# Patient Record
Sex: FEMALE | Race: OTHER | HISPANIC OR LATINO | URBAN - METROPOLITAN AREA
[De-identification: names, ages, dates, MRNs, and addresses within clinical notes are randomized per-mention and may not be internally consistent; named-entity substitution may affect disease eponyms.]

---

## 2019-09-21 ENCOUNTER — EMERGENCY (EMERGENCY)
Facility: HOSPITAL | Age: 84
LOS: 1 days | Discharge: HOME | End: 2019-09-21
Attending: EMERGENCY MEDICINE | Admitting: EMERGENCY MEDICINE
Payer: MEDICARE

## 2019-09-21 VITALS
RESPIRATION RATE: 18 BRPM | SYSTOLIC BLOOD PRESSURE: 140 MMHG | TEMPERATURE: 98 F | OXYGEN SATURATION: 95 % | HEART RATE: 90 BPM | DIASTOLIC BLOOD PRESSURE: 92 MMHG

## 2019-09-21 LAB
ANION GAP SERPL CALC-SCNC: 12 MMOL/L — SIGNIFICANT CHANGE UP (ref 5–17)
APPEARANCE UR: CLEAR — SIGNIFICANT CHANGE UP
APTT BLD: 31.8 SEC — SIGNIFICANT CHANGE UP (ref 27.5–36.3)
BASOPHILS # BLD AUTO: 0.05 K/UL — SIGNIFICANT CHANGE UP (ref 0–0.2)
BASOPHILS NFR BLD AUTO: 0.8 % — SIGNIFICANT CHANGE UP (ref 0–2)
BILIRUB UR-MCNC: NEGATIVE — SIGNIFICANT CHANGE UP
BUN SERPL-MCNC: 18 MG/DL — SIGNIFICANT CHANGE UP (ref 7–23)
CALCIUM SERPL-MCNC: 8.9 MG/DL — SIGNIFICANT CHANGE UP (ref 8.4–10.5)
CHLORIDE SERPL-SCNC: 106 MMOL/L — SIGNIFICANT CHANGE UP (ref 96–108)
CK MB CFR SERPL CALC: 4.6 NG/ML — SIGNIFICANT CHANGE UP (ref 0–6.7)
CO2 SERPL-SCNC: 22 MMOL/L — SIGNIFICANT CHANGE UP (ref 22–31)
COLOR SPEC: YELLOW — SIGNIFICANT CHANGE UP
CREAT SERPL-MCNC: 0.93 MG/DL — SIGNIFICANT CHANGE UP (ref 0.5–1.3)
D DIMER BLD IA.RAPID-MCNC: 414 NG/ML DDU — HIGH
DIFF PNL FLD: NEGATIVE — SIGNIFICANT CHANGE UP
EOSINOPHIL # BLD AUTO: 0.71 K/UL — HIGH (ref 0–0.5)
EOSINOPHIL NFR BLD AUTO: 10.7 % — HIGH (ref 0–6)
GLUCOSE SERPL-MCNC: 128 MG/DL — HIGH (ref 70–99)
GLUCOSE UR QL: NEGATIVE — SIGNIFICANT CHANGE UP
HCT VFR BLD CALC: 42.9 % — SIGNIFICANT CHANGE UP (ref 34.5–45)
HGB BLD-MCNC: 13.8 G/DL — SIGNIFICANT CHANGE UP (ref 11.5–15.5)
IMM GRANULOCYTES NFR BLD AUTO: 0.5 % — SIGNIFICANT CHANGE UP (ref 0–1.5)
INR BLD: 1.12 — SIGNIFICANT CHANGE UP (ref 0.88–1.16)
KETONES UR-MCNC: NEGATIVE — SIGNIFICANT CHANGE UP
LEUKOCYTE ESTERASE UR-ACNC: ABNORMAL
LIDOCAIN IGE QN: 8 U/L — SIGNIFICANT CHANGE UP (ref 7–60)
LYMPHOCYTES # BLD AUTO: 1.89 K/UL — SIGNIFICANT CHANGE UP (ref 1–3.3)
LYMPHOCYTES # BLD AUTO: 28.6 % — SIGNIFICANT CHANGE UP (ref 13–44)
MAGNESIUM SERPL-MCNC: 2.2 MG/DL — SIGNIFICANT CHANGE UP (ref 1.6–2.6)
MCHC RBC-ENTMCNC: 29.9 PG — SIGNIFICANT CHANGE UP (ref 27–34)
MCHC RBC-ENTMCNC: 32.2 GM/DL — SIGNIFICANT CHANGE UP (ref 32–36)
MCV RBC AUTO: 93.1 FL — SIGNIFICANT CHANGE UP (ref 80–100)
MONOCYTES # BLD AUTO: 0.69 K/UL — SIGNIFICANT CHANGE UP (ref 0–0.9)
MONOCYTES NFR BLD AUTO: 10.4 % — SIGNIFICANT CHANGE UP (ref 2–14)
NEUTROPHILS # BLD AUTO: 3.24 K/UL — SIGNIFICANT CHANGE UP (ref 1.8–7.4)
NEUTROPHILS NFR BLD AUTO: 49 % — SIGNIFICANT CHANGE UP (ref 43–77)
NITRITE UR-MCNC: POSITIVE
NRBC # BLD: 0 /100 WBCS — SIGNIFICANT CHANGE UP (ref 0–0)
PH UR: 7 — SIGNIFICANT CHANGE UP (ref 5–8)
PLATELET # BLD AUTO: 233 K/UL — SIGNIFICANT CHANGE UP (ref 150–400)
POTASSIUM SERPL-MCNC: 3.6 MMOL/L — SIGNIFICANT CHANGE UP (ref 3.5–5.3)
POTASSIUM SERPL-SCNC: 3.6 MMOL/L — SIGNIFICANT CHANGE UP (ref 3.5–5.3)
PROT UR-MCNC: NEGATIVE MG/DL — SIGNIFICANT CHANGE UP
PROTHROM AB SERPL-ACNC: 12.7 SEC — SIGNIFICANT CHANGE UP (ref 10–12.9)
RBC # BLD: 4.61 M/UL — SIGNIFICANT CHANGE UP (ref 3.8–5.2)
RBC # FLD: 13.2 % — SIGNIFICANT CHANGE UP (ref 10.3–14.5)
SODIUM SERPL-SCNC: 140 MMOL/L — SIGNIFICANT CHANGE UP (ref 135–145)
SP GR SPEC: 1.01 — SIGNIFICANT CHANGE UP (ref 1–1.03)
TROPONIN T SERPL-MCNC: 0.02 NG/ML — HIGH (ref 0–0.01)
TSH SERPL-MCNC: 5.19 UIU/ML — HIGH (ref 0.35–4.94)
UROBILINOGEN FLD QL: 0.2 E.U./DL — SIGNIFICANT CHANGE UP
WBC # BLD: 6.61 K/UL — SIGNIFICANT CHANGE UP (ref 3.8–10.5)
WBC # FLD AUTO: 6.61 K/UL — SIGNIFICANT CHANGE UP (ref 3.8–10.5)

## 2019-09-21 PROCEDURE — 71275 CT ANGIOGRAPHY CHEST: CPT

## 2019-09-21 PROCEDURE — 36415 COLL VENOUS BLD VENIPUNCTURE: CPT

## 2019-09-21 PROCEDURE — 85025 COMPLETE CBC W/AUTO DIFF WBC: CPT

## 2019-09-21 PROCEDURE — 99284 EMERGENCY DEPT VISIT MOD MDM: CPT | Mod: 25

## 2019-09-21 PROCEDURE — 81001 URINALYSIS AUTO W/SCOPE: CPT

## 2019-09-21 PROCEDURE — 83880 ASSAY OF NATRIURETIC PEPTIDE: CPT

## 2019-09-21 PROCEDURE — 71045 X-RAY EXAM CHEST 1 VIEW: CPT

## 2019-09-21 PROCEDURE — 80162 ASSAY OF DIGOXIN TOTAL: CPT

## 2019-09-21 PROCEDURE — 99284 EMERGENCY DEPT VISIT MOD MDM: CPT

## 2019-09-21 PROCEDURE — 82550 ASSAY OF CK (CPK): CPT

## 2019-09-21 PROCEDURE — 85730 THROMBOPLASTIN TIME PARTIAL: CPT

## 2019-09-21 PROCEDURE — 87186 SC STD MICRODIL/AGAR DIL: CPT

## 2019-09-21 PROCEDURE — 84484 ASSAY OF TROPONIN QUANT: CPT

## 2019-09-21 PROCEDURE — 80048 BASIC METABOLIC PNL TOTAL CA: CPT

## 2019-09-21 PROCEDURE — 84443 ASSAY THYROID STIM HORMONE: CPT

## 2019-09-21 PROCEDURE — 71275 CT ANGIOGRAPHY CHEST: CPT | Mod: 26

## 2019-09-21 PROCEDURE — 87086 URINE CULTURE/COLONY COUNT: CPT

## 2019-09-21 PROCEDURE — 85610 PROTHROMBIN TIME: CPT

## 2019-09-21 PROCEDURE — 71045 X-RAY EXAM CHEST 1 VIEW: CPT | Mod: 26

## 2019-09-21 PROCEDURE — 85379 FIBRIN DEGRADATION QUANT: CPT

## 2019-09-21 PROCEDURE — 82553 CREATINE MB FRACTION: CPT

## 2019-09-21 PROCEDURE — 93005 ELECTROCARDIOGRAM TRACING: CPT

## 2019-09-21 PROCEDURE — 83690 ASSAY OF LIPASE: CPT

## 2019-09-21 PROCEDURE — 83735 ASSAY OF MAGNESIUM: CPT

## 2019-09-21 NOTE — ED PROVIDER NOTE - OBJECTIVE STATEMENT
phx/Rx obtained from niece subsequent to event. remaining obtained from ems/pt.    86F mild dementia, "leaky valves" on digoxin (no lasix), htn, hld, [facility] where [facility] c/o acute sob/generalized weakness. upon arrival ems reports +wheezing and so gave duoneb w/ improved sx but then incidentally found to have hr transiently 200s before dropping to 110-120s on rhythm strip/12 lead, sbp 120s, 99% RA on nrb for comfort. initally called in as "stemi." upon arrival to ED, pt denies any sx. no fever/chills, no uri/cough, no cp, no palpitations, no ha/dizziness, no abd pain/n/v, no diarrhea, no leg pain/swelling/rash, no phx dvt/pe, no phx malignancy, no recent travel. at baseline, a+ox3, most ADLs, ambulates w/ walker.    per niece, Rx: lyrica, crestor, enalapril, digoxin, corlandor, ivabradire, carvedilol, ventolin    pcp: carole oro

## 2019-09-21 NOTE — ED PROVIDER NOTE - NSFOLLOWUPINSTRUCTIONS_ED_ALL_ED_FT
please follow up with your cardiologist    please come back to the er for any worsening of symptoms    Taquicardia supraventricular en los adultos  Supraventricular Tachycardia, Adult  Introducción  La taquicardia supraventricular (TSV) es yamileth alteración en los latidos cardíacos. Hace que el corazón deejay muy rápido y que luego deejay con yamileth frecuencia normal.    Yamileth frecuencia cardíaca normal es entre 60 y 100 latidos por minuto. Esta afección puede hacer que el corazón deejay con yamileth frecuencia mayor a 150 veces por minuto. Los episodios de latidos cardíacos acelerados pueden ser aterradores, priyank generalmente no son peligrosos. Pueden provocar complicaciones si:  Ocurren frecuentemente.  Martinez mucho tiempo.  Los síntomas de esta afección incluyen los siguientes:  Palpitaciones.  Sensación de que el corazón se saltea latidos (palpitaciones).  Debilidad.  Problema para recibir suficiente aire (falta de aire).  Dolor u opresión en el pecho.  Se siente jasbir si se fuera a desmayar (sensación de desvanecimiento).  Estar preocupado o nervioso (ansioso).  Mareos.  Sudoración.  Ganas de vomitar (náuseas).  Desmayos.  Cansancio.  En algunos casos, no hay síntomas.    Siga estas indicaciones en grace casa:  Estrés     Image   Evite las cosas que lo hacen sentir estresado.  [place] qué lo ayuda a sentirse menos estresado. Intente lo siguiente:  Realizar yamileth actividad relajante, jasbir yoga o la meditación, o estar en contacto con la naturaleza.  Escuchar música relajante.  Practicar técnicas de relajación, jasbir la respiración profunda.  [place] medidas para mantenerse saludable. [place] implica dormir mucho, hacer ejercicio y seguir yamileth dieta equilibrada.  Consultar a un médico de alfonso mental.  Cadiz     Trate de dormir jasbir mínimo 7 horas todas las noches.  Tabaco y nicotina     Image   No consuma nada que contenga nicotina o tabaco, jasbir cigarrillos y cigarrillos electrónicos. Si necesita ayuda para dejar de fumar, consulte al médico.  Alcohol     Si el alcohol le provoca latidos cardíacos acelerados, no consuma alcohol.  Si el alcohol no parece afectar emily latidos cardíacos, limite grace consumo. En el adama de las mujeres que no están embarazadas, esto significa no beber más de 1 medida por día. En el adama de los hombres, esto significa no beber más de 2 medidas por día. "Yamileth medida" significa:  12 onzas (355 ml) de cerveza.  5 onzas de vino (150 ml) de vino.  1½ onzas (45 ml) de bebidas alcohólicas de millicent graduación.  Cafeína     Si la cafeína le provoca latidos cardíacos acelerados, no coma, marta ni consuma nada que contenga cafeína.  Si la cafeína no parece afectar emily latidos cardíacos, limite la cantidad de cafeína que come, joya o consume.  Estimulantes     No consuma estimulantes. Estas son drogas jasbir la cocaína o la metanfetamina. Si necesita ayuda para dejar de drogarse, consulte al médico.  Instrucciones generales     Mantenga un peso saludable.  [place] ejercicios regularmente. Pídale al médico que le sugiera algunas actividades adecuadas para usted. Pruebe yamileth de las siguientes opciones:  150 minutos semanales de ejercicio leve, jasbir caminar o practicar yoga.  75 minutos semanales de ejercicio de millicent intensidad, jasbir correr o nadar.  Yamileth combinación de ejercicios leves y de millicent intensidad.  [place] tratamientos en el hogar para desacelerar los latidos cardíacos jasbir se lo haya indicado el médico.  [place] los medicamentos de venta alpesh y los recetados solamente jasbir se lo haya indicado el médico.  Comuníquese con un médico si:  Tiene latidos cardíacos acelerados con más frecuencia.  Los episodios de latidos cardíacos acelerados martinez más que antes.  Los tratamientos que hace en el hogar para desacelerar los latidos cardíacos no son útiles.  Aparecen nuevos síntomas.  Solicite ayuda de inmediato si:  Siente dolor en el pecho.  Los síntomas empeoran.  Tiene dificultad para respirar.  El corazón le late muy rápido enrike más de 20 minutos.  Pierde el conocimiento (se desmaya).  Estos síntomas pueden indicar yamileth emergencia. No espere hasta que los síntomas desaparezcan. Solicite atención médica de inmediato. Comuníquese con el servicio de emergencias de grace localidad (911 en los [place]). No conduzca por emily propios medios hasta el hospital.     Esta información no tiene jasbir fin reemplazar el consejo del médico. Asegúrese de hacerle al médico cualquier pregunta que tenga.    Document Released: 12/18/2006 Document Revised: 07/23/2018 Document Reviewed: 06/15/2017  Elsevier Interactive Patient Education © 2019 Elsevier Inc.

## 2019-09-21 NOTE — ED PROVIDER NOTE - CARE PLAN
Principal Discharge DX:	SVT (supraventricular tachycardia)  Secondary Diagnosis:	Shortness of breath

## 2019-09-21 NOTE — ED ADULT TRIAGE NOTE - OTHER COMPLAINTS
SOB today. Pt was placed on cardiac monitor and noted vtach. Pt denies active cp, lightheadedness, no active sob. Upgraded to resus. EKG in progress

## 2019-09-21 NOTE — ED ADULT NURSE REASSESSMENT NOTE - NS ED NURSE REASSESS COMMENT FT1
Rec'd pt calm, in no distress, breathing with ease on room air. Pt denies CP/discomfort. States she is feeling much better. Pt on Cardiac monitor, VSS. Pt needs met. Pt made aware of plan of care and in agreement. HL 18G RAC noted, patent. Awaiting further eval and dispo. Safety precautions in place. Close monitoring continues.

## 2019-09-21 NOTE — ED PROVIDER NOTE - PATIENT PORTAL LINK FT
You can access the FollowMyHealth Patient Portal offered by [facility] by registering at the following website: [email or web address]. By joining [facility]’s FollowMyHealth portal, you will also be able to view your health information using other applications (apps) compatible with our system.

## 2019-09-21 NOTE — ED PROVIDER NOTE - CLINICAL SUMMARY MEDICAL DECISION MAKING FREE TEXT BOX
dispo: pending cta chest, admission to [facility] under mati for acs r/o avss. nontoxic. NAD. no active cp. no acute resp distress. pt reports resolved sx. called in as a stemi code however found to be likely in svt w/ aberrancy that resolved spontaneously upon repeat ekg. remained hds w/ intact mentation. cardiology consulted. trop 0.02 w/o prior, bnp elevated, ddimer elevated. will get cta chest and admit for r/o acs. no leukocytosis vs sig anemia vs electrolyte abnl. cxr w/o acute focal consol vs ptx vs pulm edema.  dispo: pending cta chest, admission to [facility] under [facility] for acs r/o

## 2019-09-21 NOTE — ED PROVIDER NOTE - PROGRESS NOTE DETAILS
cards called upon arrival given 12 lead w/ c/f stable vtach hr 120s. upon arrival pt spontanouesly broke w/ LBBB likely svt w/ aberrancy. okay to admit to cards tele under mati. cards called upon arrival given 12 lead w/ c/f stable vtach hr 120s. upon arrival pt spontanouesly broke to hr 80s w/ LBBB likely svt w/ aberrancy. okay to admit to cards tele under mati. ct reviewed with pt and HCP. ct discussed with Dr. Cardona and patient and family. family and patient would like to be dc home AMA. discussed risks and concerns given concer for svt. pt states "I am old and it is in the hands of god". Patient requests to leave emergency department against medical advice, prior to completion of my desired evaluation and treatment plan.  Patient's mental status is normal, patient is fully alert and oriented to person, place and time.  Patient demonstrates clear reasoning capabilities and capacity to make this decision.   I have spoken to the patient regarding risks of leaving without further testing and/or treatment. Patient fully understands the explained risks involved with this decision including worsening of medical condition, missed and or delayed diagnosis, permanent disability and death.  All alternative options given to patient who still desires discharge against medical advice from ED and will follow up as an outpatient.  Patient is encouraged to return to ED at any time to have further evaluation and treatment should and/or if symptoms worsen. ED evaluation and management discussed with the patient and family (if available) in detail.  Close PMD follow up encouraged.  Strict ED return instructions discussed in detail and patient given the opportunity to ask any questions about their discharge diagnosis and instructions. Patient verbalized understanding. Patient is agreeable to plan.

## 2019-09-21 NOTE — ED ADULT NURSE NOTE - NSIMPLEMENTINTERV_GEN_ALL_ED
Implemented All Universal Safety Interventions:  [place] to call system. Call bell, personal items and telephone within reach. Instruct patient to call for assistance. Room bathroom lighting operational. Non-slip footwear when patient is off stretcher. Physically safe environment: no spills, clutter or unnecessary equipment. Stretcher in lowest position, wheels locked, appropriate side rails in place.

## 2019-09-21 NOTE — ED PROVIDER NOTE - PHYSICAL EXAMINATION
CONST: nontoxic NAD speaking in full sentences  HEAD: atraumatic  EYES: conjunctivae clear  ENT: mmm  NECK: supple, no jvd  CARD: rrr no murmurs  CHEST: ctab no r/r/w, no stridor/retractions/tripoding  ABD: soft, nd, nttp, no rebound/guarding  EXT: FROM, symmetric distal pulses intact  SKIN: warm, dry, no rash, no pedal edema/pain/rash, cap refill <2sec  NEURO: a+ox3, 5/5 strength x4, gross sensation intact x4, normal gait

## 2019-09-22 VITALS
SYSTOLIC BLOOD PRESSURE: 152 MMHG | HEART RATE: 72 BPM | DIASTOLIC BLOOD PRESSURE: 64 MMHG | RESPIRATION RATE: 18 BRPM | TEMPERATURE: 98 F | OXYGEN SATURATION: 96 %

## 2019-09-23 LAB
-  AMPICILLIN/SULBACTAM: SIGNIFICANT CHANGE UP
-  AMPICILLIN: SIGNIFICANT CHANGE UP
-  CEFAZOLIN: SIGNIFICANT CHANGE UP
-  CEFTRIAXONE: SIGNIFICANT CHANGE UP
-  CIPROFLOXACIN: SIGNIFICANT CHANGE UP
-  GENTAMICIN: SIGNIFICANT CHANGE UP
-  NITROFURANTOIN: SIGNIFICANT CHANGE UP
-  PIPERACILLIN/TAZOBACTAM: SIGNIFICANT CHANGE UP
-  TOBRAMYCIN: SIGNIFICANT CHANGE UP
-  TRIMETHOPRIM/SULFAMETHOXAZOLE: SIGNIFICANT CHANGE UP
CULTURE RESULTS: SIGNIFICANT CHANGE UP
METHOD TYPE: SIGNIFICANT CHANGE UP
ORGANISM # SPEC MICROSCOPIC CNT: SIGNIFICANT CHANGE UP
ORGANISM # SPEC MICROSCOPIC CNT: SIGNIFICANT CHANGE UP
SPECIMEN SOURCE: SIGNIFICANT CHANGE UP

## 2019-09-24 RX ORDER — CEPHALEXIN 500 MG
1 CAPSULE ORAL
Qty: 10 | Refills: 0
Start: 2019-09-24 | End: 2019-09-28

## 2019-09-25 DIAGNOSIS — R06.02 SHORTNESS OF BREATH: ICD-10-CM

## 2019-09-25 DIAGNOSIS — I47.1 SUPRAVENTRICULAR TACHYCARDIA: ICD-10-CM

## 2020-01-01 ENCOUNTER — RX RENEWAL (OUTPATIENT)
Age: 85
End: 2020-01-01

## 2020-01-01 ENCOUNTER — APPOINTMENT (OUTPATIENT)
Dept: HOME HEALTH SERVICES | Facility: HOME HEALTH | Age: 85
End: 2020-01-01

## 2020-01-01 ENCOUNTER — APPOINTMENT (OUTPATIENT)
Dept: HOME HEALTH SERVICES | Facility: HOME HEALTH | Age: 85
End: 2020-01-01
Payer: MEDICARE

## 2020-01-01 VITALS
HEART RATE: 76 BPM | DIASTOLIC BLOOD PRESSURE: 80 MMHG | SYSTOLIC BLOOD PRESSURE: 120 MMHG | RESPIRATION RATE: 18 BRPM | TEMPERATURE: 98 F | OXYGEN SATURATION: 96 % | HEIGHT: 60 IN

## 2020-01-01 VITALS
DIASTOLIC BLOOD PRESSURE: 70 MMHG | TEMPERATURE: 97 F | OXYGEN SATURATION: 94 % | RESPIRATION RATE: 18 BRPM | HEIGHT: 60 IN | HEART RATE: 72 BPM | SYSTOLIC BLOOD PRESSURE: 126 MMHG

## 2020-01-01 DIAGNOSIS — Z23 ENCOUNTER FOR IMMUNIZATION: ICD-10-CM

## 2020-01-01 DIAGNOSIS — R78.89 FINDING OF OTHER SPECIFIED SUBSTANCES, NOT NORMALLY FOUND IN BLOOD: ICD-10-CM

## 2020-01-01 LAB
ALBUMIN SERPL ELPH-MCNC: 3.9 G/DL
ALP BLD-CCNC: 105 U/L
ALT SERPL-CCNC: 15 U/L
ANION GAP SERPL CALC-SCNC: 14 MMOL/L
AST SERPL-CCNC: 28 U/L
BASOPHILS # BLD AUTO: 0.04 K/UL
BASOPHILS NFR BLD AUTO: 0.7 %
BILIRUB SERPL-MCNC: 0.5 MG/DL
BUN SERPL-MCNC: 36 MG/DL
CALCIUM SERPL-MCNC: 9.2 MG/DL
CHLORIDE SERPL-SCNC: 104 MMOL/L
CHOLEST SERPL-MCNC: 114 MG/DL
CHOLEST/HDLC SERPL: 2.4 RATIO
CO2 SERPL-SCNC: 23 MMOL/L
CREAT SERPL-MCNC: 1.17 MG/DL
DIGOXIN SERPL-MCNC: 0.6 NG/ML
EOSINOPHIL # BLD AUTO: 0.62 K/UL
EOSINOPHIL NFR BLD AUTO: 10.6 %
GLUCOSE SERPL-MCNC: 123 MG/DL
HCT VFR BLD CALC: 34.4 %
HDLC SERPL-MCNC: 47 MG/DL
HGB BLD-MCNC: 10.8 G/DL
IMM GRANULOCYTES NFR BLD AUTO: 0.2 %
LDLC SERPL CALC-MCNC: 52 MG/DL
LYMPHOCYTES # BLD AUTO: 1.33 K/UL
LYMPHOCYTES NFR BLD AUTO: 22.8 %
MAN DIFF?: NORMAL
MCHC RBC-ENTMCNC: 29.5 PG
MCHC RBC-ENTMCNC: 31.4 GM/DL
MCV RBC AUTO: 94 FL
MONOCYTES # BLD AUTO: 0.54 K/UL
MONOCYTES NFR BLD AUTO: 9.2 %
NEUTROPHILS # BLD AUTO: 3.3 K/UL
NEUTROPHILS NFR BLD AUTO: 56.5 %
PLATELET # BLD AUTO: 211 K/UL
POTASSIUM SERPL-SCNC: 3.8 MMOL/L
PROT SERPL-MCNC: 7 G/DL
RBC # BLD: 3.66 M/UL
RBC # FLD: 13.8 %
SODIUM SERPL-SCNC: 142 MMOL/L
TRIGL SERPL-MCNC: 76 MG/DL
WBC # FLD AUTO: 5.84 K/UL

## 2020-01-01 PROCEDURE — G0008: CPT

## 2020-01-01 PROCEDURE — 99349 HOME/RES VST EST MOD MDM 40: CPT | Mod: 25

## 2020-01-01 PROCEDURE — 90662 IIV NO PRSV INCREASED AG IM: CPT

## 2020-01-01 PROCEDURE — 99349 HOME/RES VST EST MOD MDM 40: CPT

## 2020-01-01 RX ORDER — FUROSEMIDE 40 MG/1
40 TABLET ORAL DAILY
Qty: 90 | Refills: 3 | Status: DISCONTINUED | COMMUNITY
Start: 2020-03-05 | End: 2020-01-01

## 2020-01-01 RX ORDER — ALBUTEROL SULFATE 90 UG/1
108 (90 BASE) AEROSOL, METERED RESPIRATORY (INHALATION) EVERY 4 HOURS
Qty: 1 | Refills: 5 | Status: DISCONTINUED | COMMUNITY
Start: 2020-01-01 | End: 2020-01-01

## 2020-01-13 ENCOUNTER — INPATIENT (INPATIENT)
Facility: HOSPITAL | Age: 85
LOS: 13 days | Discharge: NURSING, REHAB OR LONG-TERM CARE | DRG: 871 | End: 2020-01-27
Attending: INTERNAL MEDICINE | Admitting: INTERNAL MEDICINE
Payer: MEDICARE

## 2020-01-13 VITALS
HEART RATE: 90 BPM | RESPIRATION RATE: 22 BRPM | DIASTOLIC BLOOD PRESSURE: 90 MMHG | OXYGEN SATURATION: 98 % | WEIGHT: 149.91 LBS | SYSTOLIC BLOOD PRESSURE: 180 MMHG

## 2020-01-13 DIAGNOSIS — F03.90 UNSPECIFIED DEMENTIA WITHOUT BEHAVIORAL DISTURBANCE: ICD-10-CM

## 2020-01-13 DIAGNOSIS — E78.5 HYPERLIPIDEMIA, UNSPECIFIED: ICD-10-CM

## 2020-01-13 DIAGNOSIS — A41.9 SEPSIS, UNSPECIFIED ORGANISM: ICD-10-CM

## 2020-01-13 DIAGNOSIS — N17.9 ACUTE KIDNEY FAILURE, UNSPECIFIED: ICD-10-CM

## 2020-01-13 DIAGNOSIS — Z91.89 OTHER SPECIFIED PERSONAL RISK FACTORS, NOT ELSEWHERE CLASSIFIED: ICD-10-CM

## 2020-01-13 DIAGNOSIS — Z29.9 ENCOUNTER FOR PROPHYLACTIC MEASURES, UNSPECIFIED: ICD-10-CM

## 2020-01-13 DIAGNOSIS — I50.9 HEART FAILURE, UNSPECIFIED: ICD-10-CM

## 2020-01-13 DIAGNOSIS — I10 ESSENTIAL (PRIMARY) HYPERTENSION: ICD-10-CM

## 2020-01-13 DIAGNOSIS — R74.0 NONSPECIFIC ELEVATION OF LEVELS OF TRANSAMINASE AND LACTIC ACID DEHYDROGENASE [LDH]: ICD-10-CM

## 2020-01-13 DIAGNOSIS — R79.89 OTHER SPECIFIED ABNORMAL FINDINGS OF BLOOD CHEMISTRY: ICD-10-CM

## 2020-01-13 LAB
ALBUMIN SERPL ELPH-MCNC: 4.1 G/DL — SIGNIFICANT CHANGE UP (ref 3.3–5)
ALP SERPL-CCNC: 155 U/L — HIGH (ref 40–120)
ALT FLD-CCNC: 28 U/L — SIGNIFICANT CHANGE UP (ref 10–45)
ANION GAP SERPL CALC-SCNC: 16 MMOL/L — SIGNIFICANT CHANGE UP (ref 5–17)
APPEARANCE UR: ABNORMAL
APTT BLD: 37.7 SEC — HIGH (ref 27.5–36.3)
AST SERPL-CCNC: 116 U/L — HIGH (ref 10–40)
BASOPHILS # BLD AUTO: 0.04 K/UL — SIGNIFICANT CHANGE UP (ref 0–0.2)
BASOPHILS NFR BLD AUTO: 0.2 % — SIGNIFICANT CHANGE UP (ref 0–2)
BILIRUB SERPL-MCNC: 0.4 MG/DL — SIGNIFICANT CHANGE UP (ref 0.2–1.2)
BILIRUB UR-MCNC: NEGATIVE — SIGNIFICANT CHANGE UP
BUN SERPL-MCNC: 22 MG/DL — SIGNIFICANT CHANGE UP (ref 7–23)
CALCIUM SERPL-MCNC: 8.7 MG/DL — SIGNIFICANT CHANGE UP (ref 8.4–10.5)
CHLORIDE SERPL-SCNC: 101 MMOL/L — SIGNIFICANT CHANGE UP (ref 96–108)
CO2 SERPL-SCNC: 19 MMOL/L — LOW (ref 22–31)
COLOR SPEC: YELLOW — SIGNIFICANT CHANGE UP
CREAT SERPL-MCNC: 1.62 MG/DL — HIGH (ref 0.5–1.3)
DIFF PNL FLD: ABNORMAL
EOSINOPHIL # BLD AUTO: 0 K/UL — SIGNIFICANT CHANGE UP (ref 0–0.5)
EOSINOPHIL NFR BLD AUTO: 0 % — SIGNIFICANT CHANGE UP (ref 0–6)
FLUAV H1 2009 PAND RNA SPEC QL NAA+PROBE: DETECTED
GAS PNL BLDA: SIGNIFICANT CHANGE UP
GAS PNL BLDV: SIGNIFICANT CHANGE UP
GLUCOSE SERPL-MCNC: 112 MG/DL — HIGH (ref 70–99)
GLUCOSE UR QL: NEGATIVE — SIGNIFICANT CHANGE UP
HCT VFR BLD CALC: 45.5 % — HIGH (ref 34.5–45)
HGB BLD-MCNC: 14 G/DL — SIGNIFICANT CHANGE UP (ref 11.5–15.5)
IMM GRANULOCYTES NFR BLD AUTO: 0.8 % — SIGNIFICANT CHANGE UP (ref 0–1.5)
INR BLD: 1.2 — HIGH (ref 0.88–1.16)
KETONES UR-MCNC: NEGATIVE — SIGNIFICANT CHANGE UP
LACTATE SERPL-SCNC: 2 MMOL/L — SIGNIFICANT CHANGE UP (ref 0.5–2)
LEUKOCYTE ESTERASE UR-ACNC: NEGATIVE — SIGNIFICANT CHANGE UP
LYMPHOCYTES # BLD AUTO: 1.28 K/UL — SIGNIFICANT CHANGE UP (ref 1–3.3)
LYMPHOCYTES # BLD AUTO: 6.1 % — LOW (ref 13–44)
MCHC RBC-ENTMCNC: 29.8 PG — SIGNIFICANT CHANGE UP (ref 27–34)
MCHC RBC-ENTMCNC: 30.8 GM/DL — LOW (ref 32–36)
MCV RBC AUTO: 96.8 FL — SIGNIFICANT CHANGE UP (ref 80–100)
MONOCYTES # BLD AUTO: 1.35 K/UL — HIGH (ref 0–0.9)
MONOCYTES NFR BLD AUTO: 6.4 % — SIGNIFICANT CHANGE UP (ref 2–14)
NEUTROPHILS # BLD AUTO: 18.19 K/UL — HIGH (ref 1.8–7.4)
NEUTROPHILS NFR BLD AUTO: 86.5 % — HIGH (ref 43–77)
NITRITE UR-MCNC: NEGATIVE — SIGNIFICANT CHANGE UP
NRBC # BLD: 0 /100 WBCS — SIGNIFICANT CHANGE UP (ref 0–0)
NT-PROBNP SERPL-SCNC: HIGH PG/ML (ref 0–300)
PH UR: 6 — SIGNIFICANT CHANGE UP (ref 5–8)
PLATELET # BLD AUTO: 198 K/UL — SIGNIFICANT CHANGE UP (ref 150–400)
POTASSIUM SERPL-MCNC: 4.7 MMOL/L — SIGNIFICANT CHANGE UP (ref 3.5–5.3)
POTASSIUM SERPL-SCNC: 4.7 MMOL/L — SIGNIFICANT CHANGE UP (ref 3.5–5.3)
PROT SERPL-MCNC: 7.6 G/DL — SIGNIFICANT CHANGE UP (ref 6–8.3)
PROT UR-MCNC: 100 MG/DL
PROTHROM AB SERPL-ACNC: 13.8 SEC — HIGH (ref 10–12.9)
RAPID RVP RESULT: DETECTED
RBC # BLD: 4.7 M/UL — SIGNIFICANT CHANGE UP (ref 3.8–5.2)
RBC # FLD: 13.2 % — SIGNIFICANT CHANGE UP (ref 10.3–14.5)
SODIUM SERPL-SCNC: 136 MMOL/L — SIGNIFICANT CHANGE UP (ref 135–145)
SP GR SPEC: >=1.03 — SIGNIFICANT CHANGE UP (ref 1–1.03)
TROPONIN T SERPL-MCNC: 0.06 NG/ML — CRITICAL HIGH (ref 0–0.01)
TROPONIN T SERPL-MCNC: 0.07 NG/ML — CRITICAL HIGH (ref 0–0.01)
UROBILINOGEN FLD QL: 0.2 E.U./DL — SIGNIFICANT CHANGE UP
WBC # BLD: 21.03 K/UL — HIGH (ref 3.8–10.5)
WBC # FLD AUTO: 21.03 K/UL — HIGH (ref 3.8–10.5)

## 2020-01-13 PROCEDURE — 99291 CRITICAL CARE FIRST HOUR: CPT

## 2020-01-13 PROCEDURE — 93010 ELECTROCARDIOGRAM REPORT: CPT

## 2020-01-13 PROCEDURE — 99223 1ST HOSP IP/OBS HIGH 75: CPT | Mod: AI

## 2020-01-13 PROCEDURE — 71045 X-RAY EXAM CHEST 1 VIEW: CPT | Mod: 26

## 2020-01-13 RX ORDER — ACETAMINOPHEN 500 MG
650 TABLET ORAL ONCE
Refills: 0 | Status: COMPLETED | OUTPATIENT
Start: 2020-01-13 | End: 2020-01-13

## 2020-01-13 RX ORDER — GABAPENTIN 400 MG/1
100 CAPSULE ORAL EVERY 12 HOURS
Refills: 0 | Status: DISCONTINUED | OUTPATIENT
Start: 2020-01-13 | End: 2020-01-27

## 2020-01-13 RX ORDER — ENOXAPARIN SODIUM 100 MG/ML
30 INJECTION SUBCUTANEOUS EVERY 24 HOURS
Refills: 0 | Status: DISCONTINUED | OUTPATIENT
Start: 2020-01-13 | End: 2020-01-22

## 2020-01-13 RX ORDER — ATORVASTATIN CALCIUM 80 MG/1
40 TABLET, FILM COATED ORAL DAILY
Refills: 0 | Status: DISCONTINUED | OUTPATIENT
Start: 2020-01-13 | End: 2020-01-13

## 2020-01-13 RX ORDER — CARVEDILOL PHOSPHATE 80 MG/1
12.5 CAPSULE, EXTENDED RELEASE ORAL EVERY 12 HOURS
Refills: 0 | Status: DISCONTINUED | OUTPATIENT
Start: 2020-01-13 | End: 2020-01-13

## 2020-01-13 RX ORDER — ATORVASTATIN CALCIUM 80 MG/1
40 TABLET, FILM COATED ORAL DAILY
Refills: 0 | Status: DISCONTINUED | OUTPATIENT
Start: 2020-01-13 | End: 2020-01-27

## 2020-01-13 RX ORDER — IPRATROPIUM/ALBUTEROL SULFATE 18-103MCG
3 AEROSOL WITH ADAPTER (GRAM) INHALATION ONCE
Refills: 0 | Status: COMPLETED | OUTPATIENT
Start: 2020-01-13 | End: 2020-01-13

## 2020-01-13 RX ORDER — FUROSEMIDE 40 MG
20 TABLET ORAL ONCE
Refills: 0 | Status: COMPLETED | OUTPATIENT
Start: 2020-01-13 | End: 2020-01-13

## 2020-01-13 RX ORDER — PIPERACILLIN AND TAZOBACTAM 4; .5 G/20ML; G/20ML
3.38 INJECTION, POWDER, LYOPHILIZED, FOR SOLUTION INTRAVENOUS ONCE
Refills: 0 | Status: COMPLETED | OUTPATIENT
Start: 2020-01-13 | End: 2020-01-13

## 2020-01-13 RX ORDER — VANCOMYCIN HCL 1 G
1000 VIAL (EA) INTRAVENOUS ONCE
Refills: 0 | Status: COMPLETED | OUTPATIENT
Start: 2020-01-13 | End: 2020-01-13

## 2020-01-13 RX ORDER — VENLAFAXINE HCL 75 MG
75 CAPSULE, EXT RELEASE 24 HR ORAL EVERY 12 HOURS
Refills: 0 | Status: DISCONTINUED | OUTPATIENT
Start: 2020-01-13 | End: 2020-01-27

## 2020-01-13 RX ORDER — IVABRADINE 7.5 MG/1
5 TABLET, FILM COATED ORAL
Refills: 0 | Status: DISCONTINUED | OUTPATIENT
Start: 2020-01-13 | End: 2020-01-27

## 2020-01-13 RX ORDER — LEVOTHYROXINE SODIUM 125 MCG
100 TABLET ORAL DAILY
Refills: 0 | Status: DISCONTINUED | OUTPATIENT
Start: 2020-01-13 | End: 2020-01-27

## 2020-01-13 RX ORDER — INFLUENZA VIRUS VACCINE 15; 15; 15; 15 UG/.5ML; UG/.5ML; UG/.5ML; UG/.5ML
0.5 SUSPENSION INTRAMUSCULAR ONCE
Refills: 0 | Status: DISCONTINUED | OUTPATIENT
Start: 2020-01-13 | End: 2020-01-27

## 2020-01-13 RX ORDER — DIGOXIN 250 MCG
0.12 TABLET ORAL EVERY OTHER DAY
Refills: 0 | Status: DISCONTINUED | OUTPATIENT
Start: 2020-01-13 | End: 2020-01-27

## 2020-01-13 RX ADMIN — Medication 3 MILLILITER(S): at 14:07

## 2020-01-13 RX ADMIN — Medication 3 MILLILITER(S): at 13:49

## 2020-01-13 RX ADMIN — Medication 30 MILLIGRAM(S): at 18:26

## 2020-01-13 RX ADMIN — Medication 1000 MILLIGRAM(S): at 14:13

## 2020-01-13 RX ADMIN — PIPERACILLIN AND TAZOBACTAM 3.38 GRAM(S): 4; .5 INJECTION, POWDER, LYOPHILIZED, FOR SOLUTION INTRAVENOUS at 14:07

## 2020-01-13 RX ADMIN — Medication 650 MILLIGRAM(S): at 13:24

## 2020-01-13 RX ADMIN — Medication 0.12 MILLIGRAM(S): at 22:47

## 2020-01-13 RX ADMIN — PIPERACILLIN AND TAZOBACTAM 200 GRAM(S): 4; .5 INJECTION, POWDER, LYOPHILIZED, FOR SOLUTION INTRAVENOUS at 13:23

## 2020-01-13 RX ADMIN — Medication 250 MILLIGRAM(S): at 14:06

## 2020-01-13 RX ADMIN — ATORVASTATIN CALCIUM 40 MILLIGRAM(S): 80 TABLET, FILM COATED ORAL at 22:47

## 2020-01-13 RX ADMIN — Medication 650 MILLIGRAM(S): at 14:14

## 2020-01-13 RX ADMIN — ENOXAPARIN SODIUM 30 MILLIGRAM(S): 100 INJECTION SUBCUTANEOUS at 18:26

## 2020-01-13 RX ADMIN — Medication 20 MILLIGRAM(S): at 17:32

## 2020-01-13 RX ADMIN — Medication 125 MILLIGRAM(S): at 13:24

## 2020-01-13 RX ADMIN — Medication 3 MILLILITER(S): at 13:24

## 2020-01-13 NOTE — ED ADULT NURSE NOTE - OBJECTIVE STATEMENT
Patient w/ extensive medical Hx to include CHF, Asthma, HTN, brought in by HCP due to lethargy and SOB noted this morning, O2 sat 80% on RA,  99% on NRB on arrival to ED.  Patient at baseline forgetful, ambulates w/ walker.  Immediate Upgrade due to SOB and Sepsis protocol , rectal temperature 102o.  EKG sinus tacchycardia w/ occasional PVCs.

## 2020-01-13 NOTE — H&P ADULT - NSHPLABSRESULTS_GEN_ALL_CORE
LABS:                         14.0   21.03 )-----------( 198      ( 13 Jan 2020 13:27 )             45.5     01-13    136  |  101  |  22  ----------------------------<  112<H>  4.7   |  19<L>  |  1.62<H>    Ca    8.7      13 Jan 2020 13:27    TPro  7.6  /  Alb  4.1  /  TBili  0.4  /  DBili  x   /  AST  116<H>  /  ALT  28  /  AlkPhos  155<H>  01-13    PT/INR - ( 13 Jan 2020 13:27 )   PT: 13.8 sec;   INR: 1.20          PTT - ( 13 Jan 2020 13:27 )  PTT:37.7 sec  Urinalysis Basic - ( 13 Jan 2020 13:56 )    Color: Yellow / Appearance: Hazy / SG: >=1.030 / pH: x  Gluc: x / Ketone: NEGATIVE  / Bili: Negative / Urobili: 0.2 E.U./dL   Blood: x / Protein: 100 mg/dL / Nitrite: NEGATIVE   Leuk Esterase: NEGATIVE / RBC: < 5 /HPF / WBC < 5 /HPF   Sq Epi: x / Non Sq Epi: Many /HPF / Bacteria: Many /HPF      CARDIAC MARKERS ( 13 Jan 2020 13:27 )  x     / 0.07 ng/mL / x     / x     / x          Serum Pro-Brain Natriuretic Peptide: 67259 pg/mL (01-13 @ 13:27)    Lactate, Blood: 2.0 mmol/L (01-13 @ 13:23)      RADIOLOGY, EKG & ADDITIONAL TESTS:

## 2020-01-13 NOTE — H&P ADULT - NSHPSOCIALHISTORY_GEN_ALL_CORE
Smoked 1PPD for 10 years, quit  20 years ago. Denies alcohol or drug use. Lives at home with friend. Ambulates using a walker.

## 2020-01-13 NOTE — H&P ADULT - HISTORY OF PRESENT ILLNESS
88F PMH HTN, HLD, and "leaky valves" presents with shortness of breath for one day. Most of the history is provided by patient's niece Pebbles Kan (HCP) at bedside. The niece reports that the patient was in [place] for 3 weeks and got back 4 days ago. During the time the patient was away, she hasn't been taking her medications compliantly because of her forgetfullness. She resumed her medications yesterday. Yesterday morning the patient started feeling short of breath and progressively got worse over time. Her niece noticed that by the end of the day she was gasping for air and decided to bring her into the emergency room. The niece says that 4 days ago when she picked her up from the airport, the patient forgot her coat on the plane and was walking outside to he car without her coat denies any sick contact but notes that 4 days ago when the    She lives with a friend at home and ambulates using a walker.   PMH mild dementia, "leaky valves,"  HTN, HLD, p/w SOB.  	Pt unable to provide any hx 2/2 illness. Provided by mitul Kan (HCP) at bedside. Pt lives w/ friend. Ambulatory w/ walker at baseline, forgetful. Returned from [facility] a few days ago and was normal self. Yesterday morning she was noted to be breathing heavier. LAst night breathing even heavier and today also less responsive so EMS called. EMS noted O2 low 80s and placed on NRB w/ improvement  ED Course:  Vitals: T 102F (rectal) HR 90 /90 RR 22 O2 98% (nonrebreather)  Labs: Wbc 21.03 Hb 14.0 Plt 198 CO2 19 BUN/Cr 22/1.62 Trop 0.07 BNP 40,975 Alkphos 155  ALT 28  CXR Clear, UA neg  Given Tylenol 650mg, Solumedrol 120mg IV x1, Zosyn 3.375g IV x1, Vancomycin 1g IV x1, Duoneb x3, placed on BIPAP 88F PMH HTN, HLD, and "leaky valves" presents with shortness of breath for one day. Most of the history is provided by patient's niece Pebbles Kan (HCP) at bedside. The niece reports that the patient was in [place] for 3 weeks and got back 4 days ago. During the time the patient was away, she hasn't been taking her medications compliantly because of her forgetfullness. She resumed her medications yesterday. Yesterday morning the patient started feeling short of breath and progressively got worse over time with her breathing becoming more labored. Her niece noticed that by the end of the day she was gasping for air and decided to bring her into the emergency room. The niece says that 4 days ago when she picked her up from the airport, the patient forgot her coat on the plane and was walking in the cold to her car without her coat. Otherwise, she denies any fevers, chills, night sweats, chest pain, abdominal pain, n/v/d/c, sick contacts. She lives with a friend at home and ambulates using a walker.     ED Course:  Vitals: T 102F (rectal) HR 90 /90 RR 22 O2 98% (nonrebreather)  Labs: Wbc 21.03 Hb 14.0 Plt 198 CO2 19 BUN/Cr 22/1.62 Trop 0.07 BNP 40,975 Alkphos 155  ALT 28   CXR Clear, UA neg, EKG LBBB unchanged from prior  Given Tylenol 650mg, Solumedrol 120mg IV x1, Zosyn 3.375g IV x1, Vancomycin 1g IV x1, Duoneb x3, placed on BIPAP

## 2020-01-13 NOTE — H&P ADULT - PROBLEM SELECTOR PLAN 1
Presented with 2/4 SIRS criteria, febrile 102F (rectal), tachypneic to 22, Wbc 21.03. Held the fluids given that she was volume overloaded with crackles in the lungs, lower extremity edema, and shortness of breath requiring BIPAP. S/p Solumedrol 120mg IV x1, Zosyn 3.375g IV x1, Vancomycin 1g IV x1. Duoneb x3. CXR Clear, UA neg, RVP positive.   - started on tamiflu Presented with 3/4 SIRS criteria, febrile 102F (rectal), tachypneic to 22, leukocytosis Wbc 21.03. Held the fluids given that she was volume overloaded with crackles in the lungs, lower extremity edema, and shortness of breath requiring BIPAP. S/p Solumedrol 120mg IV x1, Zosyn 3.375g IV x1, Vancomycin 1g IV x1. Duoneb x3 in the ED. Trops elevated likely in the setting of demand ischemia. CXR Clear, UA neg, RVP positive.   - started on tamiflu  - f/u trop Presented with 3/4 SIRS criteria, febrile 102F (rectal), tachypneic to 22, leukocytosis Wbc 21.03. Held the fluids given that she was volume overloaded with crackles in the lungs, lower extremity edema, and shortness of breath requiring BIPAP. S/p Solumedrol 120mg IV x1, Zosyn 3.375g IV x1, Vancomycin 1g IV x1. Duoneb x3 in the ED. Trops elevated likely in the setting of demand ischemia. CXR Clear, UA neg, RVP positive.   - started on tamiflu 30mg BID  - f/u blood cultures  - f/u urine cultures Presented with 3/4 SIRS criteria, febrile 102F (rectal), tachypneic to 22, leukocytosis Wbc 21.03. Held the fluids given that she was volume overloaded with crackles in the lungs, lower extremity edema, and shortness of breath requiring BIPAP. S/p Solumedrol 120mg IV x1, Zosyn 3.375g IV x1, Vancomycin 1g IV x1. Duoneb x3 in the ED. Trops elevated likely in the setting of demand ischemia. CXR Clear, UA neg, RVP positive.   - started on tamiflu 30mg BID  - f/u blood cultures  - f/u urine cultures  - holding antihypertensives for now in the setting of sepsis, can restart appropriately when needed

## 2020-01-13 NOTE — ED PROVIDER NOTE - PROGRESS NOTE DETAILS
[place]: Vitals slightly improved. Still tachypneic but appears more comfortable on bipap. [place]: Leukocytosis, NICOLÁS. Also +troponin. Likely 2/2 chronic heart issues and sepsis. Will continue to trend. Pt DNR/DNI. Stable and lowly clinically improving on BIPAP. Will admit for further care. RVP pending. updated niece.

## 2020-01-13 NOTE — ED ADULT NURSE REASSESSMENT NOTE - NS ED NURSE REASSESS COMMENT FT1
Patient lethargic, SOB improved s/p BIPAP in place, Duoneb X 3, Solumedrol, does not appear to be in any pain or distress.  Sinus rhythm on cardiac monitor, vital signs stable.  Right Ac PIV #20 in place, all labs, blood cultures sent.  Rae Catheter Libyan #14 inserted aseptically, tolerated well.  Administered Solumedrol IVP,  Zosyn, Vancomycin IVPB completed, no adverse rxn.  Repositioned for comfort, HOB elevated > 45o.   For admit.  Transfer pending to ED Holding pending room assignment.

## 2020-01-13 NOTE — ED ADULT NURSE NOTE - NSIMPLEMENTINTERV_GEN_ALL_ED
Implemented All Fall with Harm Risk Interventions:  [place] to call system. Call bell, personal items and telephone within reach. Instruct patient to call for assistance. Room bathroom lighting operational. Non-slip footwear when patient is off stretcher. Physically safe environment: no spills, clutter or unnecessary equipment. Stretcher in lowest position, wheels locked, appropriate side rails in place. Provide visual cue, wrist band, yellow gown, etc. Monitor gait and stability. Monitor for mental status changes and reorient to person, place, and time. Review medications for side effects contributing to fall risk. Reinforce activity limits and safety measures with patient and family. Provide visual clues: red socks.

## 2020-01-13 NOTE — ED ADULT NURSE NOTE - PLAN OF CARE
Call bell/Fall precautions/Explanation of exam/test/Position of comfort/Side rails/I and O/NPO/Bedside visitors

## 2020-01-13 NOTE — H&P ADULT - PROBLEM SELECTOR PLAN 7
1) PCP Contacted on Admission: (Y/N) --> Name & Phone #:  2) Date of Contact with PCP:  3) PCP Contacted at Discharge: (Y/N)  4) Summary of Handoff Given to PCP:   5) Post-Discharge Appointment Date and Location: history of hyperlipidemia  - atorvastatin 40mg daily

## 2020-01-13 NOTE — H&P ADULT - NSHPPHYSICALEXAM_GEN_ALL_CORE
Vital Signs Last 12 Hrs  T(F): 99.7 (01-13-20 @ 15:45), Max: 102 (01-13-20 @ 13:02)  HR: 90 (01-13-20 @ 15:00) (84 - 101)  BP: 113/56 (01-13-20 @ 15:00) (106/53 - 180/90)  BP(mean): --  RR: 20 (01-13-20 @ 15:00) (20 - 24)  SpO2: 96% (01-13-20 @ 15:00) (94% - 99%)    PHYSICAL EXAM:  Constitutional: NAD, comfortable in bed.  HEENT: NC/AT, PERRLA, EOMI, no conjunctival pallor or scleral icterus, MMM  Neck: Supple, no JVD  Respiratory: CTA B/L. No w/r/r.   Cardiovascular: RRR, normal S1 and S2, no m/r/g.   Gastrointestinal: +BS, soft NTND, no guarding or rebound tenderness, no palpable masses   Extremities: wwp; no cyanosis, clubbing or edema.   Vascular: Pulses equal and strong throughout.   Neurological: AAOx3, no CN deficits, strength and sensation intact throughout.   Skin: No gross skin abnormalities or rashes Vital Signs Last 12 Hrs  T(F): 99.7 (01-13-20 @ 15:45), Max: 102 (01-13-20 @ 13:02)  HR: 90 (01-13-20 @ 15:00) (84 - 101)  BP: 113/56 (01-13-20 @ 15:00) (106/53 - 180/90)  BP(mean): --  RR: 20 (01-13-20 @ 15:00) (20 - 24)  SpO2: 96% (01-13-20 @ 15:00) (94% - 99%)    PHYSICAL EXAM:  Constitutional: frail, elderly female on BIPAP, labored breathing with accessory muscle use  HEENT: NC/AT, PERRLA, EOMI, no conjunctival pallor or scleral icterus, MMM  Neck: Supple, no JVD  Respiratory: labored breathing with accessary muscle use, crackles in anterior and posterior fields, no w/r/r.   Cardiovascular: RRR, normal S1 and S2, no m/r/g.   Gastrointestinal: +BS, soft NTND, no guarding or rebound tenderness, no palpable masses   Extremities: wwp; no cyanosis, clubbing.  Vascular: Pulses equal and strong throughout.   Neurological: AAOx3, no CN deficits, strength and sensation intact throughout.   Skin: No gross skin abnormalities or rashes

## 2020-01-13 NOTE — H&P ADULT - PROBLEM SELECTOR PLAN 2
BNP 40,975 Held the fluids given that she was volume overloaded with crackles in the lungs, lower extremity edema, and shortness of breath requiring BIPAP. Presented with one day history of worsening shortness of breath, crackles on exam, and edema in her bilateral lower extremities, BNP 40,975, likely CHF exacerbation secondary to the flu. Held the fluids given that she was volume overloaded requiring BIPAP. ABG pH 7.35 PO2 285  - f/u echo Presented with one day history of worsening shortness of breath, crackles on exam, and edema in her bilateral lower extremities, BNP 40,975, likely CHF exacerbation secondary to the flu. Held the fluids given that she was volume overloaded requiring BIPAP. ABG pH 7.35 PO2 285  - f/u echo  - s/p lasix 20mg IVP, will resume home lasix tomorrow  - decreased home dose of coreg from 25mg BID to 12.5mg BID  - holding home isosorbide mononitrate 30mg daily and enalapril 10mg daily Presented with one day history of worsening shortness of breath, crackles on exam, and edema in her bilateral lower extremities, BNP 40,975, likely CHF exacerbation secondary to the flu. Held the fluids given that she was volume overloaded requiring BIPAP. ABG pH 7.35 PO2 285  - f/u echo  - s/p lasix 20mg IVP, will resume home lasix tomorrow  - holding home dose of coreg from 25mg BID   - holding home isosorbide mononitrate 30mg daily  - holding enalapril 10mg daily Presented with one day history of worsening shortness of breath, crackles on exam, and edema in her bilateral lower extremities, BNP 40,975, likely CHF exacerbation secondary to the flu. Held the fluids given that she was volume overloaded requiring BIPAP. ABG pH 7.35 PO2 285  - f/u echo  - s/p lasix 20mg IVP, will resume home lasix tomorrow  - restarted home digoxin 0.125mg every other day and ivabradine 5mg twice a day with meals (obtain collateral from cardiologist Dr. Jamaal Shaw [phone])  - holding home dose of coreg from 25mg BID   - holding home isosorbide mononitrate 30mg daily  - holding enalapril 10mg daily

## 2020-01-13 NOTE — H&P ADULT - PROBLEM SELECTOR PLAN 4
history of hyperlipidemia  - atorvastatin 40mg daily presenting with an elevated troponin of 0.07 in the setting of demand ischemia   - EKG with no ST changes, LBBB unchanged from prior EKG    #?cardiac arrythmia  pt on digoxin 0.125mcg abd ivabradine 5mg at home. Unsure why.   -obtain more collateral    # elevated INR  - INR 1.2 likely in the setting of poor PO intake and vit K def presenting with an elevated troponin of 0.07 in the setting of demand ischemia   - EKG with no ST changes, LBBB unchanged from prior EKG    #?cardiac arrythmia  pt on digoxin 0.125mg every other day and ivabradine 5mg at home with meals. Unsure why. Will continue. Called cardiologist Dr. Jamaal Shaw for more information [phone]  -obtain more collateral    # elevated INR  - INR 1.2 likely in the setting of poor PO intake and vit K def

## 2020-01-13 NOTE — ED PROVIDER NOTE - OBJECTIVE STATEMENT
88F PMH mild dementia, "leaky valves,"  HTN, HLD, p/w SOB.  Pt unable to provide any hx 2/2 illness. Provided by mitul Kan (HCP) at bedside. Pt lives w/ friend. Ambulatory w/ walker at baseline, forgetful. Returned from [facility] a few days ago and was normal self. Yesterday morning she was noted to be breathing heavier. LAst night breathing even heavier and today also less responsive so EMS called. EMS noted O2 low 80s and placed on NRB w/ improvement. No reported NVD, cough, fevers.   meds: digoxin 0.125 every other day, carvedilol 25 bid, isosorbide mononitrate 30mg ER qd, enalapril 10bid, levothyroxine 100mcg qd, velafaxine ER 75mg qd, corlanor 5mg w/ meals, gabapentin 100mg BID, atorvastatin 40qd, Also on an unknown water pill. 68 y/o M patient presents to the ED w/ dizziness and headache that began last night w/ suspicion of a heat-related illness as per patient. Patient notes he was short of breath before but now is not.  pt mentioned he does not have A/C at home with hot weather these days. Patient denies LOC or passing out. Patient denies vomiting, fever, diarrhea, or urinary symptoms. Patient denies smoking, EtOH use, or any other drug use. NKDA.  In ED :     EKG and labs are unremarkable. 68 y/o M poor historian patient presents to the ED w/ dizziness and headache that began last night w/ suspicion of a heat-related illness as per patient. Patient notes he was short of breath before but now is not.  pt mentioned he does not have A/C at home with hot weather these days. Patient denies LOC or passing out. Patient denies vomiting, fever, diarrhea, or urinary symptoms. Patient denies smoking, EtOH use, or any other drug use. NKDA.  In ED :     EKG and labs are unremarkable. 68 y/o M poor historian patient presents to the ED w/ dizziness and headache that began last night w/ suspicion of a heat-related illness as per patient. Patient notes he was short of breath before but now is not.  Patient mentioned he does not have A/C at home with hot weather these days. Patient denies LOC or passing out. Patient denies vomiting, fever, diarrhea, or urinary symptoms. Patient denies smoking, EtOH use, or any other drug use. NKDA.  In ED:   EKG and labs are unremarkable.

## 2020-01-13 NOTE — ED PROVIDER NOTE - CRITICAL CARE PROVIDED
interpretation of diagnostic studies/documentation/conducted a detailed discussion of DNR status/direct patient care (not related to procedure)/additional history taking

## 2020-01-13 NOTE — ED PROVIDER NOTE - CLINICAL SUMMARY MEDICAL DECISION MAKING FREE TEXT BOX
88F PMH mild dementia, "leaky valves,"  HTN, HLD, p/w SOB. Returned from [facility] a few days ago and was normal self. Yesterday morning she was noted to be breathing heavier. LAst night breathing even heavier and today also less responsive so EMS called. EMS noted O2 low 80s and placed on NRB w/ improvement. Pt satting well on NRB but tachypneic, febrile, other vitals wnl. EXam as above. Moderate resp distress, ill appearing.  ddx: sepsis. Respiratory failure likely multifactorial - infectious, RAD, fluid overload.   Placed on bipap. Sepsis w/u. Empiric abx. CXR. Holding IVF given likely fluid overload. EKG grossly unchanged from prior ekg. Reassess.  Confirmed DNR/DNI status w/ niece.

## 2020-01-13 NOTE — ED ADULT TRIAGE NOTE - OTHER COMPLAINTS
hx of CHF, HTN. patient arrives on 15 L NRB, 80% on RA as per EMS. patient lethargic. upgraded to MD Adams

## 2020-01-13 NOTE — H&P ADULT - PROBLEM SELECTOR PLAN 3
history of hypertension  - carvedilol 25mg BID  - isosorbide mononitrate 30mg daily,   - Enalapril 10mg BID presenting with cr 1.62 (baseline 0.9) etiology likely prerenal in the setting of sepsis and flu v cardiorenal in the setting of heart failure exacerbation   - obtain urine lytes  - avoid nephrotoxic agents  - holding home dose of enalapril

## 2020-01-13 NOTE — ED PROVIDER NOTE - PHYSICAL EXAMINATION
unofficial bedside sono: b-line predominant, hypocontractility  no LE edema, normal equal distal pulses. Cool distal extremities.   +abd breathing and accessory muscle use. Diffuse wheezing/rhonchi.   Sleepy, easily arousable, A and O to person.  rectal temp w/ RN carey chaperone: scant brown stool. unofficial bedside sono: b-line predominant, hypocontractility  no LE edema, normal equal distal pulses. Cool distal extremities.   +abd breathing and accessory muscle use. Diffuse wheezing/rhonchi.   Sleepy, easily arousable, A and O to person.  rectal temp w/ RN carey chaperone: scant brown stool. 102 rectal temp.

## 2020-01-13 NOTE — H&P ADULT - ATTENDING COMMENTS
Patient seen and examined at bedside. Agree with exam, a/p as above with the following addendum/edits:     88 year old F p/w sepsis 2/2 influenza c/b acute heart failure exacerbation. Initially on BIPAP for work of breathing, on HFNC when I examined her. Some baseline dementia, family at bedside, she is able to follow commands and answer questions. On exam, she is breathing comfortably on HFNC, dry MM, RRR, lungs w/ basilar crackles however difficult to auscultate 2/2 poor effort, abd soft NTND, w/ right leg twitches (chronic). She was given IV lasix for pulm edema given crackles on exam, elevated BNP. Her CXR is not as impressive as one would assume for lung exam, no infiltrates on CXR. Would repeat CXR in AM. C/w Tamiflu. Would monitor closely overnight. Obtain collateral from cardiologist as on HF meds and anti-arrhythmics. Restart home anti-arrhythmics.

## 2020-01-13 NOTE — H&P ADULT - PROBLEM SELECTOR PLAN 6
F: none  E:  Replete K<4, Mg< 2  N: NPO history of hypertension, currently normotensive  - carvedilol 25mg BID at home, decreased to 12.5mg BID, can uptitrate as needed  - holding home isosorbide mononitrate 30mg daily   - holding home Enalapril 10mg BID history of hypertension, currently normotensive  - holding home carvedilol 25mg BID at home  - holding home isosorbide mononitrate 30mg daily   - holding home Enalapril 10mg BID  - patient currently normotensive but can restart antihypertensives appropriately as needed.

## 2020-01-13 NOTE — ED ADULT TRIAGE NOTE - SOURCE OF INFORMATION
Infectious Disease Note


Subjective


Subjective


somnolent





ROS


ROS


unable to do





Vital Sign


Vital Signs





Vital Signs








  Date Time  Temp Pulse Resp B/P (MAP) Pulse Ox O2 Delivery O2 Flow Rate FiO2


 


12/11/17 07:45     96 BiPAP/CPAP  


 


12/11/17 06:00  75 18 124/76 (92)    


 


12/11/17 04:00 98.5       





 98.5       


 


12/10/17 16:47       15.0 











Physical Exam


PHYSICAL EXAM


GENERAL:  NAD, lethargic


HEENT:  PERRL, OC/OP


NECK:  Supple, no JVD, no LN


LUNGS:  Clear


HEART:  S1S2, no gallop, no murmur


ABD:  Soft, NT, no organomegaly, no rebound


EXT:  No edema, no cyanosis


CNS:  lethargic


SKIN:  No rash


IV: ok





Labs


Lab





Laboratory Tests








Test


  12/10/17


10:57 12/10/17


11:02 12/10/17


11:09 12/10/17


11:35


 


O2 Saturation 90 % (92-99)    


 


Arterial Blood pH


  7.34


(7.35-7.45) 


  


  


 


 


Arterial Blood pCO2 at


Patient Temp 78 mmHg


(35-46) 


  


  


 


 


Arterial Blood pO2 at Patient


Temp 61 mmHg


() 


  


  


 


 


Arterial Blood HCO3


  41 mmol/L


(21-28) 


  


  


 


 


Arterial Blood Base Excess


  11 mmol/L


(-3-3) 


  


  


 


 


Oxyhemoglobin 87.3 %    


 


Methemoglobin


  0.5 %


(0.0-1.9) 


  


  


 


 


Carbon Monoxide, Quantitative


  2.6 %


(0.0-1.9) 


  


  


 


 


FiO2 45    


 


White Blood Count


  


  9.0 x10^3/uL


(4.0-11.0) 


  


 


 


Red Blood Count


  


  5.81 x10^6/uL


(4.30-5.70) 


  


 


 


Hemoglobin


  


  16.3 g/dL


(13.0-17.5) 


  


 


 


Hematocrit


  


  50.7 %


(39.0-53.0) 


  


 


 


Mean Corpuscular Volume  87 fL ()   


 


Mean Corpuscular Hemoglobin  28 pg (25-35)   


 


Mean Corpuscular Hemoglobin


Concent 


  32 g/dL


(31-37) 


  


 


 


Red Cell Distribution Width


  


  16.5 %


(11.5-14.5) 


  


 


 


Platelet Count


  


  216 x10^3/uL


(140-400) 


  


 


 


Neutrophils (%) (Auto)  84 % (31-73)   


 


Lymphocytes (%) (Auto)  9 % (24-48)   


 


Monocytes (%) (Auto)  6 % (0-9)   


 


Eosinophils (%) (Auto)  0 % (0-3)   


 


Basophils (%) (Auto)  0 % (0-3)   


 


Neutrophils # (Auto)


  


  7.6 x10^3uL


(1.8-7.7) 


  


 


 


Lymphocytes # (Auto)


  


  0.8 x10^3/uL


(1.0-4.8) 


  


 


 


Monocytes # (Auto)


  


  0.6 x10^3/uL


(0.0-1.1) 


  


 


 


Eosinophils # (Auto)


  


  0.0 x10^3/uL


(0.0-0.7) 


  


 


 


Basophils # (Auto)


  


  0.0 x10^3/uL


(0.0-0.2) 


  


 


 


Prothrombin Time


  


  13.1 SEC


(11.7-14.0) 


  


 


 


Prothromb Time International


Ratio 


  1.1 (0.8-1.1) 


  


  


 


 


Activated Partial


Thromboplast Time 


  27 SEC (24-38) 


  


  


 


 


Sodium Level


  


  134 mmol/L


(136-145) 


  


 


 


Potassium Level


  


  5.5 mmol/L


(3.5-5.1) 


  


 


 


Chloride Level


  


  92 mmol/L


() 


  


 


 


Carbon Dioxide Level


  


  36 mmol/L


(21-32) 


  


 


 


Anion Gap  6 (6-14)   


 


Blood Urea Nitrogen


  


  20 mg/dL


(8-26) 


  


 


 


Creatinine


  


  1.6 mg/dL


(0.7-1.3) 


  


 


 


Estimated GFR


(Cockcroft-Gault) 


  44.5 


  


  


 


 


BUN/Creatinine Ratio  13 (6-20)   


 


Glucose Level


  


  129 mg/dL


(70-99) 


  


 


 


Lactic Acid Level


  


  2.1 mmol/L


(0.4-2.0) 


  


 


 


Calcium Level


  


  8.6 mg/dL


(8.5-10.1) 


  


 


 


Magnesium Level


  


  2.1 mg/dL


(1.8-2.4) 


  


 


 


Total Bilirubin


  


  0.4 mg/dL


(0.2-1.0) 


  


 


 


Aspartate Amino Transf


(AST/SGOT) 


  15 U/L (15-37) 


  


  


 


 


Alanine Aminotransferase


(ALT/SGPT) 


  20 U/L (16-63) 


  


  


 


 


Alkaline Phosphatase


  


  103 U/L


() 


  


 


 


Ammonia


  


  < 10 mcmol/L


(11-34) 


  


 


 


Troponin I Quantitative


  


  0.232 ng/mL


(0.000-0.055) 


  


 


 


NT-Pro-B-Type Natriuretic


Peptide 


  2185 pg/mL


(0-124) 


  


 


 


Total Protein


  


  7.1 g/dL


(6.4-8.2) 


  


 


 


Albumin


  


  3.3 g/dL


(3.4-5.0) 


  


 


 


Albumin/Globulin Ratio  0.9 (1.0-1.7)   


 


Lipase


  


  108 U/L


() 


  


 


 


Thyroid Stimulating Hormone


(TSH) 


  0.152 uIU/mL


(0.358-3.74) 


  


 


 


Ethyl Alcohol Level


  


  < 10 mg/dL


(0-10) 


  


 


 


Influenza Type A Antigen


  


  


  Negative


(NEGATIVE) 


 


 


Influenza Type B Antigen


  


  


  Negative


(NEGATIVE) 


 


 


Urine Collection Type    U cath 


 


Urine Color    Yellow 


 


Urine Clarity    Cloudy 


 


Urine pH    6.0 


 


Urine Specific Gravity    1.015 


 


Urine Protein


  


  


  


  30 mg/dL


(NEG-TRACE)


 


Urine Glucose (UA)


  


  


  


  Negative mg/dL


(NEG)


 


Urine Ketones (Stick)


  


  


  


  Negative mg/dL


(NEG)


 


Urine Blood    Small (NEG) 


 


Urine Nitrite    Positive (NEG) 


 


Urine Bilirubin    Negative (NEG) 


 


Urine Urobilinogen Dipstick


  


  


  


  0.2 mg/dL (0.2


mg/dL)


 


Urine Leukocyte Esterase    Moderate (NEG) 


 


Urine RBC


  


  


  


  Rare /HPF


(0-2)


 


Urine WBC


  


  


  


  11-20 /HPF


(0-4)


 


Urine Squamous Epithelial


Cells 


  


  


  Occ /LPF 


 


 


Urine Bacteria


  


  


  


  Many /HPF


(0-FEW)


 


Urine Hyaline Casts    Few /HPF 


 


Urine Opiates Screen    Pos (NEG) 


 


Urine Methadone Screen    Neg (NEG) 


 


Urine Barbiturates    Neg (NEG) 


 


Urine Phencyclidine Screen    Neg (NEG) 


 


Urine


Amphetamine/Methamphetamine 


  


  


  Neg (NEG) 


 


 


Urine Benzodiazepines Screen    Neg (NEG) 


 


Urine Cocaine Screen    Neg (NEG) 


 


Urine Cannabinoids Screen    Neg (NEG) 


 


Urine Ethyl Alcohol    Neg (NEG) 


 


Test


  12/10/17


12:30 12/10/17


14:00 12/10/17


18:05 12/10/17


20:39


 


Lactic Acid Level


  2.0 mmol/L


(0.4-2.0) 2.1 mmol/L


(0.4-2.0) 


  


 


 


Troponin I Quantitative


  


  


  1.253 ng/mL


(0.000-0.055) 


 


 


O2 Saturation    93 % (92-99) 


 


Arterial Blood pH


  


  


  


  7.38


(7.35-7.45)


 


Arterial Blood pCO2 at


Patient Temp 


  


  


  59 mmHg


(35-46)


 


Arterial Blood pO2 at Patient


Temp 


  


  


  68 mmHg


()


 


Arterial Blood HCO3


  


  


  


  33 mmol/L


(21-28)


 


Arterial Blood Base Excess


  


  


  


  6 mmol/L


(-3-3)


 


FiO2    40 


 


Test


  12/11/17


00:25 12/11/17


04:55 12/11/17


07:45 


 


 


Troponin I Quantitative


  1.004 ng/mL


(0.000-0.055) 


  


  


 


 


White Blood Count


  


  5.9 x10^3/uL


(4.0-11.0) 


  


 


 


Red Blood Count


  


  5.53 x10^6/uL


(4.30-5.70) 


  


 


 


Hemoglobin


  


  15.7 g/dL


(13.0-17.5) 


  


 


 


Hematocrit


  


  48.4 %


(39.0-53.0) 


  


 


 


Mean Corpuscular Volume  88 fL ()   


 


Mean Corpuscular Hemoglobin  28 pg (25-35)   


 


Mean Corpuscular Hemoglobin


Concent 


  32 g/dL


(31-37) 


  


 


 


Red Cell Distribution Width


  


  16.3 %


(11.5-14.5) 


  


 


 


Platelet Count


  


  153 x10^3/uL


(140-400) 


  


 


 


Neutrophils (%) (Auto)  70 % (31-73)   


 


Lymphocytes (%) (Auto)  22 % (24-48)   


 


Monocytes (%) (Auto)  8 % (0-9)   


 


Eosinophils (%) (Auto)  0 % (0-3)   


 


Basophils (%) (Auto)  0 % (0-3)   


 


Neutrophils # (Auto)


  


  4.1 x10^3uL


(1.8-7.7) 


  


 


 


Lymphocytes # (Auto)


  


  1.3 x10^3/uL


(1.0-4.8) 


  


 


 


Monocytes # (Auto)


  


  0.5 x10^3/uL


(0.0-1.1) 


  


 


 


Eosinophils # (Auto)


  


  0.0 x10^3/uL


(0.0-0.7) 


  


 


 


Basophils # (Auto)


  


  0.0 x10^3/uL


(0.0-0.2) 


  


 


 


Sodium Level


  


  137 mmol/L


(136-145) 


  


 


 


Potassium Level


  


  4.1 mmol/L


(3.5-5.1) 


  


 


 


Chloride Level


  


  94 mmol/L


() 


  


 


 


Carbon Dioxide Level


  


  36 mmol/L


(21-32) 


  


 


 


Anion Gap  7 (6-14)   


 


Blood Urea Nitrogen


  


  19 mg/dL


(8-26) 


  


 


 


Creatinine


  


  1.2 mg/dL


(0.7-1.3) 


  


 


 


Estimated GFR


(Cockcroft-Gault) 


  62.0 


  


  


 


 


Glucose Level


  


  169 mg/dL


(70-99) 


  


 


 


Calcium Level


  


  8.6 mg/dL


(8.5-10.1) 


  


 


 


Magnesium Level


  


  2.1 mg/dL


(1.8-2.4) 


  


 


 


O2 Saturation   91 % (92-99)  


 


Arterial Blood pH


  


  


  7.37


(7.35-7.45) 


 


 


Arterial Blood pCO2 at


Patient Temp 


  


  71 mmHg


(35-46) 


 


 


Arterial Blood pO2 at Patient


Temp 


  


  66 mmHg


() 


 


 


Arterial Blood HCO3


  


  


  40 mmol/L


(21-28) 


 


 


Arterial Blood Base Excess


  


  


  12 mmol/L


(-3-3) 


 


 


FiO2   40  











Objective


Assessment


Encephalopathy


CO2 retention


COPD exacerbation


UTI


Lactic acidosis


ARF





Plan


Plan of Care


fluids


bipap not working, co2 higher


zyvox and zosyn


check cultures


supportive care


may need intubation











RENITA LOZA MD Dec 11, 2017 09:55 EMS/Patient

## 2020-01-13 NOTE — H&P ADULT - PROBLEM SELECTOR PLAN 5
- niece at bedside reports progressive worsening of memory and forgetfulness in the past 2 years with no workup. Patient currently AAOX3. Able to answer questions appropriately and follow commands. presenting with alkphos 155 and  likely in the setting of sepsis v hepatic congestion.  - daily LFTs  - if worsening obtain RUQ ultrasound

## 2020-01-13 NOTE — H&P ADULT - ASSESSMENT
88F PMH HTN, HLD, and "leaky valves" presents with shortness of breath for one day admitted for sepsis secondary to influenza. 88F PMH HTN, HLD, and "leaky valves" presents with shortness of breath for one day admitted for sepsis secondary to influenza with underlying heart failure exacerbation. 88F PMH HTN, HLD, and "leaky valves" presents with shortness of breath for one day admitted for sepsis secondary to influenza in the setting of heart failure exacerbation.

## 2020-01-13 NOTE — H&P ADULT - PROBLEM SELECTOR PLAN 8
- niece at bedside reports progressive worsening of memory and forgetfulness in the past 2 years with no workup. Patient currently AAOX3. Able to answer questions appropriately and follow commands.

## 2020-01-14 DIAGNOSIS — R78.81 BACTEREMIA: ICD-10-CM

## 2020-01-14 DIAGNOSIS — A41.9 SEPSIS, UNSPECIFIED ORGANISM: ICD-10-CM

## 2020-01-14 DIAGNOSIS — J96.01 ACUTE RESPIRATORY FAILURE WITH HYPOXIA: ICD-10-CM

## 2020-01-14 LAB
ALBUMIN SERPL ELPH-MCNC: 3.2 G/DL — LOW (ref 3.3–5)
ALP SERPL-CCNC: 107 U/L — SIGNIFICANT CHANGE UP (ref 40–120)
ALT FLD-CCNC: 100 U/L — HIGH (ref 10–45)
ANION GAP SERPL CALC-SCNC: 15 MMOL/L — SIGNIFICANT CHANGE UP (ref 5–17)
ANION GAP SERPL CALC-SCNC: 18 MMOL/L — HIGH (ref 5–17)
AST SERPL-CCNC: 220 U/L — HIGH (ref 10–40)
BILIRUB SERPL-MCNC: 0.6 MG/DL — SIGNIFICANT CHANGE UP (ref 0.2–1.2)
BUN SERPL-MCNC: 39 MG/DL — HIGH (ref 7–23)
BUN SERPL-MCNC: 44 MG/DL — HIGH (ref 7–23)
CALCIUM SERPL-MCNC: 7.1 MG/DL — LOW (ref 8.4–10.5)
CALCIUM SERPL-MCNC: 7.8 MG/DL — LOW (ref 8.4–10.5)
CHLORIDE SERPL-SCNC: 101 MMOL/L — SIGNIFICANT CHANGE UP (ref 96–108)
CHLORIDE SERPL-SCNC: 104 MMOL/L — SIGNIFICANT CHANGE UP (ref 96–108)
CO2 SERPL-SCNC: 17 MMOL/L — LOW (ref 22–31)
CO2 SERPL-SCNC: 17 MMOL/L — LOW (ref 22–31)
CREAT ?TM UR-MCNC: 62 MG/DL — SIGNIFICANT CHANGE UP
CREAT SERPL-MCNC: 2.38 MG/DL — HIGH (ref 0.5–1.3)
CREAT SERPL-MCNC: 2.68 MG/DL — HIGH (ref 0.5–1.3)
GLUCOSE SERPL-MCNC: 139 MG/DL — HIGH (ref 70–99)
GLUCOSE SERPL-MCNC: 165 MG/DL — HIGH (ref 70–99)
GRAM STN FLD: SIGNIFICANT CHANGE UP
GRAM STN FLD: SIGNIFICANT CHANGE UP
HBA1C BLD-MCNC: 5.7 % — HIGH (ref 4–5.6)
HCT VFR BLD CALC: 39.5 % — SIGNIFICANT CHANGE UP (ref 34.5–45)
HGB BLD-MCNC: 13 G/DL — SIGNIFICANT CHANGE UP (ref 11.5–15.5)
LEGIONELLA AG UR QL: NEGATIVE — SIGNIFICANT CHANGE UP
MAGNESIUM SERPL-MCNC: 1.9 MG/DL — SIGNIFICANT CHANGE UP (ref 1.6–2.6)
MAGNESIUM SERPL-MCNC: 2.4 MG/DL — SIGNIFICANT CHANGE UP (ref 1.6–2.6)
MCHC RBC-ENTMCNC: 30.2 PG — SIGNIFICANT CHANGE UP (ref 27–34)
MCHC RBC-ENTMCNC: 32.9 GM/DL — SIGNIFICANT CHANGE UP (ref 32–36)
MCV RBC AUTO: 91.6 FL — SIGNIFICANT CHANGE UP (ref 80–100)
METHOD TYPE: SIGNIFICANT CHANGE UP
MRSA PCR RESULT.: POSITIVE
MRSA SPEC QL CULT: SIGNIFICANT CHANGE UP
NRBC # BLD: 0 /100 WBCS — SIGNIFICANT CHANGE UP (ref 0–0)
OSMOLALITY UR: 308 MOSMOL/KG — SIGNIFICANT CHANGE UP (ref 100–650)
PHOSPHATE SERPL-MCNC: 3.9 MG/DL — SIGNIFICANT CHANGE UP (ref 2.5–4.5)
PHOSPHATE SERPL-MCNC: 5.1 MG/DL — HIGH (ref 2.5–4.5)
PLATELET # BLD AUTO: 128 K/UL — LOW (ref 150–400)
POTASSIUM SERPL-MCNC: 3.5 MMOL/L — SIGNIFICANT CHANGE UP (ref 3.5–5.3)
POTASSIUM SERPL-MCNC: 4.9 MMOL/L — SIGNIFICANT CHANGE UP (ref 3.5–5.3)
POTASSIUM SERPL-SCNC: 3.5 MMOL/L — SIGNIFICANT CHANGE UP (ref 3.5–5.3)
POTASSIUM SERPL-SCNC: 4.9 MMOL/L — SIGNIFICANT CHANGE UP (ref 3.5–5.3)
PROCALCITONIN SERPL-MCNC: 83 NG/ML — HIGH (ref 0.02–0.1)
PROT SERPL-MCNC: 6.1 G/DL — SIGNIFICANT CHANGE UP (ref 6–8.3)
RBC # BLD: 4.31 M/UL — SIGNIFICANT CHANGE UP (ref 3.8–5.2)
RBC # FLD: 13.1 % — SIGNIFICANT CHANGE UP (ref 10.3–14.5)
S AUREUS DNA NOSE QL NAA+PROBE: POSITIVE
SODIUM SERPL-SCNC: 136 MMOL/L — SIGNIFICANT CHANGE UP (ref 135–145)
SODIUM SERPL-SCNC: 136 MMOL/L — SIGNIFICANT CHANGE UP (ref 135–145)
SODIUM UR-SCNC: <20 MMOL/L — SIGNIFICANT CHANGE UP
WBC # BLD: 13.84 K/UL — HIGH (ref 3.8–10.5)
WBC # FLD AUTO: 13.84 K/UL — HIGH (ref 3.8–10.5)

## 2020-01-14 PROCEDURE — 99233 SBSQ HOSP IP/OBS HIGH 50: CPT | Mod: GC

## 2020-01-14 PROCEDURE — 71045 X-RAY EXAM CHEST 1 VIEW: CPT | Mod: 26

## 2020-01-14 RX ORDER — SODIUM CHLORIDE 9 MG/ML
500 INJECTION INTRAMUSCULAR; INTRAVENOUS; SUBCUTANEOUS
Refills: 0 | Status: DISCONTINUED | OUTPATIENT
Start: 2020-01-14 | End: 2020-01-16

## 2020-01-14 RX ORDER — POTASSIUM CHLORIDE 20 MEQ
40 PACKET (EA) ORAL ONCE
Refills: 0 | Status: COMPLETED | OUTPATIENT
Start: 2020-01-14 | End: 2020-01-14

## 2020-01-14 RX ORDER — VANCOMYCIN HCL 1 G
1250 VIAL (EA) INTRAVENOUS ONCE
Refills: 0 | Status: COMPLETED | OUTPATIENT
Start: 2020-01-14 | End: 2020-01-14

## 2020-01-14 RX ORDER — MAGNESIUM SULFATE 500 MG/ML
1 VIAL (ML) INJECTION ONCE
Refills: 0 | Status: COMPLETED | OUTPATIENT
Start: 2020-01-14 | End: 2020-01-14

## 2020-01-14 RX ORDER — VANCOMYCIN HCL 1 G
1250 VIAL (EA) INTRAVENOUS EVERY 12 HOURS
Refills: 0 | Status: DISCONTINUED | OUTPATIENT
Start: 2020-01-14 | End: 2020-01-14

## 2020-01-14 RX ORDER — FUROSEMIDE 40 MG
60 TABLET ORAL ONCE
Refills: 0 | Status: COMPLETED | OUTPATIENT
Start: 2020-01-14 | End: 2020-01-14

## 2020-01-14 RX ORDER — CEFTRIAXONE 500 MG/1
1000 INJECTION, POWDER, FOR SOLUTION INTRAMUSCULAR; INTRAVENOUS EVERY 24 HOURS
Refills: 0 | Status: DISCONTINUED | OUTPATIENT
Start: 2020-01-14 | End: 2020-01-14

## 2020-01-14 RX ORDER — SODIUM CHLORIDE 9 MG/ML
1000 INJECTION INTRAMUSCULAR; INTRAVENOUS; SUBCUTANEOUS
Refills: 0 | Status: DISCONTINUED | OUTPATIENT
Start: 2020-01-14 | End: 2020-01-14

## 2020-01-14 RX ORDER — POTASSIUM CHLORIDE 20 MEQ
10 PACKET (EA) ORAL ONCE
Refills: 0 | Status: COMPLETED | OUTPATIENT
Start: 2020-01-14 | End: 2020-01-14

## 2020-01-14 RX ADMIN — Medication 100 MILLIGRAM(S): at 13:33

## 2020-01-14 RX ADMIN — SODIUM CHLORIDE 70 MILLILITER(S): 9 INJECTION INTRAMUSCULAR; INTRAVENOUS; SUBCUTANEOUS at 19:07

## 2020-01-14 RX ADMIN — Medication 30 MILLIGRAM(S): at 17:01

## 2020-01-14 RX ADMIN — Medication 75 MILLIGRAM(S): at 06:44

## 2020-01-14 RX ADMIN — ENOXAPARIN SODIUM 30 MILLIGRAM(S): 100 INJECTION SUBCUTANEOUS at 17:11

## 2020-01-14 RX ADMIN — Medication 166.67 MILLIGRAM(S): at 17:01

## 2020-01-14 RX ADMIN — GABAPENTIN 100 MILLIGRAM(S): 400 CAPSULE ORAL at 17:01

## 2020-01-14 RX ADMIN — CEFTRIAXONE 100 MILLIGRAM(S): 500 INJECTION, POWDER, FOR SOLUTION INTRAMUSCULAR; INTRAVENOUS at 09:08

## 2020-01-14 RX ADMIN — Medication 75 MILLIGRAM(S): at 17:01

## 2020-01-14 RX ADMIN — Medication 30 MILLIGRAM(S): at 06:44

## 2020-01-14 RX ADMIN — Medication 60 MILLIGRAM(S): at 19:13

## 2020-01-14 RX ADMIN — Medication 40 MILLIEQUIVALENT(S): at 09:08

## 2020-01-14 RX ADMIN — IVABRADINE 5 MILLIGRAM(S): 7.5 TABLET, FILM COATED ORAL at 19:58

## 2020-01-14 RX ADMIN — ATORVASTATIN CALCIUM 40 MILLIGRAM(S): 80 TABLET, FILM COATED ORAL at 21:26

## 2020-01-14 RX ADMIN — Medication 100 GRAM(S): at 09:41

## 2020-01-14 RX ADMIN — GABAPENTIN 100 MILLIGRAM(S): 400 CAPSULE ORAL at 06:44

## 2020-01-14 RX ADMIN — SODIUM CHLORIDE 125 MILLILITER(S): 9 INJECTION INTRAMUSCULAR; INTRAVENOUS; SUBCUTANEOUS at 12:48

## 2020-01-14 RX ADMIN — Medication 100 MICROGRAM(S): at 06:45

## 2020-01-14 RX ADMIN — IVABRADINE 5 MILLIGRAM(S): 7.5 TABLET, FILM COATED ORAL at 06:50

## 2020-01-14 RX ADMIN — Medication 10 MILLIEQUIVALENT(S): at 11:15

## 2020-01-14 NOTE — PROGRESS NOTE ADULT - PROBLEM SELECTOR PROBLEM 10
Transition of care performed with sharing of clinical summary Acute respiratory failure with hypoxia

## 2020-01-14 NOTE — PROGRESS NOTE ADULT - SUBJECTIVE AND OBJECTIVE BOX
INTERVAL HPI/OVERNIGHT EVENTS:    OVERNIGHT: No overnight events.  SUBJECTIVE: Patient seen and examined at bedside. Was on BiPAP overnight and transitioned to HFNC at 40 L/min. Was breathing comfortably with no distress. Hebrew-speaking. Attempt was made to use  (ID [id number]), but patient was not appropriately responding to  prompts. Limited ROS obtained, during which patient stated that she had no CP, SOB, fever/chills.        OBJECTIVE:    VITAL SIGNS:  ICU Vital Signs Last 24 Hrs  T(C): 37 (14 Jan 2020 05:44), Max: 38.9 (13 Jan 2020 13:02)  T(F): 98.6 (14 Jan 2020 05:44), Max: 102 (13 Jan 2020 13:02)  HR: 80 (14 Jan 2020 08:48) (80 - 101)  BP: 126/71 (14 Jan 2020 05:44) (106/53 - 180/90)  BP(mean): --  ABP: --  ABP(mean): --  RR: 17 (14 Jan 2020 08:48) (17 - 24)  SpO2: 95% (14 Jan 2020 08:48) (93% - 99%)        CAPILLARY BLOOD GLUCOSE          PHYSICAL EXAM:  General: NAD, comfortable, on HFNC  HEENT: NCAT, PERRL, clear conjunctiva, no scleral icterus  Neck: supple, no JVD  Respiratory: reduced breath sounds posterior base bilaterally; crackles heard on right lower base  Cardiovascular: RRR, normal S1S2, no M/R/G  Vascular: 2+ radial and DP pulses  Abdomen: soft, NT/ND, bowel sounds in all four quadrants, no palpable masses  Extremities: WWP, no clubbing, cyanosis, or edema  Skin: No rashes present  Neuro: A&Ox1 (person only), NFD, cranial nerves in tact     MEDICATIONS:  MEDICATIONS  (STANDING):  atorvastatin 40 milliGRAM(s) Oral daily  cefTRIAXone   IVPB 1000 milliGRAM(s) IV Intermittent every 24 hours  digoxin     Tablet 0.125 milliGRAM(s) Oral every other day  enoxaparin Injectable 30 milliGRAM(s) SubCutaneous every 24 hours  gabapentin 100 milliGRAM(s) Oral every 12 hours  influenza   Vaccine 0.5 milliLiter(s) IntraMuscular once  ivabradine 5 milliGRAM(s) Oral two times a day  levothyroxine 100 MICROGram(s) Oral daily  oseltamivir 30 milliGRAM(s) Oral every 12 hours  potassium chloride   Solution 10 milliEquivalent(s) Oral once  venlafaxine 75 milliGRAM(s) Oral every 12 hours    MEDICATIONS  (PRN):      ALLERGIES:  Allergies    No Known Allergies    Intolerances        LABS:                        13.0   13.84 )-----------( 128      ( 14 Jan 2020 07:23 )             39.5     01-14    136  |  101  |  39<H>  ----------------------------<  139<H>  3.5   |  17<L>  |  2.38<H>    Ca    7.8<L>      14 Jan 2020 07:23  Phos  5.1     01-14  Mg     1.9     01-14    TPro  6.1  /  Alb  3.2<L>  /  TBili  0.6  /  DBili  x   /  AST  220<H>  /  ALT  100<H>  /  AlkPhos  107  01-14    PT/INR - ( 13 Jan 2020 13:27 )   PT: 13.8 sec;   INR: 1.20          PTT - ( 13 Jan 2020 13:27 )  PTT:37.7 sec  Urinalysis Basic - ( 13 Jan 2020 13:56 )    Color: Yellow / Appearance: Hazy / SG: >=1.030 / pH: x  Gluc: x / Ketone: NEGATIVE  / Bili: Negative / Urobili: 0.2 E.U./dL   Blood: x / Protein: 100 mg/dL / Nitrite: NEGATIVE   Leuk Esterase: NEGATIVE / RBC: < 5 /HPF / WBC < 5 /HPF   Sq Epi: x / Non Sq Epi: Many /HPF / Bacteria: Many /HPF        RADIOLOGY & ADDITIONAL TESTS: Reviewed. INTERVAL HPI/OVERNIGHT EVENTS:    OVERNIGHT: No overnight events.  SUBJECTIVE: Patient seen and examined at bedside. Was on BiPAP overnight and transitioned to HFNC at 40 L/min. Was breathing comfortably with no distress. Czech-speaking. Attempt was made to use  (ID [id number]), but patient was not appropriately responding to  prompts. Limited ROS obtained, during which patient stated that she had no CP, SOB, fever/chills.        OBJECTIVE:    VITAL SIGNS:  ICU Vital Signs Last 24 Hrs  T(C): 37 (14 Jan 2020 05:44), Max: 38.9 (13 Jan 2020 13:02)  T(F): 98.6 (14 Jan 2020 05:44), Max: 102 (13 Jan 2020 13:02)  HR: 80 (14 Jan 2020 08:48) (80 - 101)  BP: 126/71 (14 Jan 2020 05:44) (106/53 - 180/90)  BP(mean): --  ABP: --  ABP(mean): --  RR: 17 (14 Jan 2020 08:48) (17 - 24)  SpO2: 95% (14 Jan 2020 08:48) (93% - 99%)        CAPILLARY BLOOD GLUCOSE          PHYSICAL EXAM:  General: NAD, comfortable, on HFNC  HEENT: NCAT, PERRL, clear conjunctiva, no scleral icterus  Neck: supple, no JVD  Respiratory: reduced breath sounds posterior base bilaterally; crackles heard on right lower base  Cardiovascular: RRR, normal S1S2, no M/R/G  Vascular: 2+ radial and DP pulses  Abdomen: soft, NT/ND, bowel sounds in all four quadrants, no palpable masses  Extremities: WWP, no clubbing, cyanosis, or edema  Skin: No rashes present  Neuro: A&Ox1 (person only), NFD, cranial nerves in tact   MSK: no joiny swelling    MEDICATIONS:  MEDICATIONS  (STANDING):  atorvastatin 40 milliGRAM(s) Oral daily  cefTRIAXone   IVPB 1000 milliGRAM(s) IV Intermittent every 24 hours  digoxin     Tablet 0.125 milliGRAM(s) Oral every other day  enoxaparin Injectable 30 milliGRAM(s) SubCutaneous every 24 hours  gabapentin 100 milliGRAM(s) Oral every 12 hours  influenza   Vaccine 0.5 milliLiter(s) IntraMuscular once  ivabradine 5 milliGRAM(s) Oral two times a day  levothyroxine 100 MICROGram(s) Oral daily  oseltamivir 30 milliGRAM(s) Oral every 12 hours  potassium chloride   Solution 10 milliEquivalent(s) Oral once  venlafaxine 75 milliGRAM(s) Oral every 12 hours    MEDICATIONS  (PRN):      ALLERGIES:  Allergies    No Known Allergies    Intolerances        LABS:                        13.0   13.84 )-----------( 128      ( 14 Jan 2020 07:23 )             39.5     01-14    136  |  101  |  39<H>  ----------------------------<  139<H>  3.5   |  17<L>  |  2.38<H>    Ca    7.8<L>      14 Jan 2020 07:23  Phos  5.1     01-14  Mg     1.9     01-14    TPro  6.1  /  Alb  3.2<L>  /  TBili  0.6  /  DBili  x   /  AST  220<H>  /  ALT  100<H>  /  AlkPhos  107  01-14    PT/INR - ( 13 Jan 2020 13:27 )   PT: 13.8 sec;   INR: 1.20          PTT - ( 13 Jan 2020 13:27 )  PTT:37.7 sec  Urinalysis Basic - ( 13 Jan 2020 13:56 )    Color: Yellow / Appearance: Hazy / SG: >=1.030 / pH: x  Gluc: x / Ketone: NEGATIVE  / Bili: Negative / Urobili: 0.2 E.U./dL   Blood: x / Protein: 100 mg/dL / Nitrite: NEGATIVE   Leuk Esterase: NEGATIVE / RBC: < 5 /HPF / WBC < 5 /HPF   Sq Epi: x / Non Sq Epi: Many /HPF / Bacteria: Many /HPF        RADIOLOGY & ADDITIONAL TESTS: Reviewed.

## 2020-01-14 NOTE — PROGRESS NOTE ADULT - PROBLEM SELECTOR PLAN 1
Presented with 3/4 SIRS criteria, febrile 102F (rectal), tachypneic to 22, leukocytosis Wbc 21.03 on admission. Creatinine elevated from baseline of 0.9 with no elevated lacted. Fluids were held because patient was thought to be fluid-overloaded (crackles in the lungs and shortness of breath requiring BIPAP). S/p Solumedrol 120mg IV x1, Zosyn 3.375g IV x1, Vancomycin 1g IV x1. Duoneb x3 in the ED. Trops elevated likely in the setting of demand ischemia now peaked. CXR clear on admission, UA neg, influenza AH3 positive. Found to have new RLL infiltrate on CXR on 01/14. Procalcitonin 83 with new WBC 13.84.   - started on tamiflu 30mg BID (started on 01/13)  - f/u blood cultures, NGTD  - f/u urine cultures, currently gram neg rods, but unclear whether clean catch   - MRSA swab positive   - holding antihypertensives for now in the setting of sepsis, can restart appropriately when needed  - started ceftriaxone 1 g IV daily (01/14). Continue for 5 days.   - No vancomycin or azithromycin for now as patient is clinically improving with improved respiratory status (on HFNC now) and downtrending white count

## 2020-01-14 NOTE — PROVIDER CONTACT NOTE (CRITICAL VALUE NOTIFICATION) - SITUATION
Blood cultures came back   Blood culture collected on January 13, Anerobic bottle. Gram Positive Cocci in clusters

## 2020-01-14 NOTE — PROGRESS NOTE ADULT - PROBLEM SELECTOR PLAN 8
- niece at bedside reports progressive worsening of memory and forgetfulness in the past 2 years with no workup. Patient currently AAOX3. Able to answer questions appropriately and follow commands. 1) PCP Contacted on Admission: (Y/N) --> Name & Phone #:  2) Date of Contact with PCP:  3) PCP Contacted at Discharge: (Y/N)  4) Summary of Handoff Given to PCP:   5) Post-Discharge Appointment Date and Location:

## 2020-01-14 NOTE — PROGRESS NOTE ADULT - PROBLEM SELECTOR PLAN 7
history of hyperlipidemia  - atorvastatin 40mg daily F: none  E:  Replete K<4, Mg< 2  N: DASH diet   Rae in place

## 2020-01-14 NOTE — PROGRESS NOTE ADULT - PROBLEM SELECTOR PLAN 4
presenting with an elevated troponin of 0.07 in the setting of demand ischemia. EKG with no ST changes, LBBB unchanged from prior EKG. Trop peaked at 0.06.   - No need to continue to trend     #?cardiac arrythmia  pt on digoxin 0.125mg every other day and ivabradine 5mg at home with meals. Unsure why. Will continue. Called cardiologist Dr. Jamaal Shaw for more information [phone]  -obtain more collateral    # elevated INR  - INR 1.2 likely in the setting of poor PO intake and vit K def

## 2020-01-14 NOTE — PROGRESS NOTE ADULT - ASSESSMENT
88F PMH HTN, HLD, HFrEF (EF 25% from 02/2019), LBBB, pre-diabetes, RLS presents with shortness of breath for one day admitted for severe sepsis secondary to influenza and possible bacteria in the setting of possible heart failure exacerbation.

## 2020-01-14 NOTE — PROGRESS NOTE ADULT - PROBLEM SELECTOR PLAN 3
presenting with cr 1.62 (baseline 0.9) etiology likely prerenal in the setting of severe sepsis due to flu. This AM creatinine worsened to 2.38  - obtain urine lytes  - avoid nephrotoxic agents  - hold ACE/ARB  - continue to trend CMP daily

## 2020-01-14 NOTE — CONSULT NOTE ADULT - SUBJECTIVE AND OBJECTIVE BOX
Consultation Requested by:    Patient is a 88y old  Female who presents with a chief complaint of sepsis     HPI:  88F PMH HTN, HLD, and "leaky valves" presents with shortness of breath for one day. The niece reports that the patient was in [place] for 3 weeks and got back 4 days ago. the patient started feeling short of breath and progressively got worse over time with her breathing becoming more labored.   Vitals: T 102F (rectal) HR 90 /90 RR 22 O2   Labs: Wbc 21.03   CXR Clear, UA neg,  Flu A was positive. was started on Tamiflu. Blood culture was positive for MRSA and was started on vancomycin.       REVIEW OF SYSTEMS  All review of systems negative, except as mentioned above    Allergies    No Known Allergies    Intolerances      Antimicrobials:  oseltamivir 30 milliGRAM(s) Oral every 12 hours      Other Medications:  atorvastatin 40 milliGRAM(s) Oral daily  digoxin     Tablet 0.125 milliGRAM(s) Oral every other day  enoxaparin Injectable 30 milliGRAM(s) SubCutaneous every 24 hours  gabapentin 100 milliGRAM(s) Oral every 12 hours  guaiFENesin   Syrup  (Sugar-Free) 100 milliGRAM(s) Oral every 6 hours PRN  influenza   Vaccine 0.5 milliLiter(s) IntraMuscular once  ivabradine 5 milliGRAM(s) Oral two times a day  levothyroxine 100 MICROGram(s) Oral daily  sodium chloride 0.9%. 1000 milliLiter(s) IV Continuous <Continuous>  venlafaxine 75 milliGRAM(s) Oral every 12 hours      FAMILY HISTORY:    PAST MEDICAL & SURGICAL HISTORY:  HLD (hyperlipidemia)  Hypertension  No significant past surgical history    SOCIAL HISTORY:    IMMUNIZATIONS  [] Up to Date		[] Not Up to Date:  Recent Immunizations:	[] No	[] Yes:    Daily Height in cm: 157.48 (14 Jan 2020 12:02)    Daily   Head Circumference:  Vital Signs Last 24 Hrs  T(C): 36.9 (14 Jan 2020 12:02), Max: 37.1 (13 Jan 2020 22:10)  T(F): 98.4 (14 Jan 2020 12:02), Max: 98.7 (13 Jan 2020 22:10)  HR: 78 (14 Jan 2020 17:47) (78 - 96)  BP: 145/71 (14 Jan 2020 12:02) (119/67 - 145/71)  BP(mean): --  RR: 17 (14 Jan 2020 17:47) (17 - 21)  SpO2: 98% (14 Jan 2020 17:47) (93% - 98%)    PHYSICAL EXAM  General: NAD, comfortable, on HFNC  HEENT: NCAT, PERRL, clear conjunctiva, no scleral icterus  Neck: supple, no JVD  Respiratory: reduced breath sounds posterior base bilaterally; crackles heard on right lower base  Cardiovascular: RRR, normal S1S2, no M/R/G  Vascular: 2+ radial and DP pulses  Abdomen: soft, NT/ND, bowel sounds in all four quadrants, no palpable masses  Extremities: WWP, no clubbing, cyanosis, or edema  Skin: No rashes present  Neuro: A&Ox1 (person only), NFD, cranial nerves in tact   MSK: no joiny swelling      Respiratory Support:		[] No	[] Yes:  Vasoactive medication infusion:	[] No	[] Yes:  Venous catheters:		[] No	[] Yes:  Bladder catheter:		[] No	[] Yes:  Other catheters or tubes:	[] No	[] Yes:    Lab Results:                        13.0   13.84 )-----------( 128      ( 14 Jan 2020 07:23 )             39.5     01-14    136  |  104  |  44<H>  ----------------------------<  165<H>  4.9   |  17<L>  |  2.68<H>    Ca    7.1<L>      14 Jan 2020 17:20  Phos  3.9     01-14  Mg     2.4     01-14    TPro  6.1  /  Alb  3.2<L>  /  TBili  0.6  /  DBili  x   /  AST  220<H>  /  ALT  100<H>  /  AlkPhos  107  01-14    LIVER FUNCTIONS - ( 14 Jan 2020 07:23 )  Alb: 3.2 g/dL / Pro: 6.1 g/dL / ALK PHOS: 107 U/L / ALT: 100 U/L / AST: 220 U/L / GGT: x           PT/INR - ( 13 Jan 2020 13:27 )   PT: 13.8 sec;   INR: 1.20          PTT - ( 13 Jan 2020 13:27 )  PTT:37.7 sec  Urinalysis Basic - ( 13 Jan 2020 13:56 )    Color: Yellow / Appearance: Hazy / SG: >=1.030 / pH: x  Gluc: x / Ketone: NEGATIVE  / Bili: Negative / Urobili: 0.2 E.U./dL   Blood: x / Protein: 100 mg/dL / Nitrite: NEGATIVE   Leuk Esterase: NEGATIVE / RBC: < 5 /HPF / WBC < 5 /HPF   Sq Epi: x / Non Sq Epi: Many /HPF / Bacteria: Many /HPF        MICROBIOLOGY        [] Case discussed with an attending physician

## 2020-01-14 NOTE — PROGRESS NOTE ADULT - PROBLEM SELECTOR PLAN 2
Previous echo on 02/2019 with EF of 25%. Presented with one day history of worsening shortness of breath, crackles on exam, BNP 40,975, which may possibly be CHF exacerbation secondary to the flu. Held the fluids given that she was volume overloaded requiring BIPAP. ABG pH 7.35 PO2 285. Was given IV lasix 20 mg in ED. Now off BiPAP on HFNC breathing comfortably.   - f/u echo   - in setting of worsening creatinine, will hold diuretics   - restarted home digoxin 0.125mg every other day  - On ivabradine 5mg twice a day with meals at home (cardiologist Dr. Jamaal Shaw [phone])  - holding home dose of coreg from 25mg BID   - holding home isosorbide mononitrate 30mg daily  - holding enalapril 10mg BID  - daily weights   - strict I+Os

## 2020-01-14 NOTE — PROGRESS NOTE ADULT - PROBLEM SELECTOR PLAN 10
1) PCP Contacted on Admission: (Y/N) --> Name & Phone #:  2) Date of Contact with PCP:  3) PCP Contacted at Discharge: (Y/N)  4) Summary of Handoff Given to PCP:   5) Post-Discharge Appointment Date and Location: Resolving, patient initially required BiPapp, weaned to HFNC, now NC  Likely in setting of MRSA PNA and Flu

## 2020-01-14 NOTE — CONSULT NOTE ADULT - ASSESSMENT
88F PMH HTN, HLD, and "leaky valves" presents with shortness of breath for one day. The niece reports that the patient was in [place] for 3 weeks and got back 4 days ago. the patient started feeling short of breath and progressively got worse over time with her breathing becoming more labored.   Vitals: T 102F (rectal) HR 90 /90 RR 22 O2, Labs: Wbc 21.03, CXR Clear, UA neg,  Flu A was positive. was started on Tamiflu. Blood culture was positive for MRSA and was started on vancomycin.     - Please c/w vancomycin based on the trough  - please obtain a TTE to evaluate for endocarditis     Plan discussed with Dr. Marinelli and primary team.

## 2020-01-14 NOTE — PROGRESS NOTE ADULT - PROBLEM SELECTOR PLAN 9
F: none  E:  Replete K<4, Mg< 2  N: DASH diet   Rae in place Starting on Vancomycin, DK ordered, ID consulted

## 2020-01-14 NOTE — PROGRESS NOTE ADULT - PROBLEM SELECTOR PLAN 5
presenting with alkphos 155 and  likely in the setting of sepsis v hepatic congestion. Today, LFTs uptrended to AST//110  - daily LFTs  - if worsening obtain RUQ ultrasound

## 2020-01-14 NOTE — PROGRESS NOTE ADULT - PROBLEM SELECTOR PLAN 6
history of hypertension, currently normotensive  - holding home carvedilol 25mg BID at home  - holding home isosorbide mononitrate 30mg daily   - holding home Enalapril 10mg BID  - patient currently normotensive but can restart antihypertensives appropriately as needed.

## 2020-01-15 LAB
-  AMPICILLIN/SULBACTAM: SIGNIFICANT CHANGE UP
-  AMPICILLIN: SIGNIFICANT CHANGE UP
-  CEFAZOLIN: SIGNIFICANT CHANGE UP
-  CEFTRIAXONE: SIGNIFICANT CHANGE UP
-  CIPROFLOXACIN: SIGNIFICANT CHANGE UP
-  GENTAMICIN: SIGNIFICANT CHANGE UP
-  NITROFURANTOIN: SIGNIFICANT CHANGE UP
-  PIPERACILLIN/TAZOBACTAM: SIGNIFICANT CHANGE UP
-  TOBRAMYCIN: SIGNIFICANT CHANGE UP
-  TRIMETHOPRIM/SULFAMETHOXAZOLE: SIGNIFICANT CHANGE UP
ALBUMIN SERPL ELPH-MCNC: 3.1 G/DL — LOW (ref 3.3–5)
ALBUMIN SERPL ELPH-MCNC: 3.4 G/DL — SIGNIFICANT CHANGE UP (ref 3.3–5)
ALP SERPL-CCNC: 122 U/L — HIGH (ref 40–120)
ALP SERPL-CCNC: 97 U/L — SIGNIFICANT CHANGE UP (ref 40–120)
ALT FLD-CCNC: 249 U/L — HIGH (ref 10–45)
ALT FLD-CCNC: 284 U/L — HIGH (ref 10–45)
ANION GAP SERPL CALC-SCNC: 14 MMOL/L — SIGNIFICANT CHANGE UP (ref 5–17)
AST SERPL-CCNC: 390 U/L — HIGH (ref 10–40)
AST SERPL-CCNC: 454 U/L — HIGH (ref 10–40)
BASOPHILS # BLD AUTO: 0.01 K/UL — SIGNIFICANT CHANGE UP (ref 0–0.2)
BASOPHILS NFR BLD AUTO: 0.1 % — SIGNIFICANT CHANGE UP (ref 0–2)
BILIRUB DIRECT SERPL-MCNC: 0.2 MG/DL — SIGNIFICANT CHANGE UP (ref 0–0.2)
BILIRUB INDIRECT FLD-MCNC: 0.3 MG/DL — SIGNIFICANT CHANGE UP (ref 0.2–1)
BILIRUB SERPL-MCNC: 0.5 MG/DL — SIGNIFICANT CHANGE UP (ref 0.2–1.2)
BILIRUB SERPL-MCNC: 0.5 MG/DL — SIGNIFICANT CHANGE UP (ref 0.2–1.2)
BUN SERPL-MCNC: 47 MG/DL — HIGH (ref 7–23)
CALCIUM SERPL-MCNC: 7 MG/DL — LOW (ref 8.4–10.5)
CHLORIDE SERPL-SCNC: 103 MMOL/L — SIGNIFICANT CHANGE UP (ref 96–108)
CK MB CFR SERPL CALC: 10.3 NG/ML — HIGH (ref 0–6.7)
CK MB CFR SERPL CALC: 10.9 NG/ML — HIGH (ref 0–6.7)
CK SERPL-CCNC: 237 U/L — HIGH (ref 25–170)
CO2 SERPL-SCNC: 17 MMOL/L — LOW (ref 22–31)
CREAT SERPL-MCNC: 2.96 MG/DL — HIGH (ref 0.5–1.3)
CULTURE RESULTS: SIGNIFICANT CHANGE UP
EOSINOPHIL # BLD AUTO: 0 K/UL — SIGNIFICANT CHANGE UP (ref 0–0.5)
EOSINOPHIL NFR BLD AUTO: 0 % — SIGNIFICANT CHANGE UP (ref 0–6)
GAS PNL BLDA: SIGNIFICANT CHANGE UP
GLUCOSE SERPL-MCNC: 102 MG/DL — HIGH (ref 70–99)
HCT VFR BLD CALC: 37.4 % — SIGNIFICANT CHANGE UP (ref 34.5–45)
HCT VFR BLD CALC: 41.1 % — SIGNIFICANT CHANGE UP (ref 34.5–45)
HGB BLD-MCNC: 12.2 G/DL — SIGNIFICANT CHANGE UP (ref 11.5–15.5)
HGB BLD-MCNC: 13.3 G/DL — SIGNIFICANT CHANGE UP (ref 11.5–15.5)
IMM GRANULOCYTES NFR BLD AUTO: 0.6 % — SIGNIFICANT CHANGE UP (ref 0–1.5)
LACTATE SERPL-SCNC: 1.4 MMOL/L — SIGNIFICANT CHANGE UP (ref 0.5–2)
LYMPHOCYTES # BLD AUTO: 0.81 K/UL — LOW (ref 1–3.3)
LYMPHOCYTES # BLD AUTO: 7 % — LOW (ref 13–44)
MAGNESIUM SERPL-MCNC: 2.2 MG/DL — SIGNIFICANT CHANGE UP (ref 1.6–2.6)
MCHC RBC-ENTMCNC: 30 PG — SIGNIFICANT CHANGE UP (ref 27–34)
MCHC RBC-ENTMCNC: 30.1 PG — SIGNIFICANT CHANGE UP (ref 27–34)
MCHC RBC-ENTMCNC: 32.4 GM/DL — SIGNIFICANT CHANGE UP (ref 32–36)
MCHC RBC-ENTMCNC: 32.6 GM/DL — SIGNIFICANT CHANGE UP (ref 32–36)
MCV RBC AUTO: 92.3 FL — SIGNIFICANT CHANGE UP (ref 80–100)
MCV RBC AUTO: 92.6 FL — SIGNIFICANT CHANGE UP (ref 80–100)
METHOD TYPE: SIGNIFICANT CHANGE UP
MONOCYTES # BLD AUTO: 0.88 K/UL — SIGNIFICANT CHANGE UP (ref 0–0.9)
MONOCYTES NFR BLD AUTO: 7.6 % — SIGNIFICANT CHANGE UP (ref 2–14)
NEUTROPHILS # BLD AUTO: 9.74 K/UL — HIGH (ref 1.8–7.4)
NEUTROPHILS NFR BLD AUTO: 84.7 % — HIGH (ref 43–77)
NRBC # BLD: 0 /100 WBCS — SIGNIFICANT CHANGE UP (ref 0–0)
NRBC # BLD: 0 /100 WBCS — SIGNIFICANT CHANGE UP (ref 0–0)
PHOSPHATE SERPL-MCNC: 4 MG/DL — SIGNIFICANT CHANGE UP (ref 2.5–4.5)
PLATELET # BLD AUTO: 138 K/UL — LOW (ref 150–400)
PLATELET # BLD AUTO: 154 K/UL — SIGNIFICANT CHANGE UP (ref 150–400)
POTASSIUM SERPL-MCNC: 3.8 MMOL/L — SIGNIFICANT CHANGE UP (ref 3.5–5.3)
POTASSIUM SERPL-SCNC: 3.8 MMOL/L — SIGNIFICANT CHANGE UP (ref 3.5–5.3)
PROT SERPL-MCNC: 6.2 G/DL — SIGNIFICANT CHANGE UP (ref 6–8.3)
PROT SERPL-MCNC: 6.8 G/DL — SIGNIFICANT CHANGE UP (ref 6–8.3)
RBC # BLD: 4.05 M/UL — SIGNIFICANT CHANGE UP (ref 3.8–5.2)
RBC # BLD: 4.44 M/UL — SIGNIFICANT CHANGE UP (ref 3.8–5.2)
RBC # FLD: 13.4 % — SIGNIFICANT CHANGE UP (ref 10.3–14.5)
RBC # FLD: 13.6 % — SIGNIFICANT CHANGE UP (ref 10.3–14.5)
SODIUM SERPL-SCNC: 134 MMOL/L — LOW (ref 135–145)
SPECIMEN SOURCE: SIGNIFICANT CHANGE UP
TROPONIN T SERPL-MCNC: 0.08 NG/ML — CRITICAL HIGH (ref 0–0.01)
TROPONIN T SERPL-MCNC: 0.09 NG/ML — CRITICAL HIGH (ref 0–0.01)
VANCOMYCIN FLD-MCNC: 25.6 UG/ML
WBC # BLD: 11.51 K/UL — HIGH (ref 3.8–10.5)
WBC # BLD: 13.42 K/UL — HIGH (ref 3.8–10.5)
WBC # FLD AUTO: 11.51 K/UL — HIGH (ref 3.8–10.5)
WBC # FLD AUTO: 13.42 K/UL — HIGH (ref 3.8–10.5)

## 2020-01-15 PROCEDURE — 71045 X-RAY EXAM CHEST 1 VIEW: CPT | Mod: 26,76

## 2020-01-15 PROCEDURE — 93306 TTE W/DOPPLER COMPLETE: CPT | Mod: 26

## 2020-01-15 PROCEDURE — 99223 1ST HOSP IP/OBS HIGH 75: CPT

## 2020-01-15 PROCEDURE — 99222 1ST HOSP IP/OBS MODERATE 55: CPT

## 2020-01-15 PROCEDURE — 76770 US EXAM ABDO BACK WALL COMP: CPT | Mod: 26

## 2020-01-15 PROCEDURE — 99233 SBSQ HOSP IP/OBS HIGH 50: CPT | Mod: GC

## 2020-01-15 PROCEDURE — 93010 ELECTROCARDIOGRAM REPORT: CPT

## 2020-01-15 RX ORDER — IPRATROPIUM/ALBUTEROL SULFATE 18-103MCG
3 AEROSOL WITH ADAPTER (GRAM) INHALATION EVERY 4 HOURS
Refills: 0 | Status: DISCONTINUED | OUTPATIENT
Start: 2020-01-15 | End: 2020-01-22

## 2020-01-15 RX ORDER — IPRATROPIUM/ALBUTEROL SULFATE 18-103MCG
3 AEROSOL WITH ADAPTER (GRAM) INHALATION ONCE
Refills: 0 | Status: COMPLETED | OUTPATIENT
Start: 2020-01-15 | End: 2020-01-15

## 2020-01-15 RX ADMIN — ENOXAPARIN SODIUM 30 MILLIGRAM(S): 100 INJECTION SUBCUTANEOUS at 19:44

## 2020-01-15 RX ADMIN — IVABRADINE 5 MILLIGRAM(S): 7.5 TABLET, FILM COATED ORAL at 10:51

## 2020-01-15 RX ADMIN — Medication 30 MILLIGRAM(S): at 21:50

## 2020-01-15 RX ADMIN — Medication 3 MILLILITER(S): at 10:51

## 2020-01-15 RX ADMIN — Medication 100 MICROGRAM(S): at 05:39

## 2020-01-15 RX ADMIN — ATORVASTATIN CALCIUM 40 MILLIGRAM(S): 80 TABLET, FILM COATED ORAL at 14:31

## 2020-01-15 RX ADMIN — Medication 3 MILLILITER(S): at 19:44

## 2020-01-15 RX ADMIN — Medication 3 MILLILITER(S): at 23:13

## 2020-01-15 RX ADMIN — Medication 75 MILLIGRAM(S): at 05:39

## 2020-01-15 RX ADMIN — Medication 30 MILLIGRAM(S): at 06:30

## 2020-01-15 RX ADMIN — SODIUM CHLORIDE 70 MILLILITER(S): 9 INJECTION INTRAMUSCULAR; INTRAVENOUS; SUBCUTANEOUS at 14:31

## 2020-01-15 RX ADMIN — GABAPENTIN 100 MILLIGRAM(S): 400 CAPSULE ORAL at 05:39

## 2020-01-15 RX ADMIN — Medication 0.12 MILLIGRAM(S): at 14:32

## 2020-01-15 RX ADMIN — Medication 75 MILLIGRAM(S): at 21:50

## 2020-01-15 RX ADMIN — GABAPENTIN 100 MILLIGRAM(S): 400 CAPSULE ORAL at 21:50

## 2020-01-15 RX ADMIN — Medication 3 MILLILITER(S): at 00:18

## 2020-01-15 RX ADMIN — IVABRADINE 5 MILLIGRAM(S): 7.5 TABLET, FILM COATED ORAL at 21:50

## 2020-01-15 NOTE — PROGRESS NOTE ADULT - PROBLEM SELECTOR PLAN 3
presenting with cr 1.62 (baseline 0.9) etiology likely prerenal in the setting of severe sepsis due to flu. This AM creatinine worsened to 2.96. FENa 0.6 percent.   - obtain urine lytes  - avoid nephrotoxic agents  - hold ACE/ARB  - continue to trend CMP daily    #R/o ATN. Presented with NICOLÁS, but UOP   - repeat urine lytes  - UA with microscopic analysis for casts   - continue to closely monitor UOP

## 2020-01-15 NOTE — PROGRESS NOTE ADULT - ASSESSMENT
88F PMH HTN, HLD, HFrEF (EF 25% from 02/2019), LBBB, pre-diabetes, RLS presents with shortness of breath for one day admitted for severe sepsis secondary to influenza and found to have MRSA bacteremia. Stay complicated with possible ATN.

## 2020-01-15 NOTE — CONSULT NOTE ADULT - ASSESSMENT
per Internal Medicine    88F PMH HTN, HLD, HFrEF (EF 25% from 02/2019), LBBB, pre-diabetes, RLS presents with shortness of breath for one day admitted for severe sepsis secondary to influenza and possible bacteria in the setting of possible heart failure exacerbation.     Problem/Plan - 1:  ·  Problem: Severe sepsis.  Plan: Presented with 3/4 SIRS criteria, febrile 102F (rectal), tachypneic to 22, leukocytosis Wbc 21.03 on admission. Creatinine elevated from baseline of 0.9 with no elevated lacted. Fluids were held because patient was thought to be fluid-overloaded (crackles in the lungs and shortness of breath requiring BIPAP). S/p Solumedrol 120mg IV x1, Zosyn 3.375g IV x1, Vancomycin 1g IV x1. Duoneb x3 in the ED. Trops elevated likely in the setting of demand ischemia now peaked. CXR clear on admission, UA neg, influenza AH3 positive. Found to have new RLL infiltrate on CXR on 01/14. Procalcitonin 83 with new WBC 13.84.   - started on tamiflu 30mg BID (started on 01/13)  - f/u blood cultures, NGTD  - f/u urine cultures, currently gram neg rods, but unclear whether clean catch   - MRSA swab positive   - holding antihypertensives for now in the setting of sepsis, can restart appropriately when needed  - started ceftriaxone 1 g IV daily (01/14). Continue for 5 days.   - No vancomycin or azithromycin for now as patient is clinically improving with improved respiratory status (on HFNC now) and downtrending white count.     Problem/Plan - 2:  ·  Problem: R/O CHF exacerbation.  Plan: Previous echo on 02/2019 with EF of 25%. Presented with one day history of worsening shortness of breath, crackles on exam, BNP 40,975, which may possibly be CHF exacerbation secondary to the flu. Held the fluids given that she was volume overloaded requiring BIPAP. ABG pH 7.35 PO2 285. Was given IV lasix 20 mg in ED. Now off BiPAP on HFNC breathing comfortably.   - f/u echo   - in setting of worsening creatinine, will hold diuretics   - restarted home digoxin 0.125mg every other day  - On ivabradine 5mg twice a day with meals at home (cardiologist Dr. Jamaal Shaw [phone])  - holding home dose of coreg from 25mg BID   - holding home isosorbide mononitrate 30mg daily  - holding enalapril 10mg BID  - daily weights   - strict I+Os.     Problem/Plan - 3:  ·  Problem: NICOLÁS (acute kidney injury).  Plan: presenting with cr 1.62 (baseline 0.9) etiology likely prerenal in the setting of severe sepsis due to flu. This AM creatinine worsened to 2.38  - obtain urine lytes  - avoid nephrotoxic agents  - hold ACE/ARB  - continue to trend CMP daily.     Problem/Plan - 4:  ·  Problem: Elevated troponin.  Plan: presenting with an elevated troponin of 0.07 in the setting of demand ischemia. EKG with no ST changes, LBBB unchanged from prior EKG. Trop peaked at 0.06.   - No need to continue to trend     #?cardiac arrythmia  pt on digoxin 0.125mg every other day and ivabradine 5mg at home with meals. Unsure why. Will continue. Called cardiologist Dr. Jamaal Shaw for more information [phone]  -obtain more collateral    # elevated INR  - INR 1.2 likely in the setting of poor PO intake and vit K def.     Problem/Plan - 5:  ·  Problem: Transaminitis.  Plan: presenting with alkphos 155 and  likely in the setting of sepsis v hepatic congestion. Today, LFTs uptrended to AST//110  - daily LFTs  - if worsening obtain RUQ ultrasound.     Problem/Plan - 6:  Problem: Hypertension. Plan: history of hypertension, currently normotensive  - holding home carvedilol 25mg BID at home  - holding home isosorbide mononitrate 30mg daily   - holding home Enalapril 10mg BID  - patient currently normotensive but can restart antihypertensives appropriately as needed.    Problem/Plan - 7:  ·  Problem: Prophylactic measure. Plan: F: none  E:  Replete K<4, Mg< 2  N: DASH diet   Rae in place.

## 2020-01-15 NOTE — PROGRESS NOTE ADULT - PROBLEM SELECTOR PLAN 2
Previous echo on 02/2019 with EF of 25%. Presented with one day history of worsening shortness of breath, crackles on exam, BNP 40,975, which may possibly be CHF exacerbation secondary to the flu. Held the fluids given that she was volume overloaded requiring BIPAP. ABG pH 7.35 PO2 285. Was given IV lasix 20 mg in ED. Now off BiPAP on HFNC breathing comfortably.   - f/u echo   - in setting of worsening creatinine, will hold diuretics   - restarted home digoxin 0.125mg every other day  - On ivabradine 5mg twice a day with meals at home (cardiologist Dr. Jamaal Shaw [phone])  - holding home dose of coreg from 25mg BID   - holding home isosorbide mononitrate 30mg daily  - holding enalapril 10mg BID  - daily weights   - strict I+Os

## 2020-01-15 NOTE — CONSULT NOTE ADULT - ASSESSMENT
89 yo F PMHx HTN, HLD, and "leaky valves" admitted with sepsis due to influenza, MRSA bacteremia, UTI.   Nephrology consulted for nonoliguric NICOLÁS.      # Nonoliguric NICOLÁS   - baseline Cr 0.9 in 9/2019, 1.62 on admission, up to 2.96 today  - likely due to sepsis-related hypoperfusion, hypoxia, poor oral intake/ dehydration, unclear use of NSAIDs  - FeNa cw pre-renal etiology, Parvin <20  - maintain adequate hydration w 0.9NaCl at 70 cc/hr   - monitor BUN/Cr, lytes, uop  - send for renal sono  - avoid ACE/ARB/NSAIDs/PPIs  - renal diet    # Infection   - MRSA bacteremia, PNA, Influenza, E coli UTI  - follow ID recommendations   - follow DK   - check Vanco level stat   - renal dosing of antibiotics      Discussed with attending Dr Suresh.

## 2020-01-15 NOTE — CHART NOTE - NSCHARTNOTEFT_GEN_A_CORE
Called to bedside by nurse at around midnight. Pt complaining of difficulty breathing and chest pressure.  used but hard to understand pt b/c pt was having difficulty getting out words. Vitals: Rectal temp of 100.1, /78, RR 17, HR 93, O2 sat 97% HFNC 6L. On exam: slight crackles at the base of the lungs, with diffuse wheezing and rhonchi. Attempted to put pt on bipap however she was refusing and taking off the mask. Pt given a duoneb tx. EKG grossly unchanged from prior, Labs drawn: cardiac enzymes, cbc, lactate 1.4, ABG: pH 7.32, CO2 36, HCO3 18 compared to prior ABG from 1/13: pH 7.35, CO2 34, HCO3 18. Concern for possible aspiration - CXR was ordered showing improvement of RLL opacity, no effusions seen. Pt reported to improve after duoneb treatment, no longer complaining of chest pain. Called to bedside by nurse at around midnight. Pt complaining of difficulty breathing and chest pain.  used but hard to understand pt b/c pt was having difficulty getting out words. Kept pointing to her chest. Vitals: Rectal temp of 100.1, /78, RR 17, HR 93, O2 sat 97% HFNC 40L/min FIO2 40. On exam: slight crackles at the base of the lungs, with diffuse wheezing and rhonchi. Attempted to put pt on bipap however she was refusing and taking off the mask. Pt given a duoneb tx. EKG grossly unchanged from prior - NSR with rate of 80, LBBB, LVH. Labs drawn: cardiac enzymes elevated with trop of 0.09, cbc, lactate 1.4, ABG: pH 7.32, CO2 36, HCO3 18 compared to prior ABG from 1/13: pH 7.35, CO2 34, HCO3 18. Likely type II ischemia in setting of wheezing, cough, sepsis, ckd. Concern for possible aspiration - CXR was ordered showing improvement of RLL opacity, no effusions seen. Pt reported to improve after duoneb treatment, no longer complaining of chest pain or difficulty breathing. Cardiology made aware of pt. Called to bedside by nurse at around midnight. Pt complaining of difficulty breathing and chest pain.  used but hard to understand pt b/c pt was having difficulty getting out words. Kept pointing to her chest. Vitals: Rectal temp of 100.2, /73, RR 17, HR 93, O2 sat 97% HFNC 40L/min FIO2 40. On exam: slight crackles at the base of the lungs, with diffuse wheezing and rhonchi. Attempted to put pt on bipap however she was refusing and taking off the mask. Pt given a duoneb tx. EKG grossly unchanged from prior - NSR with rate of 80, LBBB, LVH, possible changes in V5 nad V6. Labs drawn: cardiac enzymes elevated with trop of 0.09, cbc, lactate 1.4, ABG: pH 7.32, CO2 36, HCO3 18 compared to prior ABG from 1/13: pH 7.35, CO2 34, HCO3 18. Likely type II ischemia in setting of sepsis, worsening renal function. CXR was ordered showing improvement of RLL opacity, no effusions seen. Pt reported to improve after duoneb treatment, no longer complaining of chest pain or difficulty breathing. Cardiology made aware of pt.    #Type 2 demand ischemia  - likely in setting of sepsis, worsening renal function  - trop 0.09, trend trop  - f/u ekg  - f/u echo Called to bedside by nurse at around midnight. Pt complaining of difficulty breathing and chest pain.  used but hard to understand pt b/c pt was having difficulty getting out words. Kept pointing to her chest. Vitals: Rectal temp of 100.2, /73, RR 17, HR 93, O2 sat 97% HFNC 40L/min FIO2 40. On exam: slight crackles at the base of the lungs, with diffuse wheezing and rhonchi. Attempted to put pt on bipap however she was refusing and taking off the mask. Pt given a duoneb tx. EKG showing NSR with rate of 80, LBBB, LVH, with new ST depression in V5 and V6 from prior EKG. Labs drawn: cardiac enzymes elevated with trop of 0.09, cbc, lactate 1.4, ABG: pH 7.32, CO2 36, HCO3 18 compared to prior ABG from 1/13: pH 7.35, CO2 34, HCO3 18. Likely type II ischemia in setting of sepsis, worsening renal function. CXR was ordered showing improvement of RLL opacity, no effusions seen. Pt reported to improve after duoneb treatment, no longer complaining of chest pain or difficulty breathing. Cardiology made aware of pt.    #Type 2 demand ischemia  - likely in setting of sepsis bacteremia, worsening renal function  - trop 0.09, trend trops  - trend ekg, new changes with ST depressions in V5 and V6  - f/u echo today

## 2020-01-15 NOTE — CONSULT NOTE ADULT - ATTENDING COMMENTS
Clinical picture, CXR, vitals, labs all more consistent with hypoxia 2/2 flu/pna rather than pulmonary edema/volume overload. Agree w stopping diuresis and giving trial of IVF. Suggest echocardiogram and CT non contrast of chest.  R/O urinary retention as cause of NICOLÁS with bedside bladder scan POST voiding.   Her Cr already was elevated on admission and has been steadily rising, along with elevated LFTs that are now downtrending, suspicious for ischemic injury in the setting of inflammation/sepsis resulting in ATN. Rhabdo from flu also possibly occurred as outpatient but less likely given low cpks here.   Supportive care, check Vanc level and dose by level, strict I's and O's

## 2020-01-15 NOTE — PROGRESS NOTE ADULT - PROBLEM SELECTOR PLAN 10
Resolving, patient initially required BiPapp, weaned to HFNC, now NC  Likely in setting of MRSA PNA and Flu

## 2020-01-15 NOTE — PROGRESS NOTE ADULT - PROBLEM SELECTOR PLAN 4
presenting with an elevated troponin of 0.07 in the setting of demand ischemia. EKG with no ST changes, LBBB unchanged from prior EKG. Trop peaked at 0.06. Had CP and SOB on 01/15 with elevated trop to 0.09 that peaked.   - No need to continue to trend     #?cardiac arrythmia  pt on digoxin 0.125mg every other day and ivabradine 5mg at home with meals. Unsure why. Will continue. Called cardiologist Dr. Jamaal Shaw for more information [phone]  -obtain more collateral    #elevated INR  - INR 1.2 likely in the setting of poor PO intake and vit K def

## 2020-01-15 NOTE — PROGRESS NOTE ADULT - PROBLEM SELECTOR PLAN 5
presenting with alkphos 155 and  likely in the setting of sepsis v hepatic congestion. Today, LFTs uptrended to AST//249. Most likely due to sepsis.   - daily LFTs

## 2020-01-15 NOTE — CONSULT NOTE ADULT - SUBJECTIVE AND OBJECTIVE BOX
Patient is a 88y old  Female who presents with a chief complaint of sepsis (15 Rios 2020 10:41)    Nephrology consulted for NICOLÁS.  Baseline Cr 0.9 in 9/2019, 1.62 on admission, up to 2.96 today.  Nonoliguric, Rae in place.  No recent contrast exposure, ??? NSAIDs use.  Admitted with sepsis due to influenza, MRSA bacteremia, PNA, started on Tamiflu, Vancomycin, Zosyn.  Labs noted, WBC 11.5, Hb 12.2, Plt 138, AST//249, , CO2 17, Cac 7.7.  UA (+) E Coli.  CXR: R infiltrate.  Rending Echo.      HPI:  87 yo F PMHx HTN, HLD, and "leaky valves" presents with shortness of breath for one day. Most of the history is provided by patient's niece Pebbles Kan (HCP) at bedside. The niece reports that the patient was in [place] for 3 weeks and got back 4 days ago. During the time the patient was away, she hasn't been taking her medications compliantly because of her forgetfullness. She resumed her medications yesterday. Yesterday morning the patient started feeling short of breath and progressively got worse over time with her breathing becoming more labored. Her niece noticed that by the end of the day she was gasping for air and decided to bring her into the emergency room. The niece says that 4 days ago when she picked her up from the airport, the patient forgot her coat on the plane and was walking in the cold to her car without her coat. Otherwise, she denies any fevers, chills, night sweats, chest pain, abdominal pain, n/v/d/c, sick contacts. She lives with a friend at home and ambulates using a walker.     ED Course:  Vitals: T 102F (rectal) HR 90 /90 RR 22 O2 98% (nonrebreather)  Labs: Wbc 21.03 Hb 14.0 Plt 198 CO2 19 BUN/Cr 22/1.62 Trop 0.07 BNP 40,975 Alkphos 155  ALT 28   CXR Clear, UA neg, EKG LBBB unchanged from prior  Given Tylenol 650mg, Solumedrol 120mg IV x1, Zosyn 3.375g IV x1, Vancomycin 1g IV x1, Duoneb x3, placed on BIPAP (13 Jan 2020 16:04)      PAST MEDICAL & SURGICAL HISTORY:  HLD (hyperlipidemia)  Hypertension  No significant past surgical history      Allergies    No Known Allergies    Intolerances        FAMILY HISTORY:  not able to obtain     SOCIAL HISTORY:  former smoker     MEDICATIONS  (STANDING):  albuterol/ipratropium for Nebulization 3 milliLiter(s) Nebulizer every 4 hours  atorvastatin 40 milliGRAM(s) Oral daily  digoxin     Tablet 0.125 milliGRAM(s) Oral every other day  enoxaparin Injectable 30 milliGRAM(s) SubCutaneous every 24 hours  gabapentin 100 milliGRAM(s) Oral every 12 hours  influenza   Vaccine 0.5 milliLiter(s) IntraMuscular once  ivabradine 5 milliGRAM(s) Oral two times a day  levothyroxine 100 MICROGram(s) Oral daily  oseltamivir 30 milliGRAM(s) Oral every 12 hours  sodium chloride 0.9%. 500 milliLiter(s) (70 mL/Hr) IV Continuous <Continuous>  venlafaxine 75 milliGRAM(s) Oral every 12 hours    MEDICATIONS  (PRN):  guaiFENesin   Syrup  (Sugar-Free) 100 milliGRAM(s) Oral every 6 hours PRN Cough      Vital Signs Last 24 Hrs  T(C): 36.8 (15 Rios 2020 05:45), Max: 37.9 (15 Rios 2020 00:30)  T(F): 98.2 (15 Rios 2020 05:45), Max: 100.2 (15 Rios 2020 00:30)  HR: 64 (15 Rios 2020 05:45) (62 - 100)  BP: 119/64 (15 Rios 2020 05:45) (119/64 - 159/73)  BP(mean): --  RR: 18 (15 Rios 2020 05:45) (16 - 18)  SpO2: 98% (15 Rios 2020 05:45) (96% - 100%)      REVIEW OF SYSTEMS:  Gen: No fever  CVS: No chest pain  Resp: No shortness of breath  Abd: No nausea/ No vomiting/No abdominal pain  CNS: No headache      PHYSICAL EXAM:  GENERAL: alert, no acute distress at present  NECK: supple, No JVD  CHEST/LUNG: diffuse expiratory wheezes   HEART: normal S1S2, RRR  ABDOMEN: Soft, Nontender, +BS, No flank tenderness  EXTREMITIES: No clubbing, cyanosis, or edema, no calf tenderness bilateral  Neurology: oriented to person, no focal neurological deficit  SKIN: No rashes      CAPILLARY BLOOD GLUCOSE          I&O's Summary    14 Jan 2020 07:01  -  15 Rios 2020 07:00  --------------------------------------------------------  IN: 765 mL / OUT: 650 mL / NET: 115 mL          LABS:                            12.2   11.51 )-----------( 138      ( 15 Rios 2020 05:33 )             37.4       PT/INR - ( 13 Jan 2020 13:27 )   PT: 13.8 sec;   INR: 1.20          PTT - ( 13 Jan 2020 13:27 )  PTT:37.7 sec  CARDIAC MARKERS ( 15 Rios 2020 05:33 )  x     / 0.08 ng/mL / 237 U/L / x     / 10.9 ng/mL  CARDIAC MARKERS ( 15 Rios 2020 01:49 )  x     / 0.09 ng/mL / 241 U/L / x     / 10.3 ng/mL  CARDIAC MARKERS ( 13 Jan 2020 23:01 )  x     / 0.06 ng/mL / x     / x     / x      CARDIAC MARKERS ( 13 Jan 2020 13:27 )  x     / 0.07 ng/mL / x     / x     / x          Urinalysis Basic - ( 13 Jan 2020 13:56 )    Color: Yellow / Appearance: Hazy / SG: >=1.030 / pH: x  Gluc: x / Ketone: NEGATIVE  / Bili: Negative / Urobili: 0.2 E.U./dL   Blood: x / Protein: 100 mg/dL / Nitrite: NEGATIVE   Leuk Esterase: NEGATIVE / RBC: < 5 /HPF / WBC < 5 /HPF   Sq Epi: x / Non Sq Epi: Many /HPF / Bacteria: Many /HPF        RADIOLOGY & ADDITIONAL TESTS:    < from: Xray Chest 1 View- PORTABLE-Urgent (01.15.20 @ 06:55) >    EXAM:  XR CHEST PORTABLE URGENT 1V                          PROCEDURE DATE:  01/15/2020          INTERPRETATION:  Clinical history/reason for exam: Shortness of breath.    Single frontal view.    Comparison: January 15, 2020. time 1:11 AM.    Findings/  impression: Right lung pathology and cardiomegaly, stable. Stable bony structures.      < end of copied text >

## 2020-01-15 NOTE — PROVIDER CONTACT NOTE (CRITICAL VALUE NOTIFICATION) - BACKGROUND
Flu A was positive. was started on Tamiflu. Blood culture was positive for MRSA and was started on vancomycin.

## 2020-01-15 NOTE — PROGRESS NOTE ADULT - SUBJECTIVE AND OBJECTIVE BOX
INTERVAL HPI/OVERNIGHT EVENTS:    OVERNIGHT: No overnight events.  SUBJECTIVE: Patient seen and examined at bedside. no active complaints except for some mild shortness of breath.      REVIEW OF SYSTEMS:    All other review of systems is negative unless indicated above.      Allergies    No Known Allergies    Intolerances        ANTIBIOTICS/RELEVANT:  antimicrobials  oseltamivir 30 milliGRAM(s) Oral every 12 hours    immunologic:  influenza   Vaccine 0.5 milliLiter(s) IntraMuscular once    OTHER:  albuterol/ipratropium for Nebulization 3 milliLiter(s) Nebulizer every 4 hours  atorvastatin 40 milliGRAM(s) Oral daily  digoxin     Tablet 0.125 milliGRAM(s) Oral every other day  enoxaparin Injectable 30 milliGRAM(s) SubCutaneous every 24 hours  gabapentin 100 milliGRAM(s) Oral every 12 hours  guaiFENesin   Syrup  (Sugar-Free) 100 milliGRAM(s) Oral every 6 hours PRN  ivabradine 5 milliGRAM(s) Oral two times a day  levothyroxine 100 MICROGram(s) Oral daily  sodium chloride 0.9%. 500 milliLiter(s) IV Continuous <Continuous>  venlafaxine 75 milliGRAM(s) Oral every 12 hours      Objective:  Vital Signs Last 24 Hrs  T(C): 36.8 (15 Rios 2020 05:45), Max: 37.9 (15 Rios 2020 00:30)  T(F): 98.2 (15 Rios 2020 05:45), Max: 100.2 (15 Rios 2020 00:30)  HR: 64 (15 Rios 2020 05:45) (62 - 100)  BP: 119/64 (15 Rios 2020 05:45) (119/64 - 159/73)  BP(mean): --  RR: 18 (15 Rios 2020 05:45) (16 - 18)  SpO2: 98% (15 Rios 2020 05:45) (96% - 100%)      PHYSICAL EXAM:    General: NAD, comfortable while breathing on nasal cannula  HEENT: NCAT, PERRL, clear conjunctiva, no scleral icterus  Neck: supple, no JVD  Respiratory: rhonchi heard in mid and lower lung fields bilaterally, some diffuse wheeze  Cardiovascular: RRR, normal S1S2, no M/R/G  Vascular: 2+ radial and DP pulses  Abdomen: soft, NT/ND, bowel sounds in all four quadrants, no palpable masses  Extremities: WWP, no clubbing, cyanosis, or edema  Skin: No rashes present  Neuro: A&Ox1 (person only), NFD, cranial nerves in tact   MSK: no joint swelling        LABS:                        12.2   11.51 )-----------( 138      ( 15 Rios 2020 05:33 )             37.4     01-15    134<L>  |  103  |  47<H>  ----------------------------<  102<H>  3.8   |  17<L>  |  2.96<H>    Ca    7.0<L>      15 Rios 2020 05:33  Phos  4.0     01-15  Mg     2.2     01-15    TPro  6.2  /  Alb  3.1<L>  /  TBili  0.5  /  DBili  x   /  AST  390<H>  /  ALT  249<H>  /  AlkPhos  97  01-15          MICROBIOLOGY:  Culture Results:   No growth at 12 hours (01-14 @ 17:29)            RECENT CULTURES:  01-14 @ 17:29  .Blood Blood  --  --  --    No growth at 12 hours  --  01-13 @ 15:06  .Blood Blood-Peripheral  Blood Culture PCR  Blood Culture PCR  PCR    Growth in aerobic and anaerobic bottles: Staphylococcus aureus  presumptive Methicillin resistant  Confirmation to follow within 24 hours  Floor previously notified.  Susceptibility to follow.  --  01-13 @ 14:47  .Urine Clean Catch (Midstream)  Escherichia coli  Escherichia coli  LATHA    >100,000 CFU/ml Escherichia coli  --      RADIOLOGY & ADDITIONAL STUDIES:

## 2020-01-15 NOTE — PROGRESS NOTE ADULT - ASSESSMENT
88F PMH HTN, HLD, and "leaky valves" presents with shortness of breath for one day. The niece reports that the patient was in [place] for 3 weeks and got back 4 days ago. the patient started feeling short of breath and progressively got worse over time with her breathing becoming more labored.   Vitals: T 102F (rectal) HR 90 /90 RR 22 O2, Labs: Wbc 21.03, CXR Clear, UA neg,  Flu A was positive. was started on Tamiflu. Blood culture was positive for MRSA and was started on vancomycin.     - Please c/w vancomycin based on the trough  - please obtain a DK     Plan discussed with Dr. Marinelli and primary team.

## 2020-01-15 NOTE — CONSULT NOTE ADULT - SUBJECTIVE AND OBJECTIVE BOX
HISTORY OF PRESENT ILLNESS:  HPI:  88F PMH HTN, HLD, and "leaky valves" presents with shortness of breath for one day. Most of the history is provided by patient's niece Pebbles Kan (HCP) at bedside. The niece reports that the patient was in [place] for 3 weeks and got back 4 days ago. During the time the patient was away, she hasn't been taking her medications compliantly because of her forgetfullness. She resumed her medications yesterday. Yesterday morning the patient started feeling short of breath and progressively got worse over time with her breathing becoming more labored. Her niece noticed that by the end of the day she was gasping for air and decided to bring her into the emergency room. The niece says that 4 days ago when she picked her up from the airport, the patient forgot her coat on the plane and was walking in the cold to her car without her coat. Otherwise, she denies any fevers, chills, night sweats, chest pain, abdominal pain, n/v/d/c, sick contacts. She lives with a friend at home and ambulates using a walker.     ED Course:  Vitals: T 102F (rectal) HR 90 /90 RR 22 O2 98% (nonrebreather)  Labs: Wbc 21.03 Hb 14.0 Plt 198 CO2 19 BUN/Cr 22/1.62 Trop 0.07 BNP 40,975 Alkphos 155  ALT 28   CXR Clear, UA neg, EKG LBBB unchanged from prior  Given Tylenol 650mg, Solumedrol 120mg IV x1, Zosyn 3.375g IV x1, Vancomycin 1g IV x1, Duoneb x3, placed on BIPAP (13 Jan 2020 16:04)      Interval HPI - no cp sob palp, + dizziness, rest as per above hpi    PAST MEDICAL & SURGICAL HISTORY:  HLD (hyperlipidemia)  Hypertension  No significant past surgical history        [ ] Diabetes   [ ] Hypertension  [ ] Hyperlipidemia  [ ] CAD  [ ] PCI  [ ] CABG    PREVIOUS DIAGNOSTIC TESTING:    [ ] Echocardiogram:  [ ]  Catheterization:  [ ] Stress Test:  	    MEDICATIONS:  digoxin     Tablet 0.125 milliGRAM(s) Oral every other day  ivabradine 5 milliGRAM(s) Oral two times a day    oseltamivir 30 milliGRAM(s) Oral every 12 hours    albuterol/ipratropium for Nebulization 3 milliLiter(s) Nebulizer every 4 hours  guaiFENesin   Syrup  (Sugar-Free) 100 milliGRAM(s) Oral every 6 hours PRN    gabapentin 100 milliGRAM(s) Oral every 12 hours  venlafaxine 75 milliGRAM(s) Oral every 12 hours      atorvastatin 40 milliGRAM(s) Oral daily  levothyroxine 100 MICROGram(s) Oral daily    enoxaparin Injectable 30 milliGRAM(s) SubCutaneous every 24 hours  influenza   Vaccine 0.5 milliLiter(s) IntraMuscular once  sodium chloride 0.9%. 500 milliLiter(s) IV Continuous <Continuous>          SOCIAL HISTORY:    [ x] Non-smoker  [ ] Smoker  [ ] Alcohol    FAMILY HX: FAMILY HISTORY:    no cad in first degree relatives    Allergies    No Known Allergies    Intolerances    	    REVIEW OF SYSTEMS:    [x] as per HPI  CONSTITUTIONAL: No fever, weight loss, or fatigue  ENT:  No difficulty hearing, tinnitus, vertigo; No sinus or throat pain  RESPIRATORY: No cough, wheezing, chills or hemoptysis; No Shortness of Breath  CARDIOVASCULAR: No chest pain, palpitations, dizziness, or leg swelling  GASTROINTESTINAL: No abdominal or epigastric pain. No nausea, vomiting, or hematemesis; No diarrhea or constipation. No melena or hematochezia.  GENITOURINARY: No dysuria, frequency, hematuria, or incontinence  NEUROLOGICAL: No headaches, memory loss, loss of strength, numbness, or tremors  MUSCULOSKELETAL: No joint pain or swelling; No muscle, back, or extremity pain  [x] All others negative	  [ ] Unable to obtain    PHYSICAL EXAM:  T(C): 36.8 (01-15-20 @ 05:45), Max: 37.9 (01-15-20 @ 00:30)  HR: 64 (01-15-20 @ 05:45) (62 - 100)  BP: 119/64 (01-15-20 @ 05:45) (119/64 - 159/73)  RR: 18 (01-15-20 @ 05:45) (16 - 18)  SpO2: 98% (01-15-20 @ 05:45) (96% - 100%)  Wt(kg): --  I&O's Summary    14 Jan 2020 07:01  -  15 Rios 2020 07:00  --------------------------------------------------------  IN: 765 mL / OUT: 650 mL / NET: 115 mL        Appearance: Normal	  HEENT:   Normal oral mucosa, PERRL, EOMI	  Lymphatic: No lymphadenopathy  Cardiovascular: Normal S1 S2, No JVD, No murmurs, No edema  Respiratory: Lungs clear to auscultation	  Psychiatry: A & O x 3, Mood & affect appropriate  Gastrointestinal:  Soft, Non-tender, + BS	  Skin: No rashes, No ecchymoses, No cyanosis	  Neurologic: Non-focal  Extremities: Normal range of motion, No clubbing, cyanosis or edema  Vascular: Peripheral pulses palpable 2+ bilaterally    TELEMETRY: 	    ECG:  < from: 12 Lead ECG (01.15.20 @ 00:25) >  Ventricular Rate 83 BPM    Atrial Rate 83 BPM    P-R Interval 200 ms    QRS Duration 172 ms    Q-T Interval 416 ms    QTC Calculation(Bezet) 488 ms    P Axis 73 degrees    R Axis -59 degrees    T Axis 106 degrees    Diagnosis Line Sinus rhythm with premature atrial complexes  Possible Left atrial enlargement  Left axis deviation  Left bundle branch block    < end of copied text >  < from: 12 Lead ECG (01.13.20 @ 12:57) >  Ventricular Rate 102 BPM    Atrial Rate 102 BPM    P-R Interval 186 ms    QRS Duration 174 ms    Q-T Interval 382 ms    QTC Calculation(Bezet) 497 ms    P Axis 69 degrees    R Axis -78 degrees    T Axis 89 degrees    Diagnosis Line Sinus tachycardia with occasional premature ventricular complexes  Intra-ventricular conduction defect (unspecified)  Confirmed by ABHISHEK APPIAH NEIL ([id number]) on 1/14/2020 11:14:05 AM    < end of copied text >    ECHO:  STRESS:  CATH:  	  RADIOLOGY:  CXR:  CT:  US:   	  	  LABS:	 	    CARDIAC MARKERS:                                  12.2   11.51 )-----------( 138      ( 15 Rios 2020 05:33 )             37.4     01-15    134<L>  |  103  |  47<H>  ----------------------------<  102<H>  3.8   |  17<L>  |  2.96<H>    Ca    7.0<L>      15 Rios 2020 05:33  Phos  4.0     01-15  Mg     2.2     01-15    TPro  6.2  /  Alb  3.1<L>  /  TBili  0.5  /  DBili  x   /  AST  390<H>  /  ALT  249<H>  /  AlkPhos  97  01-15    proBNP:   Lipid Profile:   HgA1c:   TSH:     ASSESSMENT/PLAN: 	    1. Abnormal ECG - pt has inherent LBBB with QRS>150ms, there are new TWI lateral precordial lead compared to inital ecg.  pt does not meet Sgarbossa criteria for STEMI.  check 2d echo    2. Troponinemia - demand ischemia (type 2) in setting of infection, dynamic st changes with pleurisy chest pain.  trops now downtrending.  no need to trend, no need for ACS treatment    3. Cardiomyopathy - pt is on unusual regimen for cm - ie digoxin and ivabradine. given low GFR please Check dig level to r/o dig effect and toxicity.  Dig level should be <1.0 Ivabradine is for systolic HF in patients unable to toelrate BB also utilized for inappropriate sinus tachycardia (MOA: SA node negative chronotropy).  check 2d echo

## 2020-01-15 NOTE — PROGRESS NOTE ADULT - SUBJECTIVE AND OBJECTIVE BOX
INTERVAL HPI/OVERNIGHT EVENTS:    OVERNIGHT: Had CP and difficulty breathing overnight. Started on HFNC because of rhonchi heard on lung exam. Given duonebs. EKG showed new v5 and v6. Trop elevated. ABG unchanged. Thought to be type II demand ischemia.     SUBJECTIVE: Patient seen and examined at bedside. Weaned off of HFNC. No complaints. Denies CP and SOB.     OBJECTIVE:    VITAL SIGNS:  ICU Vital Signs Last 24 Hrs  T(C): 36.8 (15 Rios 2020 05:45), Max: 37.9 (15 Rios 2020 00:30)  T(F): 98.2 (15 Rios 2020 05:45), Max: 100.2 (15 Rios 2020 00:30)  HR: 64 (15 Rios 2020 05:45) (62 - 100)  BP: 119/64 (15 Rios 2020 05:45) (119/64 - 159/73)  BP(mean): --  ABP: --  ABP(mean): --  RR: 18 (15 Rios 2020 05:45) (16 - 18)  SpO2: 98% (15 Rios 2020 05:45) (96% - 100%)        01-14 @ 07:01  -  01-15 @ 07:00  --------------------------------------------------------  IN: 765 mL / OUT: 650 mL / NET: 115 mL      CAPILLARY BLOOD GLUCOSE          PHYSICAL EXAM:  General: NAD, comfortable while breathing on nasal cannula  HEENT: NCAT, PERRL, clear conjunctiva, no scleral icterus  Neck: supple, no JVD  Respiratory: rhonchi heard in mid and lower lung fields bilaterally  Cardiovascular: RRR, normal S1S2, no M/R/G  Vascular: 2+ radial and DP pulses  Abdomen: soft, NT/ND, bowel sounds in all four quadrants, no palpable masses  Extremities: WWP, no clubbing, cyanosis, or edema  Skin: No rashes present  Neuro: A&Ox1 (person only), NFD, cranial nerves in tact   MSK: no joint swelling    MEDICATIONS:  MEDICATIONS  (STANDING):  albuterol/ipratropium for Nebulization 3 milliLiter(s) Nebulizer every 4 hours  atorvastatin 40 milliGRAM(s) Oral daily  digoxin     Tablet 0.125 milliGRAM(s) Oral every other day  enoxaparin Injectable 30 milliGRAM(s) SubCutaneous every 24 hours  gabapentin 100 milliGRAM(s) Oral every 12 hours  influenza   Vaccine 0.5 milliLiter(s) IntraMuscular once  ivabradine 5 milliGRAM(s) Oral two times a day  levothyroxine 100 MICROGram(s) Oral daily  oseltamivir 30 milliGRAM(s) Oral every 12 hours  sodium chloride 0.9%. 500 milliLiter(s) (70 mL/Hr) IV Continuous <Continuous>  venlafaxine 75 milliGRAM(s) Oral every 12 hours    MEDICATIONS  (PRN):  guaiFENesin   Syrup  (Sugar-Free) 100 milliGRAM(s) Oral every 6 hours PRN Cough      ALLERGIES:  Allergies    No Known Allergies    Intolerances        LABS:                        12.2   11.51 )-----------( 138      ( 15 Rios 2020 05:33 )             37.4     01-15    134<L>  |  103  |  47<H>  ----------------------------<  102<H>  3.8   |  17<L>  |  2.96<H>    Ca    7.0<L>      15 Rios 2020 05:33  Phos  4.0     01-15  Mg     2.2     01-15    TPro  6.2  /  Alb  3.1<L>  /  TBili  0.5  /  DBili  x   /  AST  390<H>  /  ALT  249<H>  /  AlkPhos  97  01-15    PT/INR - ( 13 Jan 2020 13:27 )   PT: 13.8 sec;   INR: 1.20          PTT - ( 13 Jan 2020 13:27 )  PTT:37.7 sec  Urinalysis Basic - ( 13 Jan 2020 13:56 )    Color: Yellow / Appearance: Hazy / SG: >=1.030 / pH: x  Gluc: x / Ketone: NEGATIVE  / Bili: Negative / Urobili: 0.2 E.U./dL   Blood: x / Protein: 100 mg/dL / Nitrite: NEGATIVE   Leuk Esterase: NEGATIVE / RBC: < 5 /HPF / WBC < 5 /HPF   Sq Epi: x / Non Sq Epi: Many /HPF / Bacteria: Many /HPF        RADIOLOGY & ADDITIONAL TESTS: Reviewed. INTERVAL HPI/OVERNIGHT EVENTS:    OVERNIGHT: Had CP and difficulty breathing overnight. Started on HFNC because of rhonchi heard on lung exam. Given duonebs. EKG showed new v5 and v6. Trop elevated. ABG unchanged. Thought to be type II demand ischemia.     SUBJECTIVE: Patient seen and examined at bedside. Weaned off of HFNC. No complaints. Denies CP and SOB. 12 pt ROS is otherwise negative    OBJECTIVE:    VITAL SIGNS:  ICU Vital Signs Last 24 Hrs  T(C): 36.8 (15 Rios 2020 05:45), Max: 37.9 (15 Rios 2020 00:30)  T(F): 98.2 (15 Rios 2020 05:45), Max: 100.2 (15 Rios 2020 00:30)  HR: 64 (15 Rios 2020 05:45) (62 - 100)  BP: 119/64 (15 Rios 2020 05:45) (119/64 - 159/73)  BP(mean): --  ABP: --  ABP(mean): --  RR: 18 (15 Rios 2020 05:45) (16 - 18)  SpO2: 98% (15 Rios 2020 05:45) (96% - 100%)        01-14 @ 07:01  -  01-15 @ 07:00  --------------------------------------------------------  IN: 765 mL / OUT: 650 mL / NET: 115 mL      CAPILLARY BLOOD GLUCOSE          PHYSICAL EXAM:  General: NAD, comfortable while breathing on nasal cannula  HEENT: NCAT, PERRL, clear conjunctiva, no scleral icterus  Neck: supple, no JVD  Respiratory: rhonchi heard in mid and lower lung fields bilaterally  Cardiovascular: RRR, normal S1S2, no M/R/G  Vascular: 2+ radial and DP pulses  Abdomen: soft, NT/ND, bowel sounds in all four quadrants, no palpable masses  Extremities: WWP, no clubbing, cyanosis, or edema  Skin: No rashes present  Neuro: A&Ox1 (person only), NFD, cranial nerves in tact   MSK: no joint swelling    MEDICATIONS:  MEDICATIONS  (STANDING):  albuterol/ipratropium for Nebulization 3 milliLiter(s) Nebulizer every 4 hours  atorvastatin 40 milliGRAM(s) Oral daily  digoxin     Tablet 0.125 milliGRAM(s) Oral every other day  enoxaparin Injectable 30 milliGRAM(s) SubCutaneous every 24 hours  gabapentin 100 milliGRAM(s) Oral every 12 hours  influenza   Vaccine 0.5 milliLiter(s) IntraMuscular once  ivabradine 5 milliGRAM(s) Oral two times a day  levothyroxine 100 MICROGram(s) Oral daily  oseltamivir 30 milliGRAM(s) Oral every 12 hours  sodium chloride 0.9%. 500 milliLiter(s) (70 mL/Hr) IV Continuous <Continuous>  venlafaxine 75 milliGRAM(s) Oral every 12 hours    MEDICATIONS  (PRN):  guaiFENesin   Syrup  (Sugar-Free) 100 milliGRAM(s) Oral every 6 hours PRN Cough      ALLERGIES:  Allergies    No Known Allergies    Intolerances        LABS:                        12.2   11.51 )-----------( 138      ( 15 Rios 2020 05:33 )             37.4     01-15    134<L>  |  103  |  47<H>  ----------------------------<  102<H>  3.8   |  17<L>  |  2.96<H>    Ca    7.0<L>      15 Rios 2020 05:33  Phos  4.0     01-15  Mg     2.2     01-15    TPro  6.2  /  Alb  3.1<L>  /  TBili  0.5  /  DBili  x   /  AST  390<H>  /  ALT  249<H>  /  AlkPhos  97  01-15    PT/INR - ( 13 Jan 2020 13:27 )   PT: 13.8 sec;   INR: 1.20          PTT - ( 13 Jan 2020 13:27 )  PTT:37.7 sec  Urinalysis Basic - ( 13 Jan 2020 13:56 )    Color: Yellow / Appearance: Hazy / SG: >=1.030 / pH: x  Gluc: x / Ketone: NEGATIVE  / Bili: Negative / Urobili: 0.2 E.U./dL   Blood: x / Protein: 100 mg/dL / Nitrite: NEGATIVE   Leuk Esterase: NEGATIVE / RBC: < 5 /HPF / WBC < 5 /HPF   Sq Epi: x / Non Sq Epi: Many /HPF / Bacteria: Many /HPF        RADIOLOGY & ADDITIONAL TESTS: Reviewed.

## 2020-01-15 NOTE — CONSULT NOTE ADULT - SUBJECTIVE AND OBJECTIVE BOX
Patient is a 88y old  Female who presents with a chief complaint of sepsis (15 Rios 2020 09:03)       HPI:  88F PMH HTN, HLD, and "leaky valves" presents with shortness of breath for one day. Most of the history is provided by patient's niece Pebbles Kan (HCP) at bedside. The niece reports that the patient was in [place] for 3 weeks and got back 4 days ago. During the time the patient was away, she hasn't been taking her medications compliantly because of her forgetfullness. She resumed her medications yesterday. Yesterday morning the patient started feeling short of breath and progressively got worse over time with her breathing becoming more labored. Her niece noticed that by the end of the day she was gasping for air and decided to bring her into the emergency room. The niece says that 4 days ago when she picked her up from the airport, the patient forgot her coat on the plane and was walking in the cold to her car without her coat. Otherwise, she denies any fevers, chills, night sweats, chest pain, abdominal pain, n/v/d/c, sick contacts. She lives with a friend at home and ambulates using a walker.     ED Course:  Vitals: T 102F (rectal) HR 90 /90 RR 22 O2 98% (nonrebreather)  Labs: Wbc 21.03 Hb 14.0 Plt 198 CO2 19 BUN/Cr 22/1.62 Trop 0.07 BNP 40,975 Alkphos 155  ALT 28   CXR Clear, UA neg, EKG LBBB unchanged from prior  Given Tylenol 650mg, Solumedrol 120mg IV x1, Zosyn 3.375g IV x1, Vancomycin 1g IV x1, Duoneb x3, placed on BIPAP (13 Jan 2020 16:04)      PAST MEDICAL & SURGICAL HISTORY:  HLD (hyperlipidemia)  Hypertension  No significant past surgical history      MEDICATIONS  (STANDING):  albuterol/ipratropium for Nebulization 3 milliLiter(s) Nebulizer every 4 hours  atorvastatin 40 milliGRAM(s) Oral daily  digoxin     Tablet 0.125 milliGRAM(s) Oral every other day  enoxaparin Injectable 30 milliGRAM(s) SubCutaneous every 24 hours  gabapentin 100 milliGRAM(s) Oral every 12 hours  influenza   Vaccine 0.5 milliLiter(s) IntraMuscular once  ivabradine 5 milliGRAM(s) Oral two times a day  levothyroxine 100 MICROGram(s) Oral daily  oseltamivir 30 milliGRAM(s) Oral every 12 hours  sodium chloride 0.9%. 500 milliLiter(s) (70 mL/Hr) IV Continuous <Continuous>  venlafaxine 75 milliGRAM(s) Oral every 12 hours    MEDICATIONS  (PRN):  guaiFENesin   Syrup  (Sugar-Free) 100 milliGRAM(s) Oral every 6 hours PRN Cough    FAMILY HISTORY:      CBC Full  -  ( 15 Rios 2020 05:33 )  WBC Count : 11.51 K/uL  RBC Count : 4.05 M/uL  Hemoglobin : 12.2 g/dL  Hematocrit : 37.4 %  Platelet Count - Automated : 138 K/uL  Mean Cell Volume : 92.3 fl  Mean Cell Hemoglobin : 30.1 pg  Mean Cell Hemoglobin Concentration : 32.6 gm/dL  Auto Neutrophil # : 9.74 K/uL  Auto Lymphocyte # : 0.81 K/uL  Auto Monocyte # : 0.88 K/uL  Auto Eosinophil # : 0.00 K/uL  Auto Basophil # : 0.01 K/uL  Auto Neutrophil % : 84.7 %  Auto Lymphocyte % : 7.0 %  Auto Monocyte % : 7.6 %  Auto Eosinophil % : 0.0 %  Auto Basophil % : 0.1 %      01-15    134<L>  |  103  |  47<H>  ----------------------------<  102<H>  3.8   |  17<L>  |  2.96<H>    Ca    7.0<L>      15 Rios 2020 05:33  Phos  4.0     01-15  Mg     2.2     01-15    TPro  6.2  /  Alb  3.1<L>  /  TBili  0.5  /  DBili  x   /  AST  390<H>  /  ALT  249<H>  /  AlkPhos  97  01-15      Urinalysis Basic - ( 13 Jan 2020 13:56 )    Color: Yellow / Appearance: Hazy / SG: >=1.030 / pH: x  Gluc: x / Ketone: NEGATIVE  / Bili: Negative / Urobili: 0.2 E.U./dL   Blood: x / Protein: 100 mg/dL / Nitrite: NEGATIVE   Leuk Esterase: NEGATIVE / RBC: < 5 /HPF / WBC < 5 /HPF   Sq Epi: x / Non Sq Epi: Many /HPF / Bacteria: Many /HPF          Radiology:    < from: Xray Chest 1 View- PORTABLE-Urgent (01.15.20 @ 06:55) >    EXAM:  XR CHEST PORTABLE URGENT 1V                          PROCEDURE DATE:  01/15/2020          INTERPRETATION:  Clinical history/reason for exam: Shortness of breath.    Single frontal view.    Comparison: January 15, 2020. time 1:11 AM.    Findings/  impression: Right lung pathology and cardiomegaly, stable. Stable bony structures.        Vital Signs Last 24 Hrs  T(C): 36.8 (15 Rios 2020 05:45), Max: 37.9 (15 Rios 2020 00:30)  T(F): 98.2 (15 Rios 2020 05:45), Max: 100.2 (15 Rios 2020 00:30)  HR: 64 (15 Rios 2020 05:45) (62 - 100)  BP: 119/64 (15 Rios 2020 05:45) (119/64 - 159/73)  BP(mean): --  RR: 18 (15 Rios 2020 05:45) (16 - 18)  SpO2: 98% (15 Rios 2020 05:45) (96% - 100%)    REVIEW OF SYSTEMS:    CONSTITUTIONAL:  fatigue  EYES: No eye pain, visual disturbances, or discharge  ENMT:  No difficulty hearing, tinnitus, vertigo; No sinus or throat pain  NECK: No pain or stiffness  BREASTS: No pain, masses, or nipple discharge  RESPIRATORY: No cough, wheezing, chills or hemoptysis; No shortness of breath  CARDIOVASCULAR: No chest pain, palpitations, dizziness, or leg swelling  GASTROINTESTINAL: No abdominal or epigastric pain. No nausea, vomiting, or hematemesis; No diarrhea or constipation. No melena or hematochezia.  GENITOURINARY: No dysuria, frequency, hematuria, or incontinence  NEUROLOGICAL: No headaches, memory loss, loss of strength, numbness, or tremors  SKIN: No itching, burning, rashes, or lesions   LYMPH NODES: No enlarged glands  ENDOCRINE: No heat or cold intolerance; No hair loss  MUSCULOSKELETAL: No joint pain or swelling; No muscle, back, or extremity pain  PSYCHIATRIC: No depression, anxiety, mood swings, or difficulty sleeping  HEME/LYMPH: No easy bruising, or bleeding gums  ALLERGY AND IMMUNOLOGIC: No hives or eczema  VASCULAR: no swelling, erythema        Physical Exam: on droplet isolation precautions, 89 yo  woman lying in semi Omrrow's position, Indonesian speaking, no acute complaints    Head: normocephalic, atraumatic    Eyes: PERRLA, EOMI, no nystagmus, sclera anicteric    ENT: nasal discharge, uvula midline, no oropharyngeal erythema/exudate    Neck: supple, negative JVD, negative carotid bruits, no thyromegaly    Chest: CTA bilaterally, neg wheeze/ rhonchi/ rales/ crackles/ egophany    Cardiovascular: regular rate and rhythm, neg murmurs/rubs/gallops    Abdomen: soft, non distended, non tender, negative rebound/guarding, normal bowel sounds, neg hepatosplenomegaly    Extremities: WWP, neg cyanosis/clubbing/edema, negative calf tenderness to palpation, negative Antelmo's sign    :     Neurologic Exam:    Alert and oriented to person, place, date/year, speech fluent w/o dysarthria, recent and remote memory intact, repetition intact, comprehension intact    Cranial Nerves:     II:                       pupils equal, round and reactive to light, visual fields intact   III/ IV/VI:            extraocular movements intact, neg nystagmus, neg ptosis  V:                       facial sensation intact, V1-3 normal  VII:                     face symmetric, no droop, normal eye closure and smile  VIII:                    hearing intact to finger rub bilaterally  IX/ X:                 soft palate rise symmetrical  XI:                      head turning, shoulder shrug normal  XII:                     tongue midline    Motor Exam:    Upper Extremities:     RIght:  no focal weakness               negative drift    Left :     no focal weakness               negative drift    Lower Extremities:                 Right:    no focal weakness                 Left:       no focal weakness                 Sensory:    intact to LT/PP in all UE/LE dermatomes    DTR:            = biceps/     triceps/     brachioradialis                      = patella/   medial hamstring/ankle                      neg clonus                      neg Babinski                        Gait:  not tested        PM&R Impression:    1) deconditioned  2) no focal weakness        Recommendations:    1) Physical therapy focusing on therapeutic exercises, bed mobility/transfer out of bed evaluation, progressive ambulation with assistive devices prn.    2) Disposition Plan/Recs: pending functional progress

## 2020-01-16 LAB
-  CEFAZOLIN: SIGNIFICANT CHANGE UP
-  CLINDAMYCIN: SIGNIFICANT CHANGE UP
-  DAPTOMYCIN: SIGNIFICANT CHANGE UP
-  ERYTHROMYCIN: SIGNIFICANT CHANGE UP
-  LINEZOLID: SIGNIFICANT CHANGE UP
-  OXACILLIN: SIGNIFICANT CHANGE UP
-  PENICILLIN: SIGNIFICANT CHANGE UP
-  RIFAMPIN: SIGNIFICANT CHANGE UP
-  TETRACYCLINE: SIGNIFICANT CHANGE UP
-  TRIMETHOPRIM/SULFAMETHOXAZOLE: SIGNIFICANT CHANGE UP
-  VANCOMYCIN: SIGNIFICANT CHANGE UP
ALBUMIN SERPL ELPH-MCNC: 2.9 G/DL — LOW (ref 3.3–5)
ALP SERPL-CCNC: 99 U/L — SIGNIFICANT CHANGE UP (ref 40–120)
ALT FLD-CCNC: 191 U/L — HIGH (ref 10–45)
ANION GAP SERPL CALC-SCNC: 15 MMOL/L — SIGNIFICANT CHANGE UP (ref 5–17)
AST SERPL-CCNC: 236 U/L — HIGH (ref 10–40)
BASOPHILS # BLD AUTO: 0.01 K/UL — SIGNIFICANT CHANGE UP (ref 0–0.2)
BASOPHILS NFR BLD AUTO: 0.1 % — SIGNIFICANT CHANGE UP (ref 0–2)
BILIRUB SERPL-MCNC: 0.5 MG/DL — SIGNIFICANT CHANGE UP (ref 0.2–1.2)
BUN SERPL-MCNC: 48 MG/DL — HIGH (ref 7–23)
CALCIUM SERPL-MCNC: 7.1 MG/DL — LOW (ref 8.4–10.5)
CHLORIDE SERPL-SCNC: 106 MMOL/L — SIGNIFICANT CHANGE UP (ref 96–108)
CO2 SERPL-SCNC: 17 MMOL/L — LOW (ref 22–31)
CREAT SERPL-MCNC: 2.88 MG/DL — HIGH (ref 0.5–1.3)
CULTURE RESULTS: SIGNIFICANT CHANGE UP
EOSINOPHIL # BLD AUTO: 0 K/UL — SIGNIFICANT CHANGE UP (ref 0–0.5)
EOSINOPHIL NFR BLD AUTO: 0 % — SIGNIFICANT CHANGE UP (ref 0–6)
GLUCOSE BLDC GLUCOMTR-MCNC: 119 MG/DL — HIGH (ref 70–99)
GLUCOSE SERPL-MCNC: 91 MG/DL — SIGNIFICANT CHANGE UP (ref 70–99)
HCT VFR BLD CALC: 38.4 % — SIGNIFICANT CHANGE UP (ref 34.5–45)
HGB BLD-MCNC: 11.6 G/DL — SIGNIFICANT CHANGE UP (ref 11.5–15.5)
IMM GRANULOCYTES NFR BLD AUTO: 0.9 % — SIGNIFICANT CHANGE UP (ref 0–1.5)
LYMPHOCYTES # BLD AUTO: 0.81 K/UL — LOW (ref 1–3.3)
LYMPHOCYTES # BLD AUTO: 10.1 % — LOW (ref 13–44)
MAGNESIUM SERPL-MCNC: 2.3 MG/DL — SIGNIFICANT CHANGE UP (ref 1.6–2.6)
MCHC RBC-ENTMCNC: 29 PG — SIGNIFICANT CHANGE UP (ref 27–34)
MCHC RBC-ENTMCNC: 30.2 GM/DL — LOW (ref 32–36)
MCV RBC AUTO: 96 FL — SIGNIFICANT CHANGE UP (ref 80–100)
METHOD TYPE: SIGNIFICANT CHANGE UP
METHOD TYPE: SIGNIFICANT CHANGE UP
MONOCYTES # BLD AUTO: 0.72 K/UL — SIGNIFICANT CHANGE UP (ref 0–0.9)
MONOCYTES NFR BLD AUTO: 9 % — SIGNIFICANT CHANGE UP (ref 2–14)
NEUTROPHILS # BLD AUTO: 6.4 K/UL — SIGNIFICANT CHANGE UP (ref 1.8–7.4)
NEUTROPHILS NFR BLD AUTO: 79.9 % — HIGH (ref 43–77)
NRBC # BLD: 0 /100 WBCS — SIGNIFICANT CHANGE UP (ref 0–0)
ORGANISM # SPEC MICROSCOPIC CNT: SIGNIFICANT CHANGE UP
PHOSPHATE SERPL-MCNC: 3.7 MG/DL — SIGNIFICANT CHANGE UP (ref 2.5–4.5)
PLATELET # BLD AUTO: 117 K/UL — LOW (ref 150–400)
POTASSIUM SERPL-MCNC: 3.8 MMOL/L — SIGNIFICANT CHANGE UP (ref 3.5–5.3)
POTASSIUM SERPL-SCNC: 3.8 MMOL/L — SIGNIFICANT CHANGE UP (ref 3.5–5.3)
PROT SERPL-MCNC: 5.7 G/DL — LOW (ref 6–8.3)
RBC # BLD: 4 M/UL — SIGNIFICANT CHANGE UP (ref 3.8–5.2)
RBC # FLD: 13.4 % — SIGNIFICANT CHANGE UP (ref 10.3–14.5)
SODIUM SERPL-SCNC: 138 MMOL/L — SIGNIFICANT CHANGE UP (ref 135–145)
SPECIMEN SOURCE: SIGNIFICANT CHANGE UP
VANCOMYCIN FLD-MCNC: 21.4 UG/ML — SIGNIFICANT CHANGE UP
WBC # BLD: 8.01 K/UL — SIGNIFICANT CHANGE UP (ref 3.8–10.5)
WBC # FLD AUTO: 8.01 K/UL — SIGNIFICANT CHANGE UP (ref 3.8–10.5)

## 2020-01-16 PROCEDURE — 99232 SBSQ HOSP IP/OBS MODERATE 35: CPT

## 2020-01-16 PROCEDURE — 99231 SBSQ HOSP IP/OBS SF/LOW 25: CPT

## 2020-01-16 PROCEDURE — 99233 SBSQ HOSP IP/OBS HIGH 50: CPT | Mod: GC

## 2020-01-16 PROCEDURE — 71045 X-RAY EXAM CHEST 1 VIEW: CPT | Mod: 26

## 2020-01-16 PROCEDURE — 93010 ELECTROCARDIOGRAM REPORT: CPT

## 2020-01-16 RX ORDER — IPRATROPIUM/ALBUTEROL SULFATE 18-103MCG
3 AEROSOL WITH ADAPTER (GRAM) INHALATION ONCE
Refills: 0 | Status: COMPLETED | OUTPATIENT
Start: 2020-01-16 | End: 2020-01-16

## 2020-01-16 RX ORDER — DIGOXIN 250 MCG
0.25 TABLET ORAL ONCE
Refills: 0 | Status: DISCONTINUED | OUTPATIENT
Start: 2020-01-16 | End: 2020-01-16

## 2020-01-16 RX ORDER — CEFTAROLINE FOSAMIL 600 MG/20ML
400 POWDER, FOR SOLUTION INTRAVENOUS EVERY 12 HOURS
Refills: 0 | Status: DISCONTINUED | OUTPATIENT
Start: 2020-01-16 | End: 2020-01-27

## 2020-01-16 RX ORDER — ACETAMINOPHEN 500 MG
1000 TABLET ORAL ONCE
Refills: 0 | Status: DISCONTINUED | OUTPATIENT
Start: 2020-01-16 | End: 2020-01-16

## 2020-01-16 RX ORDER — DIGOXIN 250 MCG
0.12 TABLET ORAL ONCE
Refills: 0 | Status: DISCONTINUED | OUTPATIENT
Start: 2020-01-16 | End: 2020-01-16

## 2020-01-16 RX ORDER — SODIUM CHLORIDE 9 MG/ML
250 INJECTION INTRAMUSCULAR; INTRAVENOUS; SUBCUTANEOUS ONCE
Refills: 0 | Status: COMPLETED | OUTPATIENT
Start: 2020-01-16 | End: 2020-01-16

## 2020-01-16 RX ORDER — MORPHINE SULFATE 50 MG/1
1 CAPSULE, EXTENDED RELEASE ORAL EVERY 4 HOURS
Refills: 0 | Status: DISCONTINUED | OUTPATIENT
Start: 2020-01-16 | End: 2020-01-22

## 2020-01-16 RX ORDER — ACETAMINOPHEN 500 MG
650 TABLET ORAL ONCE
Refills: 0 | Status: DISCONTINUED | OUTPATIENT
Start: 2020-01-16 | End: 2020-01-16

## 2020-01-16 RX ORDER — MORPHINE SULFATE 50 MG/1
1 CAPSULE, EXTENDED RELEASE ORAL ONCE
Refills: 0 | Status: DISCONTINUED | OUTPATIENT
Start: 2020-01-16 | End: 2020-01-16

## 2020-01-16 RX ADMIN — Medication 3 MILLILITER(S): at 12:44

## 2020-01-16 RX ADMIN — Medication 30 MILLIGRAM(S): at 06:26

## 2020-01-16 RX ADMIN — Medication 3 MILLILITER(S): at 14:42

## 2020-01-16 RX ADMIN — Medication 3 MILLILITER(S): at 07:09

## 2020-01-16 RX ADMIN — Medication 100 MICROGRAM(S): at 06:26

## 2020-01-16 RX ADMIN — SODIUM CHLORIDE 500 MILLILITER(S): 9 INJECTION INTRAMUSCULAR; INTRAVENOUS; SUBCUTANEOUS at 14:42

## 2020-01-16 RX ADMIN — MORPHINE SULFATE 1 MILLIGRAM(S): 50 CAPSULE, EXTENDED RELEASE ORAL at 15:51

## 2020-01-16 RX ADMIN — Medication 3 MILLILITER(S): at 03:51

## 2020-01-16 RX ADMIN — ENOXAPARIN SODIUM 30 MILLIGRAM(S): 100 INJECTION SUBCUTANEOUS at 18:09

## 2020-01-16 RX ADMIN — CEFTAROLINE FOSAMIL 50 MILLIGRAM(S): 600 POWDER, FOR SOLUTION INTRAVENOUS at 19:06

## 2020-01-16 RX ADMIN — Medication 3 MILLILITER(S): at 08:48

## 2020-01-16 RX ADMIN — ATORVASTATIN CALCIUM 40 MILLIGRAM(S): 80 TABLET, FILM COATED ORAL at 12:44

## 2020-01-16 RX ADMIN — IVABRADINE 5 MILLIGRAM(S): 7.5 TABLET, FILM COATED ORAL at 09:11

## 2020-01-16 RX ADMIN — Medication 75 MILLIGRAM(S): at 06:26

## 2020-01-16 RX ADMIN — GABAPENTIN 100 MILLIGRAM(S): 400 CAPSULE ORAL at 06:26

## 2020-01-16 RX ADMIN — Medication 3 MILLILITER(S): at 15:51

## 2020-01-16 RX ADMIN — Medication 3 MILLILITER(S): at 22:59

## 2020-01-16 RX ADMIN — Medication 3 MILLILITER(S): at 18:09

## 2020-01-16 RX ADMIN — Medication 40 MILLIGRAM(S): at 13:51

## 2020-01-16 NOTE — DIETITIAN INITIAL EVALUATION ADULT. - PROBLEM SELECTOR PLAN 4
presenting with an elevated troponin of 0.07 in the setting of demand ischemia   - EKG with no ST changes, LBBB unchanged from prior EKG    #?cardiac arrythmia  pt on digoxin 0.125mg every other day and ivabradine 5mg at home with meals. Unsure why. Will continue. Called cardiologist Dr. Jamaal Shaw for more information [phone]  -obtain more collateral    # elevated INR  - INR 1.2 likely in the setting of poor PO intake and vit K def

## 2020-01-16 NOTE — DIETITIAN INITIAL EVALUATION ADULT. - ADD RECOMMEND
1.Daily weights 2.Continue present diet and add Ensure Enlive BID 3.Monitor renal parameters and or need to change diet to renal should be improve and renal lytes  warrant change. 4.Patient must be assisted and encouraged with po intake.

## 2020-01-16 NOTE — PROGRESS NOTE ADULT - SUBJECTIVE AND OBJECTIVE BOX
INTERVAL HPI/OVERNIGHT EVENTS:    OVERNIGHT: No overnight events.  SUBJECTIVE: Patient seen and examined at bedside. Slightly short of breath. denies pain    REVIEW OF SYSTEMS:    All other review of systems is negative unless indicated above.      Allergies    No Known Allergies    Intolerances        ANTIBIOTICS/RELEVANT:  antimicrobials  oseltamivir 30 milliGRAM(s) Oral every 12 hours    immunologic:  influenza   Vaccine 0.5 milliLiter(s) IntraMuscular once    OTHER:  albuterol/ipratropium for Nebulization 3 milliLiter(s) Nebulizer every 4 hours  atorvastatin 40 milliGRAM(s) Oral daily  digoxin     Tablet 0.125 milliGRAM(s) Oral every other day  digoxin     Tablet 0.125 milliGRAM(s) Oral once  enoxaparin Injectable 30 milliGRAM(s) SubCutaneous every 24 hours  gabapentin 100 milliGRAM(s) Oral every 12 hours  guaiFENesin   Syrup  (Sugar-Free) 100 milliGRAM(s) Oral every 6 hours PRN  ivabradine 5 milliGRAM(s) Oral two times a day  levothyroxine 100 MICROGram(s) Oral daily  morphine  - Injectable 1 milliGRAM(s) IV Push every 4 hours PRN  venlafaxine 75 milliGRAM(s) Oral every 12 hours      Objective:  Vital Signs Last 24 Hrs  T(C): 36.1 (16 Jan 2020 16:10), Max: 37.9 (16 Jan 2020 13:31)  T(F): 96.9 (16 Jan 2020 16:10), Max: 100.2 (16 Jan 2020 13:31)  HR: 103 (16 Jan 2020 16:10) (55 - 147)  BP: 109/71 (16 Jan 2020 16:10) (109/71 - 174/99)  BP(mean): --  RR: 20 (16 Jan 2020 16:10) (17 - 24)  SpO2: 99% (16 Jan 2020 16:10) (95% - 106%)      PHYSICAL EXAM:    General: agitated, taking off bipap mask   HEENT: NCAT, PERRL, clear conjunctiva, no scleral icterus  Neck: supple, no JVD  Respiratory: rhonchi in all lung fields with expiratory wheezes present   Cardiovascular: tachycardic, regular rhythm, normal S1S2, no M/R/G  Vascular: 2+ radial and DP pulses  Abdomen: soft, NT/ND, bowel sounds in all four quadrants, no palpable masses  Extremities: WWP, no clubbing, cyanosis, or edema  Skin: No rashes present  Neuro: A&Ox1, agiatated but redirectable when family by bedside         LABS:                        11.6   8.01  )-----------( 117      ( 16 Jan 2020 05:41 )             38.4     01-16    138  |  106  |  48<H>  ----------------------------<  91  3.8   |  17<L>  |  2.88<H>    Ca    7.1<L>      16 Jan 2020 05:41  Phos  3.7     01-16  Mg     2.3     01-16    TPro  5.7<L>  /  Alb  2.9<L>  /  TBili  0.5  /  DBili  x   /  AST  236<H>  /  ALT  191<H>  /  AlkPhos  99  01-16          MICROBIOLOGY:            RECENT CULTURES:  01-14 @ 17:29  .Blood Blood  --  --  --    No growth at 1 day.  --  01-13 @ 15:06  .Blood Blood-Peripheral  Methicillin resistant Staphylococcus aureus  Methicillin resistant Staphylococcus aureus  Blood Culture PCR  Methicillin resistant Staphylococcus aureus  PCR    Growth in aerobic and anaerobic bottles: Methicillin resistant  Staphylococcus aureus  Floor previously notified.  --  01-13 @ 14:47  .Urine Clean Catch (Midstream)  Escherichia coli  Escherichia coli  LATHA    >100,000 CFU/ml Escherichia coli  --      RADIOLOGY & ADDITIONAL STUDIES:

## 2020-01-16 NOTE — DIETITIAN INITIAL EVALUATION ADULT. - PROBLEM SELECTOR PLAN 5
presenting with alkphos 155 and  likely in the setting of sepsis v hepatic congestion.  - daily LFTs  - if worsening obtain RUQ ultrasound

## 2020-01-16 NOTE — PROGRESS NOTE ADULT - PROBLEM SELECTOR PLAN 7
presenting with alkphos 155 and  likely in the setting of sepsis v hepatic congestion. Today, LFTs uptrended to AST//191. Most likely due to sepsis.   - daily LFTs

## 2020-01-16 NOTE — PROVIDER CONTACT NOTE (OTHER) - SITUATION
pt was switched from BIPAP to NC , Pt visually developed labored breathing , accessory muscles used, -157, rectal t 100.2,

## 2020-01-16 NOTE — DIETITIAN INITIAL EVALUATION ADULT. - PROBLEM SELECTOR PLAN 2
Presented with one day history of worsening shortness of breath, crackles on exam, and edema in her bilateral lower extremities, BNP 40,975, likely CHF exacerbation secondary to the flu. Held the fluids given that she was volume overloaded requiring BIPAP. ABG pH 7.35 PO2 285  - f/u echo  - s/p lasix 20mg IVP, will resume home lasix tomorrow  - restarted home digoxin 0.125mg every other day and ivabradine 5mg twice a day with meals (obtain collateral from cardiologist Dr. Jamaal Shaw [phone])  - holding home dose of coreg from 25mg BID   - holding home isosorbide mononitrate 30mg daily  - holding enalapril 10mg daily

## 2020-01-16 NOTE — PROGRESS NOTE ADULT - ASSESSMENT
89 yo F PMHx HTN, HLD, CHF (EF 20%) admitted with sepsis due to influenza, MRSA bacteremia, UTI.   Nephrology consulted for nonoliguric NICOLÁS.      # Nonoliguric NICOLÁS   - baseline Cr 0.9 in 9/2019, 1.62 on admission, down to 2.88 from 2.96   - likely due to sepsis-related hypoperfusion, hypoxia, poor oral intake/ dehydration, unclear use of NSAIDs  - maintain adequate oral intake, assist with feeds  - acceptable electrolytes  - monitor BUN/Cr, lytes, uop  - consider  evaluation for urinary retention; bladder scan Q6hr, intermittent cath prn vs Rae cath placement  - Renal US: Echogenic kidneys bilaterally, consistent with medical renal disease. 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

## 2020-01-16 NOTE — PROGRESS NOTE ADULT - ASSESSMENT
per Internal Medicine    88F PMH HTN, HLD, HFrEF (EF 25% from 02/2019), LBBB, pre-diabetes, RLS presents with shortness of breath for one day admitted for severe sepsis secondary to influenza and found to have MRSA bacteremia. Stay complicated with possible ATN.     Problem/Plan - 1:  ·  Problem: Severe sepsis.  Plan: Presented with 3/4 SIRS criteria, febrile 102F (rectal), tachypneic to 22, leukocytosis Wbc 21.03 on admission. Creatinine elevated from baseline of 0.9 with no elevated lacted. Fluids were held because patient was thought to be fluid-overloaded (crackles in the lungs and shortness of breath requiring BIPAP). S/p Solumedrol 120mg IV x1, Zosyn 3.375g IV x1, Vancomycin 1g IV x1. Duoneb x3 in the ED. Trops elevated likely in the setting of demand ischemia now peaked. CXR clear on admission, UA neg, influenza AH3 positive. Found to have new RLL infiltrate on CXR on 01/14. Procalcitonin 83 with new WBC 13.84.   - started on tamiflu 30mg BID (started on 01/13)  - f/u urine cultures, currently gram neg rods, but unclear whether clean catch   - MRSA swab positive   - blood cultures positive for MRSA  - f/u on surveillance cultures  - holding antihypertensives for now in the setting of sepsis, can restart appropriately when needed  - Stop ceftriaxone 1 g IV daily   - started vancomycin 1250 mg; will dose daily. Follow-up on vanc trough at 4 PM     #MRSA bacteremia. See above  - follow-up on TTE   - cardiology following, appreciate recs.     Problem/Plan - 2:  ·  Problem: R/O CHF exacerbation.  Plan: Previous echo on 02/2019 with EF of 25%. Presented with one day history of worsening shortness of breath, crackles on exam, BNP 40,975, which may possibly be CHF exacerbation secondary to the flu. Held the fluids given that she was volume overloaded requiring BIPAP. ABG pH 7.35 PO2 285. Was given IV lasix 20 mg in ED. Now off BiPAP on HFNC breathing comfortably.   - f/u echo   - in setting of worsening creatinine, will hold diuretics   - restarted home digoxin 0.125mg every other day  - On ivabradine 5mg twice a day with meals at home (cardiologist Dr. Jamaal Shaw [phone])  - holding home dose of coreg from 25mg BID   - holding home isosorbide mononitrate 30mg daily  - holding enalapril 10mg BID  - daily weights   - strict I+Os.     Problem/Plan - 3:  ·  Problem: NICOLÁS (acute kidney injury).  Plan: presenting with cr 1.62 (baseline 0.9) etiology likely prerenal in the setting of severe sepsis due to flu. This AM creatinine worsened to 2.96. FENa 0.6 percent.   - obtain urine lytes  - avoid nephrotoxic agents  - hold ACE/ARB  - continue to trend CMP daily    #R/o ATN. Presented with NICOLÁS, but UOP   - repeat urine lytes  - UA with microscopic analysis for casts   - continue to closely monitor UOP.     Problem/Plan - 4:  ·  Problem: Elevated troponin.  Plan: presenting with an elevated troponin of 0.07 in the setting of demand ischemia. EKG with no ST changes, LBBB unchanged from prior EKG. Trop peaked at 0.06. Had CP and SOB on 01/15 with elevated trop to 0.09 that peaked.   - No need to continue to trend     #?cardiac arrythmia  pt on digoxin 0.125mg every other day and ivabradine 5mg at home with meals. Unsure why. Will continue. Called cardiologist Dr. Jamaal Shaw for more information [phone]  -obtain more collateral    #elevated INR  - INR 1.2 likely in the setting of poor PO intake and vit K def.     Problem/Plan - 5:  ·  Problem: Transaminitis.  Plan: presenting with alkphos 155 and  likely in the setting of sepsis v hepatic congestion. Today, LFTs uptrended to AST//249. Most likely due to sepsis.   - daily LFTs.     Problem/Plan - 6:  Problem: Hypertension. Plan: history of hypertension, currently normotensive  - holding home carvedilol 25mg BID at home  - holding home isosorbide mononitrate 30mg daily   - holding home Enalapril 10mg BID  - patient currently normotensive but can restart antihypertensives appropriately as needed.    Problem/Plan - 7:  ·  Problem: Prophylactic measure.  Plan: F: none  E:  Replete K<4, Mg< 2  N: DASH diet   Rae in place.

## 2020-01-16 NOTE — PROVIDER CONTACT NOTE (OTHER) - RECOMMENDATIONS
Pt begin pulling off BIPAP, trying to get off the bed alarm on call bell in reach, gasping for air and taping on chest showing unable to breath, Rapide response was called

## 2020-01-16 NOTE — PROGRESS NOTE ADULT - SUBJECTIVE AND OBJECTIVE BOX
Physical Medicine and Rehabilitation Progress Note:    Patient is a 88y old  Female who presents with a chief complaint of sepsis (16 Jan 2020 15:01)      HPI:  88F PMH HTN, HLD, and "leaky valves" presents with shortness of breath for one day. Most of the history is provided by patient's niece Pebbles Kan (HCP) at bedside. The niece reports that the patient was in [place] for 3 weeks and got back 4 days ago. During the time the patient was away, she hasn't been taking her medications compliantly because of her forgetfullness. She resumed her medications yesterday. Yesterday morning the patient started feeling short of breath and progressively got worse over time with her breathing becoming more labored. Her niece noticed that by the end of the day she was gasping for air and decided to bring her into the emergency room. The niece says that 4 days ago when she picked her up from the airport, the patient forgot her coat on the plane and was walking in the cold to her car without her coat. Otherwise, she denies any fevers, chills, night sweats, chest pain, abdominal pain, n/v/d/c, sick contacts. She lives with a friend at home and ambulates using a walker.     ED Course:  Vitals: T 102F (rectal) HR 90 /90 RR 22 O2 98% (nonrebreather)  Labs: Wbc 21.03 Hb 14.0 Plt 198 CO2 19 BUN/Cr 22/1.62 Trop 0.07 BNP 40,975 Alkphos 155  ALT 28   CXR Clear, UA neg, EKG LBBB unchanged from prior  Given Tylenol 650mg, Solumedrol 120mg IV x1, Zosyn 3.375g IV x1, Vancomycin 1g IV x1, Duoneb x3, placed on BIPAP (13 Jan 2020 16:04)                            11.6   8.01  )-----------( 117      ( 16 Jan 2020 05:41 )             38.4       01-16    138  |  106  |  48<H>  ----------------------------<  91  3.8   |  17<L>  |  2.88<H>    Ca    7.1<L>      16 Jan 2020 05:41  Phos  3.7     01-16  Mg     2.3     01-16    TPro  5.7<L>  /  Alb  2.9<L>  /  TBili  0.5  /  DBili  x   /  AST  236<H>  /  ALT  191<H>  /  AlkPhos  99  01-16    Vital Signs Last 24 Hrs  T(C): 36.7 (16 Jan 2020 14:20), Max: 37.9 (16 Jan 2020 13:31)  T(F): 98 (16 Jan 2020 14:20), Max: 100.2 (16 Jan 2020 13:31)  HR: 130 (16 Jan 2020 15:45) (55 - 147)  BP: 140/68 (16 Jan 2020 15:45) (113/67 - 148/75)  BP(mean): --  RR: 24 (16 Jan 2020 15:45) (17 - 24)  SpO2: 99% (16 Jan 2020 15:45) (98% - 106%)    MEDICATIONS  (STANDING):  albuterol/ipratropium for Nebulization 3 milliLiter(s) Nebulizer every 4 hours  atorvastatin 40 milliGRAM(s) Oral daily  digoxin     Tablet 0.125 milliGRAM(s) Oral every other day  enoxaparin Injectable 30 milliGRAM(s) SubCutaneous every 24 hours  gabapentin 100 milliGRAM(s) Oral every 12 hours  influenza   Vaccine 0.5 milliLiter(s) IntraMuscular once  ivabradine 5 milliGRAM(s) Oral two times a day  levothyroxine 100 MICROGram(s) Oral daily  oseltamivir 30 milliGRAM(s) Oral every 12 hours  venlafaxine 75 milliGRAM(s) Oral every 12 hours    MEDICATIONS  (PRN):  guaiFENesin   Syrup  (Sugar-Free) 100 milliGRAM(s) Oral every 6 hours PRN Cough  morphine  - Injectable 1 milliGRAM(s) IV Push every 4 hours PRN Breathlessness/respiratory distress    Currently Undergoing Physical Therapy at bedside.    Functional Status Assessment:    Previous Level of Function:     · Ambulation Skills	needs device	  · Transfer Skills	needs device	  · ADL Skills	needs device	  · Work/Leisure Activity	needs device	  · Additional Comments	As per history upon intake, pt lives with daughter and ambulates with a rolling walker. Attempted to use , however, pt speaks unclearly and the  ([id number]) was unable to understand what she was saying. Attempt to obtain more info from daughter when she is present	    Cognitive Status Examination:   · Orientation	oriented to person, place, time and situation	  · Level of Consciousness	alert	  · Follows Commands and Answers Questions	100% of the time	    Peripheral Neurovascular:     Peripheral Neurovascular:   · Peripheral Neurovascular	WDL	    Range of Motion Exam:   · Range of Motion Examination	no ROM deficits were identified	    Manual Muscle Testing:   · Manual Muscle Testing Results	no strength deficits were identified	    Bed Mobility: Sit to Supine:     · Level of [place]	contact guard	  · Physical Assist/Nonphysical Assist	verbal cues; 1 person assist	    Bed Mobility: Supine to Sit:     · Level of [place]	contact guard	  · Physical Assist/Nonphysical Assist	verbal cues; 1 person assist	    Transfer: Sit to Stand:     · Level of [place]	contact guard	  · Physical Assist/Nonphysical Assist	verbal cues; 1 person assist	  · Weight-Bearing Restrictions	full weight-bearing	  · Assistive Device	HHA	    Transfer: Stand to Sit:     · Level of [place]	contact guard	  · Physical Assist/Nonphysical Assist	verbal cues; 1 person assist	  · Weight-Bearing Restrictions	full weight-bearing	  · Assistive Device	HHA	    Sit/Stand Transfer Safety Analysis:     · Impairments Contributing to Impaired Transfers	impaired balance	    Gait Skills:     · Level of [place]	minimum assist (75% patients effort)	  · Physical Assist/Nonphysical Assist	verbal cues; 1 person assist	  · Weight-Bearing Restrictions	full weight-bearing	  · Gait Distance	bed to chair	    Gait Analysis:     · Gait Pattern Used	2-point gait	  · Gait Deviations Noted	decreased pj; increased time in double stance	  · Impairments Contributing to Gait Deviations	impaired balance	    Balance Skills Assessment:     · Sitting Balance: Static	good balance	  · Sitting Balance: Dynamic	good balance	  · Sit-to-Stand Balance	fair balance	  · Standing Balance: Static	fair balance	  · Standing Balance: Dynamic	fair balance	    Sensory Examination:   Sensory Examination:    Grossly Intact:   · Gross Sensory Examination	Grossly Intact	      Clinical Impressions:   · Criteria for Skilled Therapeutic Interventions	impairments found; rehab potential; therapy frequency; anticipated discharge recommendation; anticipated equipment needs at discharge	  · Impairments Found (describe specific impairments)	aerobic capacity/endurance; gait, locomotion, and balance	  · Rehab Potential	good, to achieve stated therapy goals	  · Therapy Frequency	2-3x/week	        PM&R Impression: as above    Current Disposition Plan Recommendations: subacute rehab placement

## 2020-01-16 NOTE — CHART NOTE - NSCHARTNOTEFT_GEN_A_CORE
***Rapid Response Clinical Impact Nurse Practitioner Note***    Patient is a 88y old  Female PMHx HTN, HLD, and "leaky valves" presents with shortness of breath for one day. Most of the history is provided by patient's niece Pebbles Kan (HCP) at bedside. The niece reports that the patient was in [place] for 3 weeks and got back 4 days ago. During the time the patient was away, she hasn't been taking her medications compliantly because of her forgetfullness. She resumed her medications yesterday. Yesterday morning the patient started feeling short of breath and progressively got worse over time with her breathing becoming more labored. Her niece noticed that by the end of the day she was gasping for air and decided to bring her into the emergency room. The niece says that 4 days ago when she picked her up from the airport, the patient forgot her coat on the plane and was walking in the cold to her car without her coat. Otherwise, she denies any fevers, chills, night sweats, chest pain, abdominal pain, n/v/d/c, sick contacts. She lives with a friend at home and ambulates using a walker.     ED Course:  Vitals: T 102F (rectal) HR 90 /90 RR 22 O2 98% (nonrebreather)  Labs: Wbc 21.03 Hb 14.0 Plt 198 CO2 19 BUN/Cr 22/1.62 Trop 0.07 BNP 40,975 Alkphos 155  ALT 28   CXR Clear, UA neg, EKG LBBB unchanged from prior  Given Tylenol 650mg, Solumedrol 120mg IV x1, Zosyn 3.375g IV x1, Vancomycin 1g IV x1, Duoneb x3, placed on BIPAP (13 Jan 2020 16:04)    Patient admitted for influenza being treated for PNA. Rapid response team called because patient with elevated MEWS score of 8 and agitation in setting of respiratory distress per bedside nursing.     Patient was seen and examined at the bedside by the rapid response team.    Patient awake and delirious attempting to climb out of bed. Unable to tolerate BIPAP. +Air hunger. Unredirectable. Per bedside team patient is DNR/DNI A and O X 0 at bedside.     Review of Systems: Unable to obtain due to patient's extremis.     Allergies    No Known Allergies    Intolerances    PAST MEDICAL & SURGICAL HISTORY:  HLD (hyperlipidemia)  Hypertension  No significant past surgical history    Vital Signs Last 24 Hrs  T(C): 36.7 (16 Jan 2020 14:20), Max: 37.9 (16 Jan 2020 13:31)  T(F): 98 (16 Jan 2020 14:20), Max: 100.2 (16 Jan 2020 13:31)  HR: 130 (16 Jan 2020 15:45) (55 - 147)  BP: 140/68 (16 Jan 2020 15:45) (113/67 - 148/75)  BP(mean): --  RR: 24 (16 Jan 2020 15:45) (17 - 24)  SpO2: 99% (16 Jan 2020 15:45) (98% - 106%)    Physical Exam:     GENERAL: Elderly female, chronic ill appearing, thrashing in bed unredirectable.   HEENT: +PERRLA. Conjuctiva pink and moist. No JVD, Trachea midline.   LUNGS: Good bilateral chest rise. +Wheezing. +gasping for air. +Air hunger.   HEART: Regular rate. S1, S2, +Tachycardia.   ABDOMEN: Soft, nontender, and nondistended, no rebound, guarding rigidity, bowel sounds in all 4 quadrants  EXTREMITIES: MURILLO X 4. +CMS X 4.   SKIN: [place] cool skin to touch.       01-15 @ 07:01 - 01-16 @ 07:00  --------------------------------------------------------  IN: 280 mL / OUT: 950 mL / NET: -670 mL    01-16 @ 07:01  -  01-16 @ 15:47  --------------------------------------------------------  IN: 0 mL / OUT: 300 mL / NET: -300 mL                              11.6   8.01  )-----------( 117      ( 16 Jan 2020 05:41 )             38.4     01-16    138  |  106  |  48<H>  ----------------------------<  91  3.8   |  17<L>  |  2.88<H>    Ca    7.1<L>      16 Jan 2020 05:41  Phos  3.7     01-16  Mg     2.3     01-16    TPro  5.7<L>  /  Alb  2.9<L>  /  TBili  0.5  /  DBili  x   /  AST  236<H>  /  ALT  191<H>  /  AlkPhos  99  01-16    ABG - ( 15 Rios 2020 01:34 )  pH, Arterial: 7.32  pH, Blood: x     /  pCO2: 36    /  pO2: 109   / HCO3: 18    / Base Excess: -7.0  /  SaO2: 98             LIVER FUNCTIONS - ( 16 Jan 2020 05:41 )  Alb: 2.9 g/dL / Pro: 5.7 g/dL / ALK PHOS: 99 U/L / ALT: 191 U/L / AST: 236 U/L / GGT: x                  MEDICATIONS  (STANDING):  albuterol/ipratropium for Nebulization 3 milliLiter(s) Nebulizer every 4 hours  atorvastatin 40 milliGRAM(s) Oral daily  digoxin     Tablet 0.125 milliGRAM(s) Oral every other day  enoxaparin Injectable 30 milliGRAM(s) SubCutaneous every 24 hours  gabapentin 100 milliGRAM(s) Oral every 12 hours  influenza   Vaccine 0.5 milliLiter(s) IntraMuscular once  ivabradine 5 milliGRAM(s) Oral two times a day  levothyroxine 100 MICROGram(s) Oral daily  morphine  - Injectable 1 milliGRAM(s) IV Push once  oseltamivir 30 milliGRAM(s) Oral every 12 hours  venlafaxine 75 milliGRAM(s) Oral every 12 hours    MEDICATIONS  (PRN):  guaiFENesin   Syrup  (Sugar-Free) 100 milliGRAM(s) Oral every 6 hours PRN Cough  morphine  - Injectable 1 milliGRAM(s) IV Push every 4 hours PRN Breathlessness/respiratory distress      Assessment- Rapid Response called for 88y year old Female with a past medical history of HTN, HLD, and "leaky valves" presents with shortness of breath for one day admitted for influenza and PNA, now status post RRT for air hunger and dyspnea. Patient is DNR and DNI per family at bedside and primary team.    Plan:  -Patient normotensive. Given 1 mg of Morphine IVP for air hunger. May repeat dose as needed within hemodynamic parameters.  -Per family at bedside and primary team, do not wish to escalate care. Patient DNR and DNI but continue antibiotics and supportive care.   -Primary team to have formal GOC discussion with family.  -Albuterol for wheezes PRN.  -BIPAP  -Enhance supervision.   -Fall Precautions.  Rest of plan per primary team at bedside.  Discussed above with family, bedside team, and PCCM Fellow.

## 2020-01-16 NOTE — PROGRESS NOTE ADULT - PROBLEM SELECTOR PLAN 6
presenting with an elevated troponin of 0.07 in the setting of demand ischemia. EKG with no ST changes, LBBB unchanged from prior EKG. Trop peaked at 0.06. Had CP and SOB on 01/15 with elevated trop to 0.09 that peaked.   - No need to continue to trend     #?cardiac arrythmia  pt on digoxin 0.125mg every other day and ivabradine 5mg at home with meals. Unsure why. Will continue. Called cardiologist Dr. Jamaal Shaw for more information [phone]  -obtain more collateral    #elevated INR  - INR 1.2 likely in the setting of poor PO intake and vit K def

## 2020-01-16 NOTE — PROGRESS NOTE ADULT - PROBLEM SELECTOR PLAN 3
Patient has increased work of breathing intermittently when placed on NC. Now on BiPAP breathing comfortably. Resolving, patient initially required BiPapp, weaned to HFNC, now NC  Likely in setting of MRSA PNA and Flu

## 2020-01-16 NOTE — DIETITIAN INITIAL EVALUATION ADULT. - PROBLEM SELECTOR PLAN 3
presenting with cr 1.62 (baseline 0.9) etiology likely prerenal in the setting of sepsis and flu v cardiorenal in the setting of heart failure exacerbation   - obtain urine lytes  - avoid nephrotoxic agents  - holding home dose of enalapril

## 2020-01-16 NOTE — PHYSICAL THERAPY INITIAL EVALUATION ADULT - CRITERIA FOR SKILLED THERAPEUTIC INTERVENTIONS
impairments found/rehab potential/therapy frequency/anticipated discharge recommendation/anticipated equipment needs at discharge

## 2020-01-16 NOTE — PROGRESS NOTE ADULT - SUBJECTIVE AND OBJECTIVE BOX
Chief Complaint/Reason for Consult: chf  INTERVAL HPI: noted for rvr  	  MEDICATIONS:  digoxin     Tablet 0.125 milliGRAM(s) Oral every other day  ivabradine 5 milliGRAM(s) Oral two times a day    oseltamivir 30 milliGRAM(s) Oral every 12 hours    albuterol/ipratropium for Nebulization 3 milliLiter(s) Nebulizer every 4 hours  guaiFENesin   Syrup  (Sugar-Free) 100 milliGRAM(s) Oral every 6 hours PRN    acetaminophen  IVPB .. 1000 milliGRAM(s) IV Intermittent once  gabapentin 100 milliGRAM(s) Oral every 12 hours  venlafaxine 75 milliGRAM(s) Oral every 12 hours      atorvastatin 40 milliGRAM(s) Oral daily  levothyroxine 100 MICROGram(s) Oral daily    enoxaparin Injectable 30 milliGRAM(s) SubCutaneous every 24 hours  influenza   Vaccine 0.5 milliLiter(s) IntraMuscular once      REVIEW OF SYSTEMS:  [x] As per HPI  CONSTITUTIONAL: No fever, weight loss, or fatigue  RESPIRATORY: No cough, wheezing, chills or hemoptysis; No Shortness of Breath  CARDIOVASCULAR: No chest pain, palpitations, dizziness, or leg swelling  GASTROINTESTINAL: No abdominal or epigastric pain. No nausea, vomiting, or hematemesis; No diarrhea or constipation. No melena or hematochezia.  MUSCULOSKELETAL: No joint pain or swelling; No muscle, back, or extremity pain  [x] All others negative	  [ ] Unable to obtain    PHYSICAL EXAM:  T(C): 36.7 (01-16-20 @ 14:20), Max: 37.9 (01-16-20 @ 13:31)  HR: 143 (01-16-20 @ 14:20) (55 - 147)  BP: 141/75 (01-16-20 @ 14:20) (113/67 - 148/75)  RR: 22 (01-16-20 @ 14:20) (17 - 24)  SpO2: 99% (01-16-20 @ 14:20) (98% - 106%)  Wt(kg): --  I&O's Summary    15 Rios 2020 07:01  -  16 Jan 2020 07:00  --------------------------------------------------------  IN: 280 mL / OUT: 950 mL / NET: -670 mL    16 Jan 2020 07:01  -  16 Jan 2020 15:02  --------------------------------------------------------  IN: 0 mL / OUT: 300 mL / NET: -300 mL        Weight (kg): 46.4 (01-16 @ 13:49)    Appearance: Normal	  HEENT:   Normal oral mucosa  Cardiovascular: Normal S1 S2, No JVD, No murmurs, No edema  Respiratory: Lungs clear to auscultation	  Gastrointestinal:  Soft, Non-tender, + BS	  Extremities: Normal range of motion, No clubbing, cyanosis or edema  Vascular: Peripheral pulses palpable 2+ bilaterally    TELEMETRY: 	    ECG:   	  RADIOLOGY:   CXR:  CT:  US:    CARDIAC TESTING:  Echocardiogram:  Catheterization:  Stress Test:      LABS:	 	    CARDIAC MARKERS:                                  11.6   8.01  )-----------( 117      ( 16 Jan 2020 05:41 )             38.4     01-16    138  |  106  |  48<H>  ----------------------------<  91  3.8   |  17<L>  |  2.88<H>    Ca    7.1<L>      16 Jan 2020 05:41  Phos  3.7     01-16  Mg     2.3     01-16    TPro  5.7<L>  /  Alb  2.9<L>  /  TBili  0.5  /  DBili  x   /  AST  236<H>  /  ALT  191<H>  /  AlkPhos  99  01-16    proBNP:   Lipid Profile:   HgA1c:   TSH:     ASSESSMENT/PLAN: 	    Interval events - ECG LBBB with MAT - recommend duoneds and solumedrol, no BB indicated, pt on SA adelso blocker already, recommend digoxin 0.25 IVP x 1 cnd repeat dig level tomorrow  daily ECGs    recommend Adv HF c/s to aid in optimizing medical regimen

## 2020-01-16 NOTE — PHYSICAL THERAPY INITIAL EVALUATION ADULT - PERTINENT HX OF CURRENT PROBLEM, REHAB EVAL
patient started feeling short of breath and progressively got worse over time with her breathing becoming more labored. Her niece noticed that by the end of the day she was gasping for air and decided to bring her into the emergency room.

## 2020-01-16 NOTE — CHART NOTE - NSCHARTNOTEFT_GEN_A_CORE
Patient was found to be tachycardic and having increased work of breathing. Patient was started on BiPAP, which improved her respiratory status but did not resolve tachycardia. While on biPap, was given steroids and duonebs. Patient ripped off the BiPAP face mask and started to have increased work of breathing accompanied by belly-breathing. A rapid was called. During this, patient was placed back on BiPAP. She was given morphine 1 mg IV push to help with her breathing. Enhanced supervision was ordered. Finally, patient was made MEWs exempt.     Family was by bedside. They helped to re-orient the patient. As per discussion with the family, they want no escalation in care and desire that the patient be made comfortable moving forward. RRT called for elevated MEWS. Patient was in respiratory distress with delirium and intolerance of BiPAP mask. Vitals noted to be tachycardiac HR ranging 100-140 (EKG with most likely multifocal atrial tachycardia), RR 30s, /80s, SpO2 95% on/off BiPAP. Lung exam notable for wheezing which is improved, after Solumedrol 40mg IV and Duonebs.     Family was called to bedside to help reorient patient discuss goals of care given that patient is DNR/DNI. Patient's daughter (HCP) was already in the hospital, as her sister is also hospitalized in intensive care at this time. Per discussion, patient's daughter and other family members understand the patient's poor prognosis.  Their wishes were to continue current management without escalation of care, and want to focus on comfort, especially in these situations when patient is in respiratory distress.  Current management includes IVF, antibiotics, non-invasive ventilation. In order to help with breathlessness, and respiratory distress, they agreed with Morphine IVP and patient was given 1mg with improvement.  She was ordered for PRN morphine and for enhanced supervision while the family cannot be present at bedside.  Lastly, a MEWs exempt order was placed. Further discussion will be needed, pending clinical status of patient.

## 2020-01-16 NOTE — DIETITIAN INITIAL EVALUATION ADULT. - OTHER INFO
87y/o female with history of HTN,HLD,CHF admitted with severe sepsis 2/2 influenza,MRSA bacteremia/UTI infection.Noted renal issues.Upon visit,patient very weak with only bites from lunch tray.No N/V/D/C or pain reported.Skin intact.136% of IBW but suspected weight loss since hospitalization due to poor po.Patent was too weak to discuss diet PTA(even in [place]).Due to limited po would advise ongoing diet and not change to renal.Would add Ensure enlive BID(700kcal and 40gmprotein) to add easy to consume calories.RN aware of need to be assisted with meals.

## 2020-01-16 NOTE — PROGRESS NOTE ADULT - SUBJECTIVE AND OBJECTIVE BOX
INTERVAL HPI/OVERNIGHT EVENTS:    OVERNIGHT: No overnight events. Fluids stopped.   SUBJECTIVE: Patient was found to be tachycardic and having increased work of breathing at around noon. Patient was started on BiPAP, which improved her respiratory status but did not resolve tachycardia. While on biPap, was given steroids and duonebs. Patient ripped off the BiPAP face mask and started to have increased work of breathing accompanied by belly-breathing. A rapid was called. During this, patient was placed back on BiPAP. She was given morphine 1 mg IV push to help with her breathing. Enhanced supervision was ordered. Finally, patient was made MEWs exempt. Family was by bedside. They helped to re-orient the patient. As per discussion with the family, they want no escalation in care and desire that the patient be made comfortable moving forward. Since starting bipap, patient is comfortable with HR now in 100-110 range.     OBJECTIVE:    VITAL SIGNS:  ICU Vital Signs Last 24 Hrs  T(C): 36.1 (16 Jan 2020 16:10), Max: 37.9 (16 Jan 2020 13:31)  T(F): 96.9 (16 Jan 2020 16:10), Max: 100.2 (16 Jan 2020 13:31)  HR: 103 (16 Jan 2020 16:10) (55 - 147)  BP: 109/71 (16 Jan 2020 16:10) (109/71 - 174/99)  BP(mean): --  ABP: --  ABP(mean): --  RR: 20 (16 Jan 2020 16:10) (17 - 24)  SpO2: 99% (16 Jan 2020 16:10) (98% - 106%)        01-15 @ 07:01 - 01-16 @ 07:00  --------------------------------------------------------  IN: 280 mL / OUT: 950 mL / NET: -670 mL    01-16 @ 07:01  -  01-16 @ 16:40  --------------------------------------------------------  IN: 0 mL / OUT: 300 mL / NET: -300 mL      CAPILLARY BLOOD GLUCOSE      POCT Blood Glucose.: 119 mg/dL (16 Jan 2020 15:23)      PHYSICAL EXAM:  General: agitated, taking off bipap mask   HEENT: NCAT, PERRL, clear conjunctiva, no scleral icterus  Neck: supple, no JVD  Respiratory: rhonchi in all lung fields with expiratory wheezes present   Cardiovascular: tachycardic, regular rhythm, normal S1S2, no M/R/G  Vascular: 2+ radial and DP pulses  Abdomen: soft, NT/ND, bowel sounds in all four quadrants, no palpable masses  Extremities: WWP, no clubbing, cyanosis, or edema  Skin: No rashes present  Neuro: A&Ox1, agiatated but redirectable when family by bedside     MEDICATIONS:  MEDICATIONS  (STANDING):  albuterol/ipratropium for Nebulization 3 milliLiter(s) Nebulizer every 4 hours  atorvastatin 40 milliGRAM(s) Oral daily  digoxin     Tablet 0.125 milliGRAM(s) Oral every other day  enoxaparin Injectable 30 milliGRAM(s) SubCutaneous every 24 hours  gabapentin 100 milliGRAM(s) Oral every 12 hours  influenza   Vaccine 0.5 milliLiter(s) IntraMuscular once  ivabradine 5 milliGRAM(s) Oral two times a day  levothyroxine 100 MICROGram(s) Oral daily  oseltamivir 30 milliGRAM(s) Oral every 12 hours  venlafaxine 75 milliGRAM(s) Oral every 12 hours    MEDICATIONS  (PRN):  guaiFENesin   Syrup  (Sugar-Free) 100 milliGRAM(s) Oral every 6 hours PRN Cough  morphine  - Injectable 1 milliGRAM(s) IV Push every 4 hours PRN Breathlessness/respiratory distress      ALLERGIES:  Allergies    No Known Allergies    Intolerances        LABS:                        11.6   8.01  )-----------( 117      ( 16 Jan 2020 05:41 )             38.4     01-16    138  |  106  |  48<H>  ----------------------------<  91  3.8   |  17<L>  |  2.88<H>    Ca    7.1<L>      16 Jan 2020 05:41  Phos  3.7     01-16  Mg     2.3     01-16    TPro  5.7<L>  /  Alb  2.9<L>  /  TBili  0.5  /  DBili  x   /  AST  236<H>  /  ALT  191<H>  /  AlkPhos  99  01-16          RADIOLOGY & ADDITIONAL TESTS: Reviewed. Hospital Course     88F PMH HTN, HLD, HFrEF (EF 25% from 02/2019), LBBB, pre-diabetes, RLS presents with shortness of breath for one day admitted for severe sepsis secondary to influenza and found to have MRSA bacteremia. Initially on ceftriaxone but switched to vancomycin on 01/14 in the setting of positive MRSA in blood cultures. Given evidence of NICOLÁS (creatinine 2.8-2.9 from baseline of 0.9), patient vancomycin was followed daily with vanc level to titrate to level between 15-20. To maintain urine output, she was started on a minimal dose of NS. Echo performed on 01/15 showed EF of 20%, severely dilated LA, and combined with akinesis of inferolateral, basal, and inferoseptal walls. Throughout her hospital stay, patient demonstrated increased work of breathing, which was managed with BiPAP. On 01/16, patient was on NC. She became tachycardic to 130-150s and was also gasping for breath. BiPAP was placed, but patient was agitated and removed the face mask. She continued to demonstrate difficulty breathing, and a rapid response was called. EKG showed MAT. She was given morphine 1 mg IVP and placed back on BiPAP. Per discussion with her family, patient will be given standing PRN order of morphine for respiratory effort. Moreover, the family is amenable to continue current management, which includes IVF, BiPAP, and antibiotics.  They wish to have no escalation of care (patient DNR/DNI) and want to focus on comfort when she has respiratory distress.       INTERVAL HPI/OVERNIGHT EVENTS:    OVERNIGHT: No overnight events. Fluids stopped.   SUBJECTIVE: Patient was found to be tachycardic and having increased work of breathing at around noon. Patient was started on BiPAP, which improved her respiratory status but did not resolve tachycardia. While on biPap, was given steroids and duonebs. Patient ripped off the BiPAP face mask and started to have increased work of breathing accompanied by belly-breathing. A rapid was called. During this, patient was placed back on BiPAP. She was given morphine 1 mg IV push to help with her breathing. Enhanced supervision was ordered. Finally, patient was made MEWs exempt. Family was by bedside. They helped to re-orient the patient. As per discussion with the family, they want no escalation in care and desire that the patient be made comfortable moving forward. Since starting bipap, patient is comfortable with HR now in 100-110 range.     OBJECTIVE:    VITAL SIGNS:  ICU Vital Signs Last 24 Hrs  T(C): 36.1 (16 Jan 2020 16:10), Max: 37.9 (16 Jan 2020 13:31)  T(F): 96.9 (16 Jan 2020 16:10), Max: 100.2 (16 Jan 2020 13:31)  HR: 103 (16 Jan 2020 16:10) (55 - 147)  BP: 109/71 (16 Jan 2020 16:10) (109/71 - 174/99)  BP(mean): --  ABP: --  ABP(mean): --  RR: 20 (16 Jan 2020 16:10) (17 - 24)  SpO2: 99% (16 Jan 2020 16:10) (98% - 106%)        01-15 @ 07:01  -  01-16 @ 07:00  --------------------------------------------------------  IN: 280 mL / OUT: 950 mL / NET: -670 mL    01-16 @ 07:01  -  01-16 @ 16:40  --------------------------------------------------------  IN: 0 mL / OUT: 300 mL / NET: -300 mL      CAPILLARY BLOOD GLUCOSE      POCT Blood Glucose.: 119 mg/dL (16 Jan 2020 15:23)      PHYSICAL EXAM:  General: agitated, taking off bipap mask   HEENT: NCAT, PERRL, clear conjunctiva, no scleral icterus  Neck: supple, no JVD  Respiratory: rhonchi in all lung fields with expiratory wheezes present   Cardiovascular: tachycardic, regular rhythm, normal S1S2, no M/R/G  Vascular: 2+ radial and DP pulses  Abdomen: soft, NT/ND, bowel sounds in all four quadrants, no palpable masses  Extremities: WWP, no clubbing, cyanosis, or edema  Skin: No rashes present  Neuro: A&Ox1, agiatated but redirectable when family by bedside     MEDICATIONS:  MEDICATIONS  (STANDING):  albuterol/ipratropium for Nebulization 3 milliLiter(s) Nebulizer every 4 hours  atorvastatin 40 milliGRAM(s) Oral daily  digoxin     Tablet 0.125 milliGRAM(s) Oral every other day  enoxaparin Injectable 30 milliGRAM(s) SubCutaneous every 24 hours  gabapentin 100 milliGRAM(s) Oral every 12 hours  influenza   Vaccine 0.5 milliLiter(s) IntraMuscular once  ivabradine 5 milliGRAM(s) Oral two times a day  levothyroxine 100 MICROGram(s) Oral daily  oseltamivir 30 milliGRAM(s) Oral every 12 hours  venlafaxine 75 milliGRAM(s) Oral every 12 hours    MEDICATIONS  (PRN):  guaiFENesin   Syrup  (Sugar-Free) 100 milliGRAM(s) Oral every 6 hours PRN Cough  morphine  - Injectable 1 milliGRAM(s) IV Push every 4 hours PRN Breathlessness/respiratory distress      ALLERGIES:  Allergies    No Known Allergies    Intolerances        LABS:                        11.6   8.01  )-----------( 117      ( 16 Jan 2020 05:41 )             38.4     01-16    138  |  106  |  48<H>  ----------------------------<  91  3.8   |  17<L>  |  2.88<H>    Ca    7.1<L>      16 Jan 2020 05:41  Phos  3.7     01-16  Mg     2.3     01-16    TPro  5.7<L>  /  Alb  2.9<L>  /  TBili  0.5  /  DBili  x   /  AST  236<H>  /  ALT  191<H>  /  AlkPhos  99  01-16          RADIOLOGY & ADDITIONAL TESTS: Reviewed.

## 2020-01-16 NOTE — PROGRESS NOTE ADULT - PROBLEM SELECTOR PLAN 1
Presented with 3/4 SIRS criteria, febrile 102F (rectal), tachypneic to 22, leukocytosis Wbc 21.03 on admission. Creatinine elevated from baseline of 0.9 with no elevated lacted. Fluids were held because patient was thought to be fluid-overloaded (crackles in the lungs and shortness of breath requiring BIPAP). S/p Solumedrol 120mg IV x1, Zosyn 3.375g IV x1, Vancomycin 1g IV x1. Duoneb x3 in the ED. Trops elevated likely in the setting of demand ischemia now peaked. CXR clear on admission, UA neg, influenza AH3 positive. Found to have new RLL infiltrate on CXR on 01/14. Procalcitonin 83 with new WBC 13.84.   - started on tamiflu 30mg BID (started on 01/13 with last day on 01/17)  - f/u urine cultures, currently growing E coli, but unclear whether clean catch   - MRSA swab positive   - blood cultures positive for MRSA  - f/u on surveillance cultures, currently NGTD  - holding antihypertensives for now in the setting of sepsis, can restart appropriately when needed  - Stopped ceftriaxone 1 g IV daily   - started vancomycin; will dose daily. 01/15 vancomycin level 25.6. Will follow-up on vancomycin level today and dose accordingly for goal of 15-20.     #MRSA bacteremia. See above  - follow-up on TTE   - cardiology following, appreciate recs

## 2020-01-16 NOTE — PHYSICAL THERAPY INITIAL EVALUATION ADULT - ADDITIONAL COMMENTS
As per history upon intake, pt lives with daughter and ambulates with a rolling walker. Attempted to use , however, pt speaks unclearly and the  (ID [id number]) was unable to understand what she was saying. Attempt to obtain more info from daughter when she is present

## 2020-01-16 NOTE — PROGRESS NOTE ADULT - ASSESSMENT
88F PMH HTN, HLD, HFrEF (EF 25% from 02/2019), LBBB, pre-diabetes, RLS presents with shortness of breath for one day admitted for severe sepsis secondary to influenza and found to have MRSA bacteremia.

## 2020-01-16 NOTE — PROVIDER CONTACT NOTE (OTHER) - ASSESSMENT
Pt was in respiratory distress , as per MD Huitron , Albuterol, IV Steroids administered, Biapa placed back on pt , EKG performed, Pt was also straight cathed due to retention

## 2020-01-16 NOTE — PROVIDER CONTACT NOTE (OTHER) - BACKGROUND
Chest Wall Pain: Costochondritis    The chest pain that you have had today is caused by costochondritis. This condition is caused by an inflammation of the cartilage joining your ribs to your breastbone. It is not caused by heart or lung problems. The inflammation may have been brought on by a blow to the chest, lifting heavy objects, intense exercise, or an illness that made you cough and sneeze. It often occurs during times of emotional stress. It can be painful, but it is not dangerous. It usually goes away in 1 to 2 weeks. But it may happen again. Rarely, a more serious condition may cause symptoms similar to costochondritis. That’s why it’s important to watch for the warning signs listed below.  Home care  Follow these guidelines when caring for yourself at home:  · If you feel that emotional stress is a cause of your condition, try to figure out the sources of that stress. It may not be obvious! Learn ways to deal with the stress in your life. This can include regular exercise, muscle relaxation, meditation, or simply taking time out for yourself. For more information about this, talk with your health care provider. Or go to your local library and look at books on “stress reduction.”  · You may use acetaminophen or ibuprofen to control pain, unless another pain medicine was prescribed. If you have liver disease or ever had a stomach ulcer, talk with your health care provider before using these medicines.  · You can also help ease pain by using a hot, wet compress or heating pad. Use this with or without a medicated skin cream that helps relieves pain.  · Do stretching exercise as advised by your provider.  · Take any prescribed medicines as directed.  Follow-up care  Follow up with your health care provider, or as advised, if you do not start to get better in the next 2 days.  When to seek medical advice  Call your health care provider right away if any of these occur:  · A change in the type of pain. Call  if it feels different, becomes more serious, lasts longer, or spreads into your shoulder, arm, neck, jaw, or back.  · Shortness of breath or pain gets worse when you breathe  · Weakness, dizziness, or fainting  · Cough with dark-colored sputum (phlegm) or blood  · Abdominal pain  · Dark red or black stools  · Fever of 100.4ºF (38ºC) or higher, or as directed by your health care provider  © 8541-4855 [employer]. [address] [place] [address]. All rights reserved. This information is not intended as a substitute for professional medical care. Always follow your healthcare professional's instructions.        Possible Biliary Colic (Presumed)    Your abdominal pain is may be due to spasm of the gallbladder. This is called gallbladder or biliary colic. The gallbladder is a small sac under the liver, which stores and releases bile. Bile is a fluid that aids in the digestion of fat. Eating fatty food stimulates the gallbladder to contract, and release the bile. A gallstone may form in this sac. Although most people do not have symptoms, when the stone moves and blocks the passage of bile out of the bladder, it can cause pain and even an infection.  To be more certain of the diagnosis, you may need to have an ultrasound, CT-scan or other special test.  A number of things increase the risk for developing gallstones:  · Women are more likely  · Obesity  · Increasing age  · Losing or gaining weight quickly  · High calorie diet  · Pregnancy  · Hormone therapy  · Diabetes  The most common symptoms are:  · Abdominal pain, cramping, aching  · Nausea, vomiting  · Fever  Many illnesses can cause these symptoms. This pain usually starts in the upper right side of your abdomen. Sometimes it can radiate to your right shoulder, back and arm. It usually starts suddenly, becomes more intense quickly, and then gradually decreases and disappears over a couple of hours. Elderly people and diabetics may have  difficulty showing where the pain is exactly.  Home care  · Rest in bed and follow a clear liquid diet until feeling better. If pain or nausea medicine was given to help with your symptoms, take these as directed.  · You can take acetaminophen or ibuprofen for pain, unless you were given a different pain medicine to use.Note: If you have chronic liver or kidney disease or ever had a stomach ulcer or GI bleeding or are taking blood thinner medications, talk with your doctor before using these medicines.  · Fat in your diet makes the gallbladder contract and may cause increased pain. Therefore, avoid fat in your diet over the next two days and follow a low-fat diet after that. If you are overweight, a low fat diet will help you lose weight.  Follow-up care  If a test was already scheduled for you, keep this appointment. Be sure you know how to prepare yourself for the test. Usually, you will be asked not to eat or drink anything for at least 8 hours before the test. Schedule an appointment with your own doctor after your test is complete to discuss the findings.  When to seek medical advice  Call your healthcare provider if any of the following occur:  · Pain gets worse or moves to the right lower abdomen  · Repeated vomiting  · Swelling of the abdomen  · Pain lasts over 6 hours  · Fever of 100.4º F (38º C) or higher, or as directed by your healthcare provider  · Weakness, dizziness  · Dark urine or light colored stools  · Yellow color of the skin or eyes  · Chest, arm, back, neck or jaw pain  Call 911  Get emergency medical care right away if any of these occur:  · Trouble breathing  · Very confused  · Very drowsy or trouble awakening  · Fainting or loss of consciousness  · Rapid heart rate  © 5068-0896 [employer]. [address] [place] [address], [place] [address]. All rights reserved. This information is not intended as a substitute for professional medical care. Always follow your healthcare professional's  instructions.         pt admitted for sepsis, DNR , DNI, pt was sitting in the chair , noted to have labored breathing

## 2020-01-16 NOTE — DIETITIAN INITIAL EVALUATION ADULT. - PROBLEM SELECTOR PLAN 1
Presented with 3/4 SIRS criteria, febrile 102F (rectal), tachypneic to 22, leukocytosis Wbc 21.03. Held the fluids given that she was volume overloaded with crackles in the lungs, lower extremity edema, and shortness of breath requiring BIPAP. S/p Solumedrol 120mg IV x1, Zosyn 3.375g IV x1, Vancomycin 1g IV x1. Duoneb x3 in the ED. Trops elevated likely in the setting of demand ischemia. CXR Clear, UA neg, RVP positive.   - started on tamiflu 30mg BID  - f/u blood cultures  - f/u urine cultures  - holding antihypertensives for now in the setting of sepsis, can restart appropriately when needed

## 2020-01-16 NOTE — PROGRESS NOTE ADULT - SUBJECTIVE AND OBJECTIVE BOX
on BiPAP, FiO2 40% overnight  no acute events  Cr down to 2.88 from 2.96  Vanco level at 25.6, Vanco held  bladder scan >300 cc, intermittent cath   uop 0.9 L/ 24hr   labs, images, chart reviewed     Meds:  albuterol/ipratropium for Nebulization 3 every 4 hours  atorvastatin 40 daily  digoxin     Tablet 0.125 every other day  enoxaparin Injectable 30 every 24 hours  gabapentin 100 every 12 hours  guaiFENesin   Syrup  (Sugar-Free) 100 every 6 hours PRN  influenza   Vaccine 0.5 once  ivabradine 5 two times a day  levothyroxine 100 daily  oseltamivir 30 every 12 hours  venlafaxine 75 every 12 hours      T(C): , Max: 36.7 (01-15-20 @ 21:30)  T(F): , Max: 98 (01-15-20 @ 21:30)  HR: 94 (01-16-20 @ 09:03)  BP: 148/75 (01-16-20 @ 08:52)  BP(mean): --  RR: 20 (01-16-20 @ 08:52)  SpO2: 106% (01-16-20 @ 09:03)  Wt(kg): --    01-15 @ 07:01  -  01-16 @ 07:00  --------------------------------------------------------  IN: 280 mL / OUT: 950 mL / NET: -670 mL      PHYSICAL EXAM:  GENERAL: alert, no acute distress at present, on 4L NC   NECK: supple, No JVD  CHEST/LUNG: diffuse inspiratory/ expiratory wheezes/ crackles   HEART: normal S1S2, RRR  ABDOMEN: Soft, Nontender, +BS, No flank tenderness  EXTREMITIES: No clubbing, cyanosis, or edema, no calf tenderness bilateral  Neurology: oriented to person, no focal neurological deficit, ?underlying dementia   SKIN: No rashes      LABS:                        11.6   8.01  )-----------( 117      ( 16 Jan 2020 05:41 )             38.4     01-16    138  |  106  |  48<H>  ----------------------------<  91  3.8   |  17<L>  |  2.88<H>    Ca    7.1<L>      16 Jan 2020 05:41  Phos  3.7     01-16  Mg     2.3     01-16    TPro  5.7<L>  /  Alb  2.9<L>  /  TBili  0.5  /  DBili  x   /  AST  236<H>  /  ALT  191<H>  /  AlkPhos  99  01-16          Sodium, Random Urine: <20 mmol/L (01-14 @ 11:35)  Creatinine, Random Urine: 62 mg/dL (01-14 @ 11:35)  Osmolality, Random Urine: 308 mosmol/kg (01-14 @ 11:35)        RADIOLOGY & ADDITIONAL STUDIES:    reviewed

## 2020-01-16 NOTE — DIETITIAN INITIAL EVALUATION ADULT. - PHYSICAL APPEARANCE
overweight/debilitated/elderly female with poor po/No wasted.NFPE not indicated at present.Increased risk of weight loss due to poor po

## 2020-01-16 NOTE — PROGRESS NOTE ADULT - PROBLEM SELECTOR PLAN 4
Previous echo on 02/2019 with EF of 25%. Presented with one day history of worsening shortness of breath, crackles on exam, BNP 40,975, which may possibly be CHF exacerbation secondary to the flu. Held the fluids given that she was volume overloaded requiring BIPAP. ABG pH 7.35 PO2 285. Was given IV lasix 20 mg in ED. Now off BiPAP on HFNC breathing comfortably.   - f/u TTE, possibly for 01/20  - in setting of worsening creatinine, will hold diuretics   - restarted home digoxin 0.125mg every other day  - On ivabradine 5mg twice a day with meals at home (cardiologist Dr. Jamaal Shaw [phone])  - holding home dose of coreg from 25mg BID   - holding home isosorbide mononitrate 30mg daily  - holding enalapril 10mg BID  - daily weights   - strict I+Os

## 2020-01-16 NOTE — PROGRESS NOTE ADULT - PROBLEM SELECTOR PLAN 5
presenting with cr 1.62 (baseline 0.9) etiology likely prerenal in the setting of severe sepsis due to flu. This AM creatinine worsened to 2.88. FENa 0.6 percent.   - avoid nephrotoxic agents  - hold ACE/ARB  - continue to trend CMP daily    #R/o ATN. Presented with NICOLÁS, but UOP   - continue to closely monitor UOP

## 2020-01-16 NOTE — DIETITIAN INITIAL EVALUATION ADULT. - ENERGY NEEDS
Ht:5ft 2inches,IBW:110lbs +/-10%,136% of IBW.BMI:27.4.Adjusted for age.Moderate protein due to renal parameters and recommendations.Fluids per team due to CHF

## 2020-01-16 NOTE — PROGRESS NOTE ADULT - ASSESSMENT
88F PMH HTN, HLD, and "leaky valves" presents with shortness of breath for one day. The niece reports that the patient was in [place] for 3 weeks and got back 4 days ago. the patient started feeling short of breath and progressively got worse over time with her breathing becoming more labored.   Vitals: T 102F (rectal) HR 90 /90 RR 22 O2, Labs: Wbc 21.03, CXR Clear, UA neg,  Flu A was positive. was started on Tamiflu. Blood culture was positive for MRSA and was started on vancomycin.     - Please stop vancomycin because of NICOLÁS  - Please start her on ceftaroline 400 q12   - please obtain a DK     Plan discussed with Dr. Marinelli and primary team.

## 2020-01-17 LAB
ALBUMIN SERPL ELPH-MCNC: 3.1 G/DL — LOW (ref 3.3–5)
ALP SERPL-CCNC: 100 U/L — SIGNIFICANT CHANGE UP (ref 40–120)
ALT FLD-CCNC: 151 U/L — HIGH (ref 10–45)
ANION GAP SERPL CALC-SCNC: 17 MMOL/L — SIGNIFICANT CHANGE UP (ref 5–17)
AST SERPL-CCNC: 144 U/L — HIGH (ref 10–40)
BILIRUB SERPL-MCNC: 0.5 MG/DL — SIGNIFICANT CHANGE UP (ref 0.2–1.2)
BUN SERPL-MCNC: 51 MG/DL — HIGH (ref 7–23)
CALCIUM SERPL-MCNC: 7.7 MG/DL — LOW (ref 8.4–10.5)
CHLORIDE SERPL-SCNC: 106 MMOL/L — SIGNIFICANT CHANGE UP (ref 96–108)
CO2 SERPL-SCNC: 16 MMOL/L — LOW (ref 22–31)
CREAT ?TM UR-MCNC: 97 MG/DL — SIGNIFICANT CHANGE UP
CREAT SERPL-MCNC: 2.97 MG/DL — HIGH (ref 0.5–1.3)
DIGOXIN SERPL-MCNC: 0.6 NG/ML — LOW (ref 0.8–2)
GLUCOSE SERPL-MCNC: 168 MG/DL — HIGH (ref 70–99)
HCT VFR BLD CALC: 40.1 % — SIGNIFICANT CHANGE UP (ref 34.5–45)
HGB BLD-MCNC: 12.6 G/DL — SIGNIFICANT CHANGE UP (ref 11.5–15.5)
MAGNESIUM SERPL-MCNC: 2.5 MG/DL — SIGNIFICANT CHANGE UP (ref 1.6–2.6)
MCHC RBC-ENTMCNC: 29.4 PG — SIGNIFICANT CHANGE UP (ref 27–34)
MCHC RBC-ENTMCNC: 31.4 GM/DL — LOW (ref 32–36)
MCV RBC AUTO: 93.7 FL — SIGNIFICANT CHANGE UP (ref 80–100)
NRBC # BLD: 0 /100 WBCS — SIGNIFICANT CHANGE UP (ref 0–0)
OSMOLALITY UR: 372 MOSMOL/KG — SIGNIFICANT CHANGE UP (ref 100–650)
PHOSPHATE SERPL-MCNC: 4.8 MG/DL — HIGH (ref 2.5–4.5)
PLATELET # BLD AUTO: 129 K/UL — LOW (ref 150–400)
POTASSIUM SERPL-MCNC: 4.3 MMOL/L — SIGNIFICANT CHANGE UP (ref 3.5–5.3)
POTASSIUM SERPL-SCNC: 4.3 MMOL/L — SIGNIFICANT CHANGE UP (ref 3.5–5.3)
PROT SERPL-MCNC: 6.3 G/DL — SIGNIFICANT CHANGE UP (ref 6–8.3)
RBC # BLD: 4.28 M/UL — SIGNIFICANT CHANGE UP (ref 3.8–5.2)
RBC # FLD: 13.7 % — SIGNIFICANT CHANGE UP (ref 10.3–14.5)
SODIUM SERPL-SCNC: 139 MMOL/L — SIGNIFICANT CHANGE UP (ref 135–145)
SODIUM UR-SCNC: <20 MMOL/L — SIGNIFICANT CHANGE UP
VANCOMYCIN FLD-MCNC: 15.1 UG/ML — SIGNIFICANT CHANGE UP
WBC # BLD: 6.67 K/UL — SIGNIFICANT CHANGE UP (ref 3.8–10.5)
WBC # FLD AUTO: 6.67 K/UL — SIGNIFICANT CHANGE UP (ref 3.8–10.5)

## 2020-01-17 PROCEDURE — 99232 SBSQ HOSP IP/OBS MODERATE 35: CPT

## 2020-01-17 RX ORDER — SODIUM CHLORIDE 9 MG/ML
250 INJECTION INTRAMUSCULAR; INTRAVENOUS; SUBCUTANEOUS ONCE
Refills: 0 | Status: COMPLETED | OUTPATIENT
Start: 2020-01-17 | End: 2020-01-17

## 2020-01-17 RX ADMIN — MORPHINE SULFATE 1 MILLIGRAM(S): 50 CAPSULE, EXTENDED RELEASE ORAL at 18:03

## 2020-01-17 RX ADMIN — Medication 3 MILLILITER(S): at 12:29

## 2020-01-17 RX ADMIN — MORPHINE SULFATE 1 MILLIGRAM(S): 50 CAPSULE, EXTENDED RELEASE ORAL at 19:21

## 2020-01-17 RX ADMIN — Medication 3 MILLILITER(S): at 02:27

## 2020-01-17 RX ADMIN — IVABRADINE 5 MILLIGRAM(S): 7.5 TABLET, FILM COATED ORAL at 18:24

## 2020-01-17 RX ADMIN — Medication 75 MILLIGRAM(S): at 18:22

## 2020-01-17 RX ADMIN — ATORVASTATIN CALCIUM 40 MILLIGRAM(S): 80 TABLET, FILM COATED ORAL at 12:30

## 2020-01-17 RX ADMIN — Medication 30 MILLIGRAM(S): at 07:00

## 2020-01-17 RX ADMIN — Medication 3 MILLILITER(S): at 22:24

## 2020-01-17 RX ADMIN — IVABRADINE 5 MILLIGRAM(S): 7.5 TABLET, FILM COATED ORAL at 07:00

## 2020-01-17 RX ADMIN — Medication 3 MILLILITER(S): at 18:24

## 2020-01-17 RX ADMIN — Medication 100 MICROGRAM(S): at 07:00

## 2020-01-17 RX ADMIN — CEFTAROLINE FOSAMIL 50 MILLIGRAM(S): 600 POWDER, FOR SOLUTION INTRAVENOUS at 07:00

## 2020-01-17 RX ADMIN — GABAPENTIN 100 MILLIGRAM(S): 400 CAPSULE ORAL at 18:21

## 2020-01-17 RX ADMIN — Medication 0.12 MILLIGRAM(S): at 12:30

## 2020-01-17 RX ADMIN — Medication 3 MILLILITER(S): at 17:35

## 2020-01-17 RX ADMIN — SODIUM CHLORIDE 250 MILLILITER(S): 9 INJECTION INTRAMUSCULAR; INTRAVENOUS; SUBCUTANEOUS at 12:30

## 2020-01-17 RX ADMIN — CEFTAROLINE FOSAMIL 50 MILLIGRAM(S): 600 POWDER, FOR SOLUTION INTRAVENOUS at 18:23

## 2020-01-17 RX ADMIN — Medication 75 MILLIGRAM(S): at 07:00

## 2020-01-17 RX ADMIN — Medication 30 MILLIGRAM(S): at 18:22

## 2020-01-17 RX ADMIN — ENOXAPARIN SODIUM 30 MILLIGRAM(S): 100 INJECTION SUBCUTANEOUS at 18:21

## 2020-01-17 RX ADMIN — Medication 3 MILLILITER(S): at 12:30

## 2020-01-17 RX ADMIN — GABAPENTIN 100 MILLIGRAM(S): 400 CAPSULE ORAL at 07:00

## 2020-01-17 NOTE — PROGRESS NOTE ADULT - PROBLEM SELECTOR PLAN 1
Presented with 3/4 SIRS criteria, febrile 102F (rectal), tachypneic to 22, leukocytosis Wbc 21.03 on admission. Creatinine elevated from baseline of 0.9 with no elevated lacted. Fluids were held because patient was thought to be fluid-overloaded (crackles in the lungs and shortness of breath requiring BIPAP). S/p Solumedrol 120mg IV x1, Zosyn 3.375g IV x1, Vancomycin 1g IV x1. Duoneb x3 in the ED. Trops elevated likely in the setting of demand ischemia now peaked. CXR clear on admission, UA neg, influenza AH3 positive. Found to have new RLL infiltrate on CXR on 01/14. Procalcitonin 83 with new WBC 13.84.   - started on tamiflu 30mg BID (started on 01/13 with last day on 01/17)  - f/u urine cultures, currently growing E coli   - MRSA swab positive   - blood cultures positive for MRSA  - f/u on surveillance cultures, currently NGTD  - holding antihypertensives for now in the setting of sepsis, can restart appropriately when needed  - Stopped ceftriaxone 1 g IV daily   - stopped vancomycin;   - started ceftaroline 400 mg q 12 hrs (start date 01/16)     #MRSA bacteremia. See above  - follow-up on TTE   - cardiology following, appreciate recs

## 2020-01-17 NOTE — PROGRESS NOTE ADULT - PROBLEM SELECTOR PLAN 6
presenting with an elevated troponin of 0.07 in the setting of demand ischemia. EKG with no ST changes, LBBB unchanged from prior EKG. Trop peaked at 0.06. Had CP and SOB on 01/15 with elevated trop to 0.09 that peaked.   - No need to continue to trend     #?cardiac arrythmia  pt on digoxin 0.125mg every other day and ivabradine 5mg at home with meals. Unsure why. Will continue. Called cardiologist Dr. Jamaal Shaw for more information [phone]  -obtain more collateral

## 2020-01-17 NOTE — PROGRESS NOTE ADULT - PROBLEM SELECTOR PLAN 4
Previous echo on 02/2019 with EF of 25%. Presented with one day history of worsening shortness of breath, crackles on exam, BNP 40,975, which may possibly be CHF exacerbation secondary to the flu. Held the fluids given that she was volume overloaded requiring BIPAP. ABG pH 7.35 PO2 285. Was given IV lasix 20 mg in ED. Now off BiPAP on HFNC breathing comfortably.   - f/u TTE, possibly for 01/20  - in setting of worsening creatinine, will hold diuretics   - restarted home digoxin 0.125mg every other day  - On ivabradine 5mg twice a day with meals at home (cardiologist Dr. Jamaal Shaw [phone])  - holding home dose of coreg from 25mg BID   - holding home isosorbide mononitrate 30mg daily  - holding enalapril 10mg BID  - daily weights   - strict I+Os

## 2020-01-17 NOTE — PROGRESS NOTE ADULT - SUBJECTIVE AND OBJECTIVE BOX
Physical Medicine and Rehabilitation Progress Note:    Patient is a 88y old  Female who presents with a chief complaint of sepsis (17 Jan 2020 14:48)      HPI:  88F PMH HTN, HLD, and "leaky valves" presents with shortness of breath for one day. Most of the history is provided by patient's niece Pebbles Kan (HCP) at bedside. The niece reports that the patient was in [place] for 3 weeks and got back 4 days ago. During the time the patient was away, she hasn't been taking her medications compliantly because of her forgetfullness. She resumed her medications yesterday. Yesterday morning the patient started feeling short of breath and progressively got worse over time with her breathing becoming more labored. Her niece noticed that by the end of the day she was gasping for air and decided to bring her into the emergency room. The niece says that 4 days ago when she picked her up from the airport, the patient forgot her coat on the plane and was walking in the cold to her car without her coat. Otherwise, she denies any fevers, chills, night sweats, chest pain, abdominal pain, n/v/d/c, sick contacts. She lives with a friend at home and ambulates using a walker.     ED Course:  Vitals: T 102F (rectal) HR 90 /90 RR 22 O2 98% (nonrebreather)  Labs: Wbc 21.03 Hb 14.0 Plt 198 CO2 19 BUN/Cr 22/1.62 Trop 0.07 BNP 40,975 Alkphos 155  ALT 28   CXR Clear, UA neg, EKG LBBB unchanged from prior  Given Tylenol 650mg, Solumedrol 120mg IV x1, Zosyn 3.375g IV x1, Vancomycin 1g IV x1, Duoneb x3, placed on BIPAP (13 Jan 2020 16:04)                            12.6   6.67  )-----------( 129      ( 17 Jan 2020 05:38 )             40.1       01-17    139  |  106  |  51<H>  ----------------------------<  168<H>  4.3   |  16<L>  |  2.97<H>    Ca    7.7<L>      17 Jan 2020 05:38  Phos  4.8     01-17  Mg     2.5     01-17    TPro  6.3  /  Alb  3.1<L>  /  TBili  0.5  /  DBili  x   /  AST  144<H>  /  ALT  151<H>  /  AlkPhos  100  01-17    Vital Signs Last 24 Hrs  T(C): 36.3 (17 Jan 2020 12:37), Max: 36.8 (16 Jan 2020 20:45)  T(F): 97.3 (17 Jan 2020 12:37), Max: 98.2 (16 Jan 2020 20:45)  HR: 66 (17 Jan 2020 12:37) (66 - 146)  BP: 166/75 (17 Jan 2020 12:37) (93/64 - 166/75)  BP(mean): --  RR: 15 (17 Jan 2020 12:37) (14 - 20)  SpO2: 99% (17 Jan 2020 12:37) (96% - 99%)    MEDICATIONS  (STANDING):  albuterol/ipratropium for Nebulization 3 milliLiter(s) Nebulizer every 4 hours  atorvastatin 40 milliGRAM(s) Oral daily  ceftaroline fosamil IVPB 400 milliGRAM(s) IV Intermittent every 12 hours  digoxin     Tablet 0.125 milliGRAM(s) Oral every other day  enoxaparin Injectable 30 milliGRAM(s) SubCutaneous every 24 hours  gabapentin 100 milliGRAM(s) Oral every 12 hours  influenza   Vaccine 0.5 milliLiter(s) IntraMuscular once  ivabradine 5 milliGRAM(s) Oral two times a day  levothyroxine 100 MICROGram(s) Oral daily  oseltamivir 30 milliGRAM(s) Oral every 12 hours  venlafaxine 75 milliGRAM(s) Oral every 12 hours    MEDICATIONS  (PRN):  guaiFENesin   Syrup  (Sugar-Free) 100 milliGRAM(s) Oral every 6 hours PRN Cough  morphine  - Injectable 1 milliGRAM(s) IV Push every 4 hours PRN Breathlessness/respiratory distress    Currently Undergoing Physical Therapy at bedside.    Functional Status Assessment:      Pain Assessment/Number Scale (0-10) Adult  Presence of Pain: complains of pain/discomfort  Body Location: Right:   thigh    Therapeutic Interventions      Bed Mobility  Bed Mobility Training Supine-to-Sit: minimum assist (75% patient effort);  verbal cues;  1 person assist  Bed Mobility Training Limitations: decreased strength    Sit-Stand Transfer Training  Transfer Training Sit-to-Stand Transfer: minimum assist (75% patient effort);  verbal cues;  1 person assist;  full weight-bearing   HHA  Transfer Training Stand-to-Sit Transfer: minimum assist (75% patient effort);  verbal cues;  1 person assist;  full weight-bearing   HHA  Sit-to-Stand Transfer Training Transfer Safety Analysis: decreased balance;  decreased strength;  impaired balance    Gait Training  Gait Training: moderate assist (50% patient effort);  verbal cues;  2 person assist;  full weight-bearing   HHA;  5 feet  Gait Analysis: 3-point gait   decreased pj;  increased time in double stance;  decreased hip/knee flexion;  decreased step length;  decreased stride length;  retropulsion;  impaired balance;  5 feet;  HHA  Gait Number of Times:: x 1    Therapeutic Exercise  Therapeutic Exercise Detail: LAQs x 10             PM&R Impression: as above    Current Disposition Plan Recommendations: subacute rehab placement

## 2020-01-17 NOTE — PROGRESS NOTE ADULT - PROBLEM SELECTOR PLAN 5
presenting with cr 1.62 (baseline 0.9) etiology likely prerenal in the setting of severe sepsis due to flu. This AM creatinine worsened to 2.97. FENa 0.4 percent.   - avoid nephrotoxic agents  - hold ACE/ARB  - continue to trend CMP daily    #R/o ATN. Presented with NICOLÁS, but UOP   - continue to closely monitor UOP

## 2020-01-17 NOTE — PROGRESS NOTE ADULT - SUBJECTIVE AND OBJECTIVE BOX
INTERVAL HPI/OVERNIGHT EVENTS:    OVERNIGHT: No overnight events.   SUBJECTIVE: Patient seen and examined at bedside. Off bipap and now on high flow nasal cannula. Breahting well without extra work of breathing. Comfortable. No CP.       OBJECTIVE:    VITAL SIGNS:  ICU Vital Signs Last 24 Hrs  T(C): 36.3 (17 Jan 2020 12:37), Max: 36.9 (16 Jan 2020 15:29)  T(F): 97.3 (17 Jan 2020 12:37), Max: 98.5 (16 Jan 2020 15:29)  HR: 66 (17 Jan 2020 12:37) (66 - 147)  BP: 166/75 (17 Jan 2020 12:37) (93/64 - 174/99)  BP(mean): --  ABP: --  ABP(mean): --  RR: 15 (17 Jan 2020 12:37) (14 - 24)  SpO2: 99% (17 Jan 2020 12:37) (95% - 99%)        01-16 @ 07:01 - 01-17 @ 07:00  --------------------------------------------------------  IN: 0 mL / OUT: 350 mL / NET: -350 mL    01-17 @ 07:01  -  01-17 @ 14:49  --------------------------------------------------------  IN: 0 mL / OUT: 375 mL / NET: -375 mL      CAPILLARY BLOOD GLUCOSE      POCT Blood Glucose.: 119 mg/dL (16 Jan 2020 15:23)      PHYSICAL EXAM:  General: comfortable, NAD, on HFNC  HEENT: NCAT, PERRL, clear conjunctiva, no scleral icterus  Neck: supple, no JVD  Respiratory: rhonchi in all lung fields with no expiratory wheezes present   Cardiovascular: RRR, normal S1S2, no M/R/G  Vascular: 2+ radial and DP pulses  Abdomen: soft, NT/ND, bowel sounds in all four quadrants, no palpable masses  Extremities: WWP, no clubbing, cyanosis, or edema  Skin: No rashes present  Neuro: A&Ox1    MEDICATIONS:  MEDICATIONS  (STANDING):  albuterol/ipratropium for Nebulization 3 milliLiter(s) Nebulizer every 4 hours  atorvastatin 40 milliGRAM(s) Oral daily  ceftaroline fosamil IVPB 400 milliGRAM(s) IV Intermittent every 12 hours  digoxin     Tablet 0.125 milliGRAM(s) Oral every other day  enoxaparin Injectable 30 milliGRAM(s) SubCutaneous every 24 hours  gabapentin 100 milliGRAM(s) Oral every 12 hours  influenza   Vaccine 0.5 milliLiter(s) IntraMuscular once  ivabradine 5 milliGRAM(s) Oral two times a day  levothyroxine 100 MICROGram(s) Oral daily  oseltamivir 30 milliGRAM(s) Oral every 12 hours  venlafaxine 75 milliGRAM(s) Oral every 12 hours    MEDICATIONS  (PRN):  guaiFENesin   Syrup  (Sugar-Free) 100 milliGRAM(s) Oral every 6 hours PRN Cough  morphine  - Injectable 1 milliGRAM(s) IV Push every 4 hours PRN Breathlessness/respiratory distress      ALLERGIES:  Allergies    No Known Allergies    Intolerances        LABS:                        12.6   6.67  )-----------( 129      ( 17 Jan 2020 05:38 )             40.1     01-17    139  |  106  |  51<H>  ----------------------------<  168<H>  4.3   |  16<L>  |  2.97<H>    Ca    7.7<L>      17 Jan 2020 05:38  Phos  4.8     01-17  Mg     2.5     01-17    TPro  6.3  /  Alb  3.1<L>  /  TBili  0.5  /  DBili  x   /  AST  144<H>  /  ALT  151<H>  /  AlkPhos  100  01-17          RADIOLOGY & ADDITIONAL TESTS: Reviewed.

## 2020-01-17 NOTE — PROGRESS NOTE ADULT - PROBLEM SELECTOR PLAN 3
Patient has increased work of breathing intermittently when placed on NC. Now on BiPAP breathing comfortably. Resolving, patient initially required BiPapp, weaned to HFNC. Likely in setting of MRSA PNA and Flu

## 2020-01-17 NOTE — PROGRESS NOTE ADULT - SUBJECTIVE AND OBJECTIVE BOX
INTERVAL HPI/OVERNIGHT EVENTS:    OVERNIGHT: No overnight events.  SUBJECTIVE: Patient seen and examined at bedside. No active complaint. was on high flow for breathing. was comfortable.     REVIEW OF SYSTEMS:    All other review of systems is negative unless indicated above.      Allergies    No Known Allergies    Intolerances        ANTIBIOTICS/RELEVANT:  antimicrobials  ceftaroline fosamil IVPB 400 milliGRAM(s) IV Intermittent every 12 hours  oseltamivir 30 milliGRAM(s) Oral every 12 hours    immunologic:  influenza   Vaccine 0.5 milliLiter(s) IntraMuscular once    OTHER:  albuterol/ipratropium for Nebulization 3 milliLiter(s) Nebulizer every 4 hours  atorvastatin 40 milliGRAM(s) Oral daily  digoxin     Tablet 0.125 milliGRAM(s) Oral every other day  enoxaparin Injectable 30 milliGRAM(s) SubCutaneous every 24 hours  gabapentin 100 milliGRAM(s) Oral every 12 hours  guaiFENesin   Syrup  (Sugar-Free) 100 milliGRAM(s) Oral every 6 hours PRN  ivabradine 5 milliGRAM(s) Oral two times a day  levothyroxine 100 MICROGram(s) Oral daily  morphine  - Injectable 1 milliGRAM(s) IV Push every 4 hours PRN  venlafaxine 75 milliGRAM(s) Oral every 12 hours      Objective:  Vital Signs Last 24 Hrs  T(C): 36.7 (17 Jan 2020 05:20), Max: 37.9 (16 Jan 2020 13:31)  T(F): 98 (17 Jan 2020 05:20), Max: 100.2 (16 Jan 2020 13:31)  HR: 69 (17 Jan 2020 09:52) (67 - 147)  BP: 105/75 (17 Jan 2020 05:20) (93/64 - 174/99)  BP(mean): --  RR: 14 (17 Jan 2020 09:52) (14 - 24)  SpO2: 99% (17 Jan 2020 09:52) (95% - 99%)      PHYSICAL EXAM:    General: agitated, taking off bipap mask   HEENT: NCAT, PERRL, clear conjunctiva, no scleral icterus  Neck: supple, no JVD  Respiratory:  expiratory wheezes present (improved since yesterday)  Cardiovascular: tachycardic, regular rhythm, normal S1S2, no M/R/G  Vascular: 2+ radial and DP pulses  Abdomen: soft, NT/ND, bowel sounds in all four quadrants, no palpable masses  Extremities: WWP, no clubbing, cyanosis, or edema  Skin: No rashes present  Neuro: A&Ox1, agiatated but redirectable when family by bedside         LABS:                        12.6   6.67  )-----------( 129      ( 17 Jan 2020 05:38 )             40.1     01-17    139  |  106  |  51<H>  ----------------------------<  168<H>  4.3   |  16<L>  |  2.97<H>    Ca    7.7<L>      17 Jan 2020 05:38  Phos  4.8     01-17  Mg     2.5     01-17    TPro  6.3  /  Alb  3.1<L>  /  TBili  0.5  /  DBili  x   /  AST  144<H>  /  ALT  151<H>  /  AlkPhos  100  01-17          MICROBIOLOGY:  Culture Results:   No growth at 12 hours (01-16 @ 17:43)            RECENT CULTURES:  01-16 @ 17:43  .Blood Blood  --  --  --    No growth at 12 hours  --  01-14 @ 17:29  .Blood Blood  --  --  --    No growth at 2 days.  --  01-13 @ 15:06  .Blood Blood-Peripheral  Methicillin resistant Staphylococcus aureus  Methicillin resistant Staphylococcus aureus  Blood Culture PCR  Methicillin resistant Staphylococcus aureus  PCR    Growth in aerobic and anaerobic bottles: Methicillin resistant  Staphylococcus aureus  Floor previously notified.  --  01-13 @ 14:47  .Urine Clean Catch (Midstream)  Escherichia coli  Escherichia coli  LATHA    >100,000 CFU/ml Escherichia coli  --      RADIOLOGY & ADDITIONAL STUDIES:

## 2020-01-17 NOTE — PROGRESS NOTE ADULT - ASSESSMENT
89 yo F PMHx HTN, HLD, CHF (EF 20%) admitted with sepsis due to influenza, MRSA bacteremia, UTI.   Nephrology consulted for nonoliguric NICOLÁS.      # Nonoliguric NICOLÁS   - baseline Cr 0.9 in 9/2019, 1.62 on admission, now Cr plateaued  - likely due to sepsis-related hypoperfusion, hypoxia, poor oral intake/ dehydration, unclear use of NSAIDs  - maintain adequate oral intake, assist with feeds  - volume status and electrolytes acceptable   - consider  evaluation for urinary retention; bladder scan Q8hr, intermittent cath prn vs Rae cath placement  - avoid ACE/ARB/NSAIDs/PPIs  - renal diet, daily weight, in/out    # Infection   - MRSA bacteremia, PNA, Influenza, E coli UTI  - Vanco level 21.4, switched to ceftaroline  - ID on board   - renal dosing of antibiotics    Discussed with attending Dr Suresh.

## 2020-01-17 NOTE — PROGRESS NOTE ADULT - ASSESSMENT
88F PMH HTN, HLD, HFrEF (EF 25% from 02/2019), LBBB, pre-diabetes, RLS presents with shortness of breath for one day admitted for severe sepsis secondary to influenza, infiltrate on CXR, and MRSA bacteremia. Currently stable on HFNC.

## 2020-01-17 NOTE — PROGRESS NOTE ADULT - SUBJECTIVE AND OBJECTIVE BOX
on HFNC, FiO2 40%  Cr plateaued  Vanco level at 21.4, however switched to ceftaroline  bladder scan >300 cc, requiring intermittent cath   labs, images, chart reviewed       Meds:  albuterol/ipratropium for Nebulization 3 every 4 hours  atorvastatin 40 daily  ceftaroline fosamil IVPB 400 every 12 hours  digoxin     Tablet 0.125 every other day  enoxaparin Injectable 30 every 24 hours  gabapentin 100 every 12 hours  guaiFENesin   Syrup  (Sugar-Free) 100 every 6 hours PRN  influenza   Vaccine 0.5 once  ivabradine 5 two times a day  levothyroxine 100 daily  morphine  - Injectable 1 every 4 hours PRN  oseltamivir 30 every 12 hours  sodium chloride 0.9% Bolus 250 once  venlafaxine 75 every 12 hours      T(C): , Max: 37.9 (01-16-20 @ 13:31)  T(F): , Max: 100.2 (01-16-20 @ 13:31)  HR: 69 (01-17-20 @ 09:52)  BP: 105/75 (01-17-20 @ 05:20)  BP(mean): --  RR: 14 (01-17-20 @ 09:52)  SpO2: 99% (01-17-20 @ 09:52)  Wt(kg): --    01-16 @ 07:01  -  01-17 @ 07:00  --------------------------------------------------------  IN: 0 mL / OUT: 350 mL / NET: -350 mL    01-17 @ 07:01  -  01-17 @ 11:49  --------------------------------------------------------  IN: 0 mL / OUT: 375 mL / NET: -375 mL        Weight (kg): 46.4 (01-16 @ 13:49)      PHYSICAL EXAM:  GENERAL: alert, no acute distress at present, on HFNC   NECK: supple, No JVD  CHEST/LUNG: diffuse inspiratory/ expiratory wheezes/ crackles   HEART: normal S1S2, RRR  ABDOMEN: Soft, Nontender, +BS, No flank tenderness  EXTREMITIES: No clubbing, cyanosis, or edema, no calf tenderness bilateral  Neurology: oriented to person, no focal neurological deficit, ?underlying dementia   SKIN: No rashes      LABS:                        12.6   6.67  )-----------( 129      ( 17 Jan 2020 05:38 )             40.1     01-17    139  |  106  |  51<H>  ----------------------------<  168<H>  4.3   |  16<L>  |  2.97<H>    Ca    7.7<L>      17 Jan 2020 05:38  Phos  4.8     01-17  Mg     2.5     01-17    TPro  6.3  /  Alb  3.1<L>  /  TBili  0.5  /  DBili  x   /  AST  144<H>  /  ALT  151<H>  /  AlkPhos  100  01-17          Creatinine, Random Urine: 97 mg/dL (01-17 @ 10:05)  Sodium, Random Urine: <20 mmol/L (01-17 @ 10:05)  Osmolality, Random Urine: 372 mosmol/kg (01-17 @ 10:04)        RADIOLOGY & ADDITIONAL STUDIES:    < from: Xray Chest 1 View- PORTABLE-Urgent (01.16.20 @ 12:59) >    EXAM:  XR CHEST PORTABLE URGENT 1V                          PROCEDURE DATE:  01/16/2020          INTERPRETATION:  Clinical History: Worsening lung exam    Portable exam of the chest demonstrates cardiomegaly. Right basilar infiltrate. Dextroscoliosis thoracic spine.    Impression: Right basilar infiltrate      < end of copied text >

## 2020-01-17 NOTE — PROGRESS NOTE ADULT - ASSESSMENT
88F PMH HTN, HLD, and "leaky valves" presents with shortness of breath for one day. The niece reports that the patient was in [place] for 3 weeks and got back 4 days ago. the patient started feeling short of breath and progressively got worse over time with her breathing becoming more labored.   Vitals: T 102F (rectal) HR 90 /90 RR 22 O2, Labs: Wbc 21.03, CXR Clear, UA neg,  Flu A was positive. was started on Tamiflu. Blood culture was positive for MRSA and was started on vancomycin.     - Please c/w ceftaroline 400 q12   - please obtain a DK when flu cleared   - Isolation can be terminated tomorrow as per epidemiology    Plan discussed with Dr. Marinelli and primary team.

## 2020-01-18 LAB
ALBUMIN SERPL ELPH-MCNC: 3.3 G/DL — SIGNIFICANT CHANGE UP (ref 3.3–5)
ALP SERPL-CCNC: 99 U/L — SIGNIFICANT CHANGE UP (ref 40–120)
ALT FLD-CCNC: 125 U/L — HIGH (ref 10–45)
ANION GAP SERPL CALC-SCNC: 15 MMOL/L — SIGNIFICANT CHANGE UP (ref 5–17)
AST SERPL-CCNC: 100 U/L — HIGH (ref 10–40)
BILIRUB SERPL-MCNC: 0.5 MG/DL — SIGNIFICANT CHANGE UP (ref 0.2–1.2)
BUN SERPL-MCNC: 53 MG/DL — HIGH (ref 7–23)
CALCIUM SERPL-MCNC: 8.2 MG/DL — LOW (ref 8.4–10.5)
CHLORIDE SERPL-SCNC: 108 MMOL/L — SIGNIFICANT CHANGE UP (ref 96–108)
CO2 SERPL-SCNC: 17 MMOL/L — LOW (ref 22–31)
CREAT SERPL-MCNC: 2.95 MG/DL — HIGH (ref 0.5–1.3)
CULTURE RESULTS: SIGNIFICANT CHANGE UP
GLUCOSE SERPL-MCNC: 120 MG/DL — HIGH (ref 70–99)
HCT VFR BLD CALC: 40.7 % — SIGNIFICANT CHANGE UP (ref 34.5–45)
HGB BLD-MCNC: 12.4 G/DL — SIGNIFICANT CHANGE UP (ref 11.5–15.5)
MAGNESIUM SERPL-MCNC: 2.8 MG/DL — HIGH (ref 1.6–2.6)
MCHC RBC-ENTMCNC: 29.2 PG — SIGNIFICANT CHANGE UP (ref 27–34)
MCHC RBC-ENTMCNC: 30.5 GM/DL — LOW (ref 32–36)
MCV RBC AUTO: 95.8 FL — SIGNIFICANT CHANGE UP (ref 80–100)
NRBC # BLD: 0 /100 WBCS — SIGNIFICANT CHANGE UP (ref 0–0)
PHOSPHATE SERPL-MCNC: 4 MG/DL — SIGNIFICANT CHANGE UP (ref 2.5–4.5)
PLATELET # BLD AUTO: 146 K/UL — LOW (ref 150–400)
POTASSIUM SERPL-MCNC: 4.4 MMOL/L — SIGNIFICANT CHANGE UP (ref 3.5–5.3)
POTASSIUM SERPL-SCNC: 4.4 MMOL/L — SIGNIFICANT CHANGE UP (ref 3.5–5.3)
PROT SERPL-MCNC: 6.8 G/DL — SIGNIFICANT CHANGE UP (ref 6–8.3)
RBC # BLD: 4.25 M/UL — SIGNIFICANT CHANGE UP (ref 3.8–5.2)
RBC # FLD: 13.4 % — SIGNIFICANT CHANGE UP (ref 10.3–14.5)
SODIUM SERPL-SCNC: 140 MMOL/L — SIGNIFICANT CHANGE UP (ref 135–145)
SPECIMEN SOURCE: SIGNIFICANT CHANGE UP
WBC # BLD: 9.72 K/UL — SIGNIFICANT CHANGE UP (ref 3.8–10.5)
WBC # FLD AUTO: 9.72 K/UL — SIGNIFICANT CHANGE UP (ref 3.8–10.5)

## 2020-01-18 PROCEDURE — 99232 SBSQ HOSP IP/OBS MODERATE 35: CPT

## 2020-01-18 PROCEDURE — 99233 SBSQ HOSP IP/OBS HIGH 50: CPT | Mod: GC

## 2020-01-18 RX ADMIN — ENOXAPARIN SODIUM 30 MILLIGRAM(S): 100 INJECTION SUBCUTANEOUS at 17:42

## 2020-01-18 RX ADMIN — Medication 75 MILLIGRAM(S): at 17:41

## 2020-01-18 RX ADMIN — CEFTAROLINE FOSAMIL 50 MILLIGRAM(S): 600 POWDER, FOR SOLUTION INTRAVENOUS at 17:41

## 2020-01-18 RX ADMIN — CEFTAROLINE FOSAMIL 50 MILLIGRAM(S): 600 POWDER, FOR SOLUTION INTRAVENOUS at 05:22

## 2020-01-18 RX ADMIN — Medication 3 MILLILITER(S): at 11:21

## 2020-01-18 RX ADMIN — Medication 3 MILLILITER(S): at 22:24

## 2020-01-18 RX ADMIN — Medication 3 MILLILITER(S): at 19:46

## 2020-01-18 RX ADMIN — Medication 3 MILLILITER(S): at 05:22

## 2020-01-18 RX ADMIN — GABAPENTIN 100 MILLIGRAM(S): 400 CAPSULE ORAL at 17:41

## 2020-01-18 RX ADMIN — IVABRADINE 5 MILLIGRAM(S): 7.5 TABLET, FILM COATED ORAL at 05:21

## 2020-01-18 RX ADMIN — IVABRADINE 5 MILLIGRAM(S): 7.5 TABLET, FILM COATED ORAL at 17:41

## 2020-01-18 RX ADMIN — Medication 3 MILLILITER(S): at 14:29

## 2020-01-18 RX ADMIN — GABAPENTIN 100 MILLIGRAM(S): 400 CAPSULE ORAL at 05:21

## 2020-01-18 RX ADMIN — ATORVASTATIN CALCIUM 40 MILLIGRAM(S): 80 TABLET, FILM COATED ORAL at 19:46

## 2020-01-18 RX ADMIN — Medication 75 MILLIGRAM(S): at 05:21

## 2020-01-18 RX ADMIN — Medication 30 MILLIGRAM(S): at 05:21

## 2020-01-18 RX ADMIN — Medication 30 MILLIGRAM(S): at 17:40

## 2020-01-18 RX ADMIN — Medication 100 MICROGRAM(S): at 05:22

## 2020-01-18 RX ADMIN — Medication 3 MILLILITER(S): at 04:16

## 2020-01-18 NOTE — PROGRESS NOTE ADULT - SUBJECTIVE AND OBJECTIVE BOX
INTERVAL HPI/OVERNIGHT EVENTS:    OVERNIGHT: No overnight events.  SUBJECTIVE: Patient seen and examined at bedside. On nasal cannula and breathing comfortably. No chest pain.    OBJECTIVE:    VITAL SIGNS:  ICU Vital Signs Last 24 Hrs  T(C): 36.4 (18 Jan 2020 05:41), Max: 36.4 (17 Jan 2020 20:57)  T(F): 97.6 (18 Jan 2020 05:41), Max: 97.6 (18 Jan 2020 05:41)  HR: 77 (18 Jan 2020 05:41) (61 - 84)  BP: 144/69 (18 Jan 2020 05:41) (127/70 - 166/75)  BP(mean): --  ABP: --  ABP(mean): --  RR: 19 (18 Jan 2020 05:41) (14 - 20)  SpO2: 100% (18 Jan 2020 05:41) (95% - 100%)        01-17 @ 07:01  -  01-18 @ 07:00  --------------------------------------------------------  IN: 100 mL / OUT: 450 mL / NET: -350 mL      CAPILLARY BLOOD GLUCOSE      POCT Blood Glucose.: 119 mg/dL (16 Jan 2020 15:23)      PHYSICAL EXAM:  General: comfortable, NAD, on HFNC  HEENT: NCAT, PERRL, clear conjunctiva, no scleral icterus  Neck: supple, no JVD  Respiratory: rhonchi in all lung fields with no expiratory wheezes present   Cardiovascular: RRR, normal S1S2, no M/R/G  Vascular: 2+ radial and DP pulses  Abdomen: soft, NT/ND, bowel sounds in all four quadrants, no palpable masses  Extremities: WWP, no clubbing, cyanosis, or edema  Skin: No rashes present  Neuro: A&Ox1    MEDICATIONS:  MEDICATIONS  (STANDING):  albuterol/ipratropium for Nebulization 3 milliLiter(s) Nebulizer every 4 hours  atorvastatin 40 milliGRAM(s) Oral daily  ceftaroline fosamil IVPB 400 milliGRAM(s) IV Intermittent every 12 hours  digoxin     Tablet 0.125 milliGRAM(s) Oral every other day  enoxaparin Injectable 30 milliGRAM(s) SubCutaneous every 24 hours  gabapentin 100 milliGRAM(s) Oral every 12 hours  influenza   Vaccine 0.5 milliLiter(s) IntraMuscular once  ivabradine 5 milliGRAM(s) Oral two times a day  levothyroxine 100 MICROGram(s) Oral daily  oseltamivir 30 milliGRAM(s) Oral every 12 hours  venlafaxine 75 milliGRAM(s) Oral every 12 hours    MEDICATIONS  (PRN):  guaiFENesin   Syrup  (Sugar-Free) 100 milliGRAM(s) Oral every 6 hours PRN Cough  morphine  - Injectable 1 milliGRAM(s) IV Push every 4 hours PRN Breathlessness/respiratory distress      ALLERGIES:  Allergies    No Known Allergies    Intolerances        LABS:                        12.4   9.72  )-----------( 146      ( 18 Jan 2020 06:06 )             40.7     01-18    140  |  108  |  53<H>  ----------------------------<  120<H>  4.4   |  17<L>  |  2.95<H>    Ca    8.2<L>      18 Jan 2020 06:06  Phos  4.0     01-18  Mg     2.8     01-18    TPro  6.8  /  Alb  3.3  /  TBili  0.5  /  DBili  x   /  AST  100<H>  /  ALT  125<H>  /  AlkPhos  99  01-18          RADIOLOGY & ADDITIONAL TESTS: Reviewed.

## 2020-01-18 NOTE — PROGRESS NOTE ADULT - SUBJECTIVE AND OBJECTIVE BOX
Patient is a 88y Female seen and evaluated at bedside  no acute events overnight  BUN/Cr plateaued at 53/2.9      Meds:  albuterol/ipratropium for Nebulization 3 every 4 hours  atorvastatin 40 daily  ceftaroline fosamil IVPB 400 every 12 hours  digoxin     Tablet 0.125 every other day  enoxaparin Injectable 30 every 24 hours  gabapentin 100 every 12 hours  guaiFENesin   Syrup  (Sugar-Free) 100 every 6 hours PRN  influenza   Vaccine 0.5 once  ivabradine 5 two times a day  levothyroxine 100 daily  morphine  - Injectable 1 every 4 hours PRN  oseltamivir 30 every 12 hours  venlafaxine 75 every 12 hours      T(C): , Max: 36.6 (01-18-20 @ 13:07)  T(F): , Max: 97.8 (01-18-20 @ 13:07)  HR: 68 (01-18-20 @ 13:07)  BP: 140/72 (01-18-20 @ 13:07)  BP(mean): --  RR: 18 (01-18-20 @ 13:07)  SpO2: 96% (01-18-20 @ 13:07)  Wt(kg): --    01-17 @ 07:01  -  01-18 @ 07:00  --------------------------------------------------------  IN: 100 mL / OUT: 450 mL / NET: -350 mL          PHYSICAL EXAM:  GENERAL: alert, no acute distress at present, on HFNC   NECK: supple, No JVD  CHEST/LUNG: diffuse expiratory wheezes  HEART: normal S1S2, RRR  ABDOMEN: Soft, Nontender, +BS, No flank tenderness  EXTREMITIES: No clubbing, cyanosis, or edema, no calf tenderness bilateral  Neurology: oriented to person, no focal neurological deficit, ?underlying dementia   SKIN: No rashes      LABS:                        12.4   9.72  )-----------( 146      ( 18 Jan 2020 06:06 )             40.7     01-18    140  |  108  |  53<H>  ----------------------------<  120<H>  4.4   |  17<L>  |  2.95<H>    Ca    8.2<L>      18 Jan 2020 06:06  Phos  4.0     01-18  Mg     2.8     01-18    TPro  6.8  /  Alb  3.3  /  TBili  0.5  /  DBili  x   /  AST  100<H>  /  ALT  125<H>  /  AlkPhos  99  01-18          Creatinine, Random Urine: 97 mg/dL (01-17 @ 10:05)  Sodium, Random Urine: <20 mmol/L (01-17 @ 10:05)  Osmolality, Random Urine: 372 mosmol/kg (01-17 @ 10:04)

## 2020-01-18 NOTE — PROGRESS NOTE ADULT - PROBLEM SELECTOR PLAN 6
presenting with an elevated troponin of 0.07 in the setting of demand ischemia. EKG with no ST changes, LBBB unchanged from prior EKG. Trop peaked at 0.06. Had CP and SOB on 01/15 with elevated trop to 0.09 that peaked.   - No need to continue to trend     #?cardiac arrythmia  pt on digoxin 0.125mg every other day and ivabradine 5mg at home with meals. Unsure why. Will continue. Called cardiologist Dr. Jamaal Shaw for more information [phone]  -obtain more collateral

## 2020-01-18 NOTE — PROGRESS NOTE ADULT - ASSESSMENT
87 yo F PMHx HTN, HLD, CHF (EF 20%) admitted with sepsis due to influenza, MRSA bacteremia, UTI.   Nephrology consulted for nonoliguric NICOLÁS.      # Nonoliguric NICOLÁS   - baseline Cr 0.9 in 9/2019, 1.62 on admission, now Cr plateaued at 2.9  - likely due to sepsis-related hypoperfusion, hypoxia, poor oral intake/ dehydration, unclear use of NSAIDs  - maintain adequate oral intake, assist with feeds  - volume status and electrolytes acceptable   - consider  evaluation for urinary retention; bladder scan Q8hr, intermittent cath prn vs Rae cath placement  - avoid ACE/ARB/NSAIDs/PPIs  - renal diet, daily weight, in/out    # Infection   - MRSA bacteremia, PNA, Influenza, E coli UTI  - Vanco level 21.4, switched to ceftaroline  - ID on board   - renal dosing of antibiotics

## 2020-01-18 NOTE — PROGRESS NOTE ADULT - ASSESSMENT
88F PMH HTN, HLD, HFrEF (EF 25% from 02/2019), LBBB, pre-diabetes, RLS presents with shortness of breath for one day admitted for severe sepsis secondary to influenza, infiltrate on CXR, and MRSA bacteremia. Currently stable on nasal cannula and breathing comfortably.

## 2020-01-18 NOTE — PROGRESS NOTE ADULT - PROBLEM SELECTOR PLAN 1
Presented with 3/4 SIRS criteria, febrile 102F (rectal), tachypneic to 22, leukocytosis Wbc 21.03 on admission. Creatinine elevated from baseline of 0.9 with no elevated lacted. Fluids were held because patient was thought to be fluid-overloaded (crackles in the lungs and shortness of breath requiring BIPAP). S/p Solumedrol 120mg IV x1, Zosyn 3.375g IV x1, Vancomycin 1g IV x1. Duoneb x3 in the ED. Trops elevated likely in the setting of demand ischemia now peaked. CXR clear on admission, UA neg, influenza AH3 positive. Found to have new RLL infiltrate on CXR on 01/14. Procalcitonin 83 with new WBC 13.84.   - s/p on tamiflu 30mg BID (started on 01/13 with last day on 01/17)  - f/u urine cultures, currently growing E coli   - MRSA swab positive   - blood cultures positive for MRSA  - f/u on surveillance cultures, currently NGTD  - holding antihypertensives for now in the setting of sepsis, can restart appropriately when needed  - Stopped ceftriaxone 1 g IV daily   - stopped vancomycin  - started ceftaroline 400 mg q 12 hrs (start date 01/16)     #MRSA bacteremia. See above  - follow-up on TTE   - cardiology following, appreciate recs

## 2020-01-18 NOTE — PROGRESS NOTE ADULT - PROBLEM SELECTOR PLAN 4
Previous echo on 02/2019 with EF of 25%. Presented with one day history of worsening shortness of breath, crackles on exam, BNP 40,975, which may possibly be CHF exacerbation secondary to the flu. Held the fluids given that she was volume overloaded requiring BIPAP. ABG pH 7.35 PO2 285. Was given IV lasix 20 mg in ED. Now off BiPAP on HFNC breathing comfortably.   - f/u TTE, possibly for 01/20  - in setting of worsening creatinine, will hold diuretics   - restarted home digoxin 0.125mg every other day  - On ivabradine 5mg twice a day with meals at home (cardiologist Dr. Jamaal Shaw [phone])  - holding home dose of coreg from 25mg BID   - holding home isosorbide mononitrate 30mg daily  - holding enalapril 10mg BID  - daily weights   - strict I+Os

## 2020-01-19 LAB
ALBUMIN SERPL ELPH-MCNC: 3 G/DL — LOW (ref 3.3–5)
ALP SERPL-CCNC: 103 U/L — SIGNIFICANT CHANGE UP (ref 40–120)
ALT FLD-CCNC: 104 U/L — HIGH (ref 10–45)
ANION GAP SERPL CALC-SCNC: 13 MMOL/L — SIGNIFICANT CHANGE UP (ref 5–17)
AST SERPL-CCNC: 82 U/L — HIGH (ref 10–40)
BILIRUB SERPL-MCNC: 0.5 MG/DL — SIGNIFICANT CHANGE UP (ref 0.2–1.2)
BUN SERPL-MCNC: 45 MG/DL — HIGH (ref 7–23)
CALCIUM SERPL-MCNC: 8.5 MG/DL — SIGNIFICANT CHANGE UP (ref 8.4–10.5)
CHLORIDE SERPL-SCNC: 109 MMOL/L — HIGH (ref 96–108)
CO2 SERPL-SCNC: 19 MMOL/L — LOW (ref 22–31)
CREAT SERPL-MCNC: 2.38 MG/DL — HIGH (ref 0.5–1.3)
CULTURE RESULTS: SIGNIFICANT CHANGE UP
GLUCOSE SERPL-MCNC: 116 MG/DL — HIGH (ref 70–99)
HCT VFR BLD CALC: 38.5 % — SIGNIFICANT CHANGE UP (ref 34.5–45)
HGB BLD-MCNC: 12.1 G/DL — SIGNIFICANT CHANGE UP (ref 11.5–15.5)
MAGNESIUM SERPL-MCNC: 2.4 MG/DL — SIGNIFICANT CHANGE UP (ref 1.6–2.6)
MCHC RBC-ENTMCNC: 29.6 PG — SIGNIFICANT CHANGE UP (ref 27–34)
MCHC RBC-ENTMCNC: 31.4 GM/DL — LOW (ref 32–36)
MCV RBC AUTO: 94.1 FL — SIGNIFICANT CHANGE UP (ref 80–100)
NRBC # BLD: 0 /100 WBCS — SIGNIFICANT CHANGE UP (ref 0–0)
PHOSPHATE SERPL-MCNC: 3.2 MG/DL — SIGNIFICANT CHANGE UP (ref 2.5–4.5)
PLATELET # BLD AUTO: 160 K/UL — SIGNIFICANT CHANGE UP (ref 150–400)
POTASSIUM SERPL-MCNC: 3.9 MMOL/L — SIGNIFICANT CHANGE UP (ref 3.5–5.3)
POTASSIUM SERPL-SCNC: 3.9 MMOL/L — SIGNIFICANT CHANGE UP (ref 3.5–5.3)
PROT SERPL-MCNC: 6.2 G/DL — SIGNIFICANT CHANGE UP (ref 6–8.3)
RBC # BLD: 4.09 M/UL — SIGNIFICANT CHANGE UP (ref 3.8–5.2)
RBC # FLD: 13.3 % — SIGNIFICANT CHANGE UP (ref 10.3–14.5)
SODIUM SERPL-SCNC: 141 MMOL/L — SIGNIFICANT CHANGE UP (ref 135–145)
SPECIMEN SOURCE: SIGNIFICANT CHANGE UP
WBC # BLD: 7.6 K/UL — SIGNIFICANT CHANGE UP (ref 3.8–10.5)
WBC # FLD AUTO: 7.6 K/UL — SIGNIFICANT CHANGE UP (ref 3.8–10.5)

## 2020-01-19 PROCEDURE — 99233 SBSQ HOSP IP/OBS HIGH 50: CPT

## 2020-01-19 PROCEDURE — 99232 SBSQ HOSP IP/OBS MODERATE 35: CPT

## 2020-01-19 RX ORDER — POTASSIUM CHLORIDE 20 MEQ
10 PACKET (EA) ORAL ONCE
Refills: 0 | Status: COMPLETED | OUTPATIENT
Start: 2020-01-19 | End: 2020-01-19

## 2020-01-19 RX ADMIN — Medication 3 MILLILITER(S): at 04:03

## 2020-01-19 RX ADMIN — Medication 3 MILLILITER(S): at 06:34

## 2020-01-19 RX ADMIN — Medication 0.12 MILLIGRAM(S): at 11:26

## 2020-01-19 RX ADMIN — GABAPENTIN 100 MILLIGRAM(S): 400 CAPSULE ORAL at 06:34

## 2020-01-19 RX ADMIN — ENOXAPARIN SODIUM 30 MILLIGRAM(S): 100 INJECTION SUBCUTANEOUS at 17:29

## 2020-01-19 RX ADMIN — IVABRADINE 5 MILLIGRAM(S): 7.5 TABLET, FILM COATED ORAL at 06:34

## 2020-01-19 RX ADMIN — Medication 100 MICROGRAM(S): at 06:34

## 2020-01-19 RX ADMIN — Medication 30 MILLIGRAM(S): at 06:34

## 2020-01-19 RX ADMIN — Medication 3 MILLILITER(S): at 15:16

## 2020-01-19 RX ADMIN — GABAPENTIN 100 MILLIGRAM(S): 400 CAPSULE ORAL at 17:00

## 2020-01-19 RX ADMIN — IVABRADINE 5 MILLIGRAM(S): 7.5 TABLET, FILM COATED ORAL at 17:00

## 2020-01-19 RX ADMIN — Medication 10 MILLIEQUIVALENT(S): at 09:26

## 2020-01-19 RX ADMIN — CEFTAROLINE FOSAMIL 50 MILLIGRAM(S): 600 POWDER, FOR SOLUTION INTRAVENOUS at 06:33

## 2020-01-19 RX ADMIN — ATORVASTATIN CALCIUM 40 MILLIGRAM(S): 80 TABLET, FILM COATED ORAL at 21:42

## 2020-01-19 RX ADMIN — Medication 75 MILLIGRAM(S): at 17:00

## 2020-01-19 RX ADMIN — Medication 75 MILLIGRAM(S): at 06:34

## 2020-01-19 RX ADMIN — Medication 3 MILLILITER(S): at 23:06

## 2020-01-19 RX ADMIN — CEFTAROLINE FOSAMIL 50 MILLIGRAM(S): 600 POWDER, FOR SOLUTION INTRAVENOUS at 17:29

## 2020-01-19 RX ADMIN — Medication 3 MILLILITER(S): at 10:17

## 2020-01-19 NOTE — PROGRESS NOTE ADULT - SUBJECTIVE AND OBJECTIVE BOX
Patient is a 88y old  Female who presents with a chief complaint of sepsis (18 Jan 2020 16:47)      INTERVAL HPI/OVERNIGHT EVENTS:  No complaints, feeling better. Decreased appetite.    Review of Systems: 12 point review of systems otherwise negative    MEDICATIONS  (STANDING):  albuterol/ipratropium for Nebulization 3 milliLiter(s) Nebulizer every 4 hours  atorvastatin 40 milliGRAM(s) Oral daily  ceftaroline fosamil IVPB 400 milliGRAM(s) IV Intermittent every 12 hours  digoxin     Tablet 0.125 milliGRAM(s) Oral every other day  enoxaparin Injectable 30 milliGRAM(s) SubCutaneous every 24 hours  gabapentin 100 milliGRAM(s) Oral every 12 hours  influenza   Vaccine 0.5 milliLiter(s) IntraMuscular once  ivabradine 5 milliGRAM(s) Oral two times a day  levothyroxine 100 MICROGram(s) Oral daily  venlafaxine 75 milliGRAM(s) Oral every 12 hours    MEDICATIONS  (PRN):  guaiFENesin   Syrup  (Sugar-Free) 100 milliGRAM(s) Oral every 6 hours PRN Cough  morphine  - Injectable 1 milliGRAM(s) IV Push every 4 hours PRN Breathlessness/respiratory distress      Allergies    No Known Allergies    Intolerances          Vital Signs Last 24 Hrs  T(C): 37.2 (19 Jan 2020 12:34), Max: 37.2 (19 Jan 2020 12:34)  T(F): 99 (19 Jan 2020 12:34), Max: 99 (19 Jan 2020 12:34)  HR: 74 (19 Jan 2020 15:38) (70 - 76)  BP: 156/66 (19 Jan 2020 12:34) (156/66 - 174/75)  BP(mean): --  RR: 18 (19 Jan 2020 12:34) (18 - 20)  SpO2: 98% (19 Jan 2020 15:38) (93% - 98%)  CAPILLARY BLOOD GLUCOSE          01-18 @ 07:01  -  01-19 @ 07:00  --------------------------------------------------------  IN: 0 mL / OUT: 401 mL / NET: -401 mL        Physical Exam:    Daily     Daily   General:  Well appearing, NAD, not cachetic  HEENT:  Nonicteric, PERRLA  CV:  RRR  Lungs:  Decreased BS B/L, occasional rhonchi  Abdomen:  Soft, non-tender, no distended, positive BS, no hepatosplenomegaly  Extremities:  2+ pulses, no c/c, no edema  Skin:  Warm and dry, no rashes  :  No chou  Neuro:  AAOx2-3, non-focal, CN II-XII grossly intact  No Restraints    LABS:                        12.1   7.60  )-----------( 160      ( 19 Jan 2020 05:39 )             38.5     01-19    141  |  109<H>  |  45<H>  ----------------------------<  116<H>  3.9   |  19<L>  |  2.38<H>    Ca    8.5      19 Jan 2020 05:39  Phos  3.2     01-19  Mg     2.4     01-19    TPro  6.2  /  Alb  3.0<L>  /  TBili  0.5  /  DBili  x   /  AST  82<H>  /  ALT  104<H>  /  AlkPhos  103  01-19            RADIOLOGY & ADDITIONAL TESTS:

## 2020-01-19 NOTE — PROGRESS NOTE ADULT - ASSESSMENT
89 yo F PMHx HTN, HLD, CHF (EF 20%) admitted with sepsis due to influenza, MRSA bacteremia, UTI.   Nephrology consulted for nonoliguric NICOLÁS.      # Nonoliguric NICOLÁS   - baseline Cr 0.9 in 9/2019, 1.62 on admission, trending down to 2.3 from 2.9  - likely due to sepsis-related hypoperfusion, hypoxia, poor oral intake/ dehydration, unclear use of NSAIDs  - maintain adequate hydration/ encourage oral intake/ assist with feeds  - volume status and electrolytes acceptable   - avoid ACE/ARB/NSAIDs/PPIs  - renal diet, daily weight, in/out

## 2020-01-19 NOTE — PROGRESS NOTE ADULT - ASSESSMENT
88 year old female with,    1. Severe sepsis-POA due to MRSA pneumonia and MRSA bacteremia, clinically improving, c/w ceftaroline. Will obtain DK now that patient is off isolation.  Apprec ID recs.  2. Influenza: completed tamiflu treatment, off isolation.  3. Acute hypoxemic respiratory failure: improved, c/w BIPAP at night, NC 3 lts/day-wean off as tolerated  3. Chronic systolic CHF: c/w meds  4. Hypothyroidism: c/w synthroid  5. Moderate protein calorie malnutrition 88 year old female with,    1. Severe sepsis-POA due to MRSA pneumonia and MRSA bacteremia, clinically improving, c/w ceftaroline. Will obtain DK now that patient is off isolation.  Apprec ID recs.  2. Influenza: completed tamiflu treatment, off isolation.  3. Acute hypoxemic respiratory failure: improved, c/w BIPAP at night, NC 3 lts/day-wean off as tolerated  4. Chronic systolic CHF: c/w meds  5. Hypothyroidism: c/w synthroid  6. Moderate protein calorie malnutrition 88 year old female with,    1. Severe sepsis-POA due to MRSA pneumonia and MRSA bacteremia, clinically improving, c/w ceftaroline. Will obtain DK now that patient is off isolation.  Apprec ID recs.  2. Influenza: completed tamiflu treatment, off isolation.  3. Acute hypoxemic respiratory failure: improved, c/w BIPAP at night, NC 3 lts/day-wean off as tolerated  4. NICOLÁS: improving, apprec Renal recs  5. Chronic systolic CHF: c/w meds  6. Hypothyroidism: c/w synthroid  7. Moderate protein calorie malnutrition

## 2020-01-19 NOTE — PROGRESS NOTE ADULT - SUBJECTIVE AND OBJECTIVE BOX
no acute events overnight  stable on 3L NC, sat 98%, not in any distress  Cr trending down to 2.3 from 2.9      Meds:  albuterol/ipratropium for Nebulization 3 every 4 hours  atorvastatin 40 daily  ceftaroline fosamil IVPB 400 every 12 hours  digoxin     Tablet 0.125 every other day  enoxaparin Injectable 30 every 24 hours  gabapentin 100 every 12 hours  guaiFENesin   Syrup  (Sugar-Free) 100 every 6 hours PRN  influenza   Vaccine 0.5 once  ivabradine 5 two times a day  levothyroxine 100 daily  morphine  - Injectable 1 every 4 hours PRN  venlafaxine 75 every 12 hours      T(C): , Max: 37.2 (01-19-20 @ 12:34)  T(F): , Max: 99 (01-19-20 @ 12:34)  HR: 74 (01-19-20 @ 15:38)  BP: 156/66 (01-19-20 @ 12:34)  BP(mean): --  RR: 18 (01-19-20 @ 12:34)  SpO2: 98% (01-19-20 @ 15:38)  Wt(kg): --    01-18 @ 07:01  -  01-19 @ 07:00  --------------------------------------------------------  IN: 0 mL / OUT: 401 mL / NET: -401 mL          PHYSICAL EXAM:  GENERAL: alert, no acute distress at present, on 3L NC   NECK: supple, No JVD  CHEST/LUNG: equal breath sounds bilateral  HEART: normal S1S2, RRR  ABDOMEN: Soft, Nontender, +BS, No flank tenderness  EXTREMITIES: No clubbing, cyanosis, or edema, no calf tenderness bilateral  Neurology: oriented to person, no focal neurological deficit, ?underlying dementia   SKIN: No rashes            LABS:                        12.1   7.60  )-----------( 160      ( 19 Jan 2020 05:39 )             38.5     01-19    141  |  109<H>  |  45<H>  ----------------------------<  116<H>  3.9   |  19<L>  |  2.38<H>    Ca    8.5      19 Jan 2020 05:39  Phos  3.2     01-19  Mg     2.4     01-19    TPro  6.2  /  Alb  3.0<L>  /  TBili  0.5  /  DBili  x   /  AST  82<H>  /  ALT  104<H>  /  AlkPhos  103  01-19

## 2020-01-20 LAB
ANION GAP SERPL CALC-SCNC: 11 MMOL/L — SIGNIFICANT CHANGE UP (ref 5–17)
BASOPHILS # BLD AUTO: 0 K/UL — SIGNIFICANT CHANGE UP (ref 0–0.2)
BASOPHILS NFR BLD AUTO: 0 % — SIGNIFICANT CHANGE UP (ref 0–2)
BUN SERPL-MCNC: 33 MG/DL — HIGH (ref 7–23)
CALCIUM SERPL-MCNC: 8.9 MG/DL — SIGNIFICANT CHANGE UP (ref 8.4–10.5)
CHLORIDE SERPL-SCNC: 112 MMOL/L — HIGH (ref 96–108)
CO2 SERPL-SCNC: 22 MMOL/L — SIGNIFICANT CHANGE UP (ref 22–31)
CREAT SERPL-MCNC: 2.26 MG/DL — HIGH (ref 0.5–1.3)
EOSINOPHIL # BLD AUTO: 0 K/UL — SIGNIFICANT CHANGE UP (ref 0–0.5)
EOSINOPHIL NFR BLD AUTO: 0 % — SIGNIFICANT CHANGE UP (ref 0–6)
GLUCOSE SERPL-MCNC: 136 MG/DL — HIGH (ref 70–99)
HCT VFR BLD CALC: 43.2 % — SIGNIFICANT CHANGE UP (ref 34.5–45)
HGB BLD-MCNC: 13.4 G/DL — SIGNIFICANT CHANGE UP (ref 11.5–15.5)
LYMPHOCYTES # BLD AUTO: 0.36 K/UL — LOW (ref 1–3.3)
LYMPHOCYTES # BLD AUTO: 4.4 % — LOW (ref 13–44)
MAGNESIUM SERPL-MCNC: 2.2 MG/DL — SIGNIFICANT CHANGE UP (ref 1.6–2.6)
MCHC RBC-ENTMCNC: 29.1 PG — SIGNIFICANT CHANGE UP (ref 27–34)
MCHC RBC-ENTMCNC: 31 GM/DL — LOW (ref 32–36)
MCV RBC AUTO: 93.9 FL — SIGNIFICANT CHANGE UP (ref 80–100)
MONOCYTES # BLD AUTO: 0.43 K/UL — SIGNIFICANT CHANGE UP (ref 0–0.9)
MONOCYTES NFR BLD AUTO: 5.3 % — SIGNIFICANT CHANGE UP (ref 2–14)
NEUTROPHILS # BLD AUTO: 7.18 K/UL — SIGNIFICANT CHANGE UP (ref 1.8–7.4)
NEUTROPHILS NFR BLD AUTO: 88.6 % — HIGH (ref 43–77)
PHOSPHATE SERPL-MCNC: 3.3 MG/DL — SIGNIFICANT CHANGE UP (ref 2.5–4.5)
PLATELET # BLD AUTO: 191 K/UL — SIGNIFICANT CHANGE UP (ref 150–400)
POTASSIUM SERPL-MCNC: 4.1 MMOL/L — SIGNIFICANT CHANGE UP (ref 3.5–5.3)
POTASSIUM SERPL-SCNC: 4.1 MMOL/L — SIGNIFICANT CHANGE UP (ref 3.5–5.3)
RBC # BLD: 4.6 M/UL — SIGNIFICANT CHANGE UP (ref 3.8–5.2)
RBC # FLD: 13.2 % — SIGNIFICANT CHANGE UP (ref 10.3–14.5)
SODIUM SERPL-SCNC: 145 MMOL/L — SIGNIFICANT CHANGE UP (ref 135–145)
WBC # BLD: 8.1 K/UL — SIGNIFICANT CHANGE UP (ref 3.8–10.5)
WBC # FLD AUTO: 8.1 K/UL — SIGNIFICANT CHANGE UP (ref 3.8–10.5)

## 2020-01-20 PROCEDURE — 99233 SBSQ HOSP IP/OBS HIGH 50: CPT | Mod: GC

## 2020-01-20 RX ORDER — ISOSORBIDE MONONITRATE 60 MG/1
30 TABLET, EXTENDED RELEASE ORAL DAILY
Refills: 0 | Status: DISCONTINUED | OUTPATIENT
Start: 2020-01-20 | End: 2020-01-27

## 2020-01-20 RX ORDER — AMLODIPINE BESYLATE 2.5 MG/1
5 TABLET ORAL DAILY
Refills: 0 | Status: DISCONTINUED | OUTPATIENT
Start: 2020-01-20 | End: 2020-01-20

## 2020-01-20 RX ADMIN — CEFTAROLINE FOSAMIL 50 MILLIGRAM(S): 600 POWDER, FOR SOLUTION INTRAVENOUS at 17:24

## 2020-01-20 RX ADMIN — Medication 3 MILLILITER(S): at 11:45

## 2020-01-20 RX ADMIN — Medication 3 MILLILITER(S): at 04:14

## 2020-01-20 RX ADMIN — GABAPENTIN 100 MILLIGRAM(S): 400 CAPSULE ORAL at 17:24

## 2020-01-20 RX ADMIN — Medication 3 MILLILITER(S): at 15:03

## 2020-01-20 RX ADMIN — ATORVASTATIN CALCIUM 40 MILLIGRAM(S): 80 TABLET, FILM COATED ORAL at 23:52

## 2020-01-20 RX ADMIN — Medication 75 MILLIGRAM(S): at 17:24

## 2020-01-20 RX ADMIN — IVABRADINE 5 MILLIGRAM(S): 7.5 TABLET, FILM COATED ORAL at 08:46

## 2020-01-20 RX ADMIN — IVABRADINE 5 MILLIGRAM(S): 7.5 TABLET, FILM COATED ORAL at 17:24

## 2020-01-20 RX ADMIN — Medication 3 MILLILITER(S): at 19:02

## 2020-01-20 RX ADMIN — Medication 100 MICROGRAM(S): at 08:46

## 2020-01-20 RX ADMIN — ENOXAPARIN SODIUM 30 MILLIGRAM(S): 100 INJECTION SUBCUTANEOUS at 17:24

## 2020-01-20 RX ADMIN — CEFTAROLINE FOSAMIL 50 MILLIGRAM(S): 600 POWDER, FOR SOLUTION INTRAVENOUS at 06:00

## 2020-01-20 RX ADMIN — GABAPENTIN 100 MILLIGRAM(S): 400 CAPSULE ORAL at 08:45

## 2020-01-20 RX ADMIN — ISOSORBIDE MONONITRATE 30 MILLIGRAM(S): 60 TABLET, EXTENDED RELEASE ORAL at 11:45

## 2020-01-20 RX ADMIN — Medication 3 MILLILITER(S): at 23:52

## 2020-01-20 RX ADMIN — Medication 3 MILLILITER(S): at 06:00

## 2020-01-20 RX ADMIN — Medication 75 MILLIGRAM(S): at 08:46

## 2020-01-20 NOTE — PROGRESS NOTE ADULT - PROBLEM SELECTOR PLAN 1
Presented with 3/4 SIRS criteria, febrile 102F (rectal), tachypneic to 22, leukocytosis Wbc 21.03 on admission. Creatinine elevated from baseline of 0.9 with no elevated lacted. Fluids were held because patient was thought to be fluid-overloaded (crackles in the lungs and shortness of breath requiring BIPAP). S/p Solumedrol 120mg IV x1, Zosyn 3.375g IV x1, Vancomycin 1g IV x1. Duoneb x3 in the ED. Trops elevated likely in the setting of demand ischemia now peaked. CXR clear on admission, UA neg, influenza AH3 positive. Found to have new RLL infiltrate on CXR on 01/14. Procalcitonin 83 with new WBC 13.84.   - s/p on tamiflu 30mg BID (started on 01/13 with last day on 01/17)  - f/u urine cultures, currently growing E coli   - MRSA swab positive   - blood cultures positive for MRSA  - f/u on surveillance cultures, currently NGTD  - holding antihypertensives for now in the setting of sepsis, can restart appropriately when needed  - Stopped ceftriaxone 1 g IV daily   - stopped vancomycin  - c/w ceftaroline 400 mg q 12 hrs (start date 01/16)     #MRSA bacteremia. See above  - follow-up on whether TTE is necessary    - cardiology following, appreciate recs

## 2020-01-20 NOTE — PROGRESS NOTE ADULT - PROBLEM SELECTOR PLAN 4
Previous echo on 02/2019 with EF of 25%. Presented with one day history of worsening shortness of breath, crackles on exam, BNP 40,975, which may possibly be CHF exacerbation secondary to the flu. Held the fluids given that she was volume overloaded requiring BIPAP. ABG pH 7.35 PO2 285. Was given IV lasix 20 mg in ED. Now off BiPAP on HFNC breathing comfortably.   - f/u TTE, possibly for 01/21  - in setting of worsening creatinine, will hold diuretics   - restarted home digoxin 0.125mg every other day  - On ivabradine 5mg twice a day with meals at home (cardiologist Dr. Jamaal Shaw [phone])  - holding home dose of coreg from 25mg BID   - holding home isosorbide mononitrate 30mg daily  - holding enalapril 10mg BID  - daily weights   - strict I+Os

## 2020-01-20 NOTE — CHART NOTE - NSCHARTNOTEFT_GEN_A_CORE
Upon Nutritional Assessment by the Registered Dietitian your patient was determined to meet criteria / has evidence of the following diagnosis/diagnoses:          [ ]  Mild Protein Calorie Malnutrition        [ ]  Moderate Protein Calorie Malnutrition        [ ] Severe Protein Calorie Malnutrition        [ ] Unspecified Protein Calorie Malnutrition        [ X ] Underweight / BMI <19        [ ] Morbid Obesity / BMI > 40      Findings as based on:  •  Comprehensive nutrition assessment and consultation  Ht: 62" Wt(1/16): 102lbs IBW: 110lbs (+/-10%), 93%IBW, BMI: 18.7 kg/m2    Treatment:    The following diet has been recommended:  1) Recommend continue with DASH/TLC diet + Ensure Enlive TID (provides 350kcal, 20g protein per can). Continue to monitor renal indices to assess need for further dietary restrictions.  2) Continue to monitor and encourage PO intake throughout the day.   3) Appreciate feeding assistance prn.   4) Monitor lytes and replete prn.  5) Obtain and monitor wt trends.    PROVIDER Section:     By signing this assessment you are acknowledging and agree with the diagnosis/diagnoses assigned by the Registered Dietitian    Comments:

## 2020-01-20 NOTE — CHART NOTE - NSCHARTNOTEFT_GEN_A_CORE
Admitting Diagnosis:   Patient is a 88y old  Female who presents with a chief complaint of sepsis (20 Jan 2020 11:43)      PAST MEDICAL & SURGICAL HISTORY:  HLD (hyperlipidemia)  Hypertension  No significant past surgical history      Current Nutrition Order:   Diet, NPO after Midnight:      NPO Start Date: 20-Jan-2020,   NPO Start Time: 23:59 (01-20-20 @ 09:57)  Diet, DASH/TLC:   Sodium & Cholesterol Restricted  Supplement Feeding Modality:  Oral  Ensure Enlive Cans or Servings Per Day:  1       Frequency:  Three Times a day (01-20-20 @ 13:51)      PO Intake: Good (%) [   ]  Fair (50-75%) [ X ] Poor (<25%) [   ]    GI Issues: No N/V/D/C noted. +BM 1/19 per flowsheet.     Pain: No pain noted at this time.     Skin Integrity: Intact pressure-wise noted.     Labs:   01-20    145  |  112<H>  |  33<H>  ----------------------------<  136<H>  4.1   |  22  |  2.26<H>    Ca    8.9      20 Jan 2020 06:08  Phos  3.3     01-20  Mg     2.2     01-20    TPro  6.2  /  Alb  3.0<L>  /  TBili  0.5  /  DBili  x   /  AST  82<H>  /  ALT  104<H>  /  AlkPhos  103  01-19    CAPILLARY BLOOD GLUCOSE          Medications:  MEDICATIONS  (STANDING):  albuterol/ipratropium for Nebulization 3 milliLiter(s) Nebulizer every 4 hours  atorvastatin 40 milliGRAM(s) Oral daily  ceftaroline fosamil IVPB 400 milliGRAM(s) IV Intermittent every 12 hours  digoxin     Tablet 0.125 milliGRAM(s) Oral every other day  enoxaparin Injectable 30 milliGRAM(s) SubCutaneous every 24 hours  gabapentin 100 milliGRAM(s) Oral every 12 hours  influenza   Vaccine 0.5 milliLiter(s) IntraMuscular once  isosorbide   mononitrate ER Tablet (IMDUR) 30 milliGRAM(s) Oral daily  ivabradine 5 milliGRAM(s) Oral two times a day  levothyroxine 100 MICROGram(s) Oral daily  venlafaxine 75 milliGRAM(s) Oral every 12 hours    MEDICATIONS  (PRN):  guaiFENesin   Syrup  (Sugar-Free) 100 milliGRAM(s) Oral every 6 hours PRN Cough  morphine  - Injectable 1 milliGRAM(s) IV Push every 4 hours PRN Breathlessness/respiratory distress      Weight: (1/16) 102lbs     Weight Change: No new wts to assess. Continue to monitor wt trends.     Nutrition Focused Physical Exam: Completed [   ]  Not Pertinent [ X ]    Estimated energy needs:   Ht: 62" Wt(1/16): 102lbs IBW: 110lbs (+/-10%), 93%IBW, BMI: 18.7 kg/m2   Nutrient needs based on [facility] standards of care for older adults. Needs adjusted for CHF. Fluids deferred to team 2/2 CHF.  Lower range of protein recommended 2/2 NICOLÁS.   ABW (46.4 kg) used for EER as pt between % of IBW.  (25-30 kcal/kg): 3034-8728 kcal/day  (0.75-1.0 gm/kg): 35-46 g protein/day     Subjective:   Pt is a 88 y.o F with PMHx significant for HTN, HLD, CHF (EF 20%), pre-diabetes and "leaky valves" presents with SOB admitted for severe sepsis 2/2 influenza, infiltrate on CXR and MRSA bacteremia. S/p rapid response called for air hunger and dyspnea, s/p morphine IVP for air hunger. Pt is DNR/DNI but continues on antibiotics and supportive care. On enhanced supervision. Pt currently stable on HFNC. Nephrology following for nonoliguric NICOLÁS.     Pt seen asleep, not responding to verbal stimuli though attempted to visit multiple times. Note based on comprehensive chart review. Observed 50% finished lunch tray at bedside (mashed potatoes, salmon, soup, pudding). No N/V/D/C noted. +BM 1/19 noted per flowsheet. Ensure Enlive noted just added to diet order. Continue to monitor PO intake. RD to f/u per protocol.     Previous Nutrition Diagnosis:  Inadequate Oral Intake R/T physiologic state 2/2 acute influenza illness AEB less than 50%.    Active [ X ]  Resolved [   ]    Goal: Meet >75% of needs consistently.     Recommendations:  1) Recommend continue with DASH/TLC diet + Ensure Enlive TID (provides 350kcal, 20g protein per can).   2) Continue to monitor and encourage PO intake throughout the day.   3) Appreciate feeding assistance prn.   4) Monitor lytes and replete prn.  5) Obtain and monitor wt trends.    Education:   Nutrition education deferred at this time.    Risk Level: High [ X ] Moderate [   ] Low [   ] Admitting Diagnosis:   Patient is a 88y old  Female who presents with a chief complaint of sepsis (20 Jan 2020 11:43)      PAST MEDICAL & SURGICAL HISTORY:  HLD (hyperlipidemia)  Hypertension  No significant past surgical history      Current Nutrition Order:   Diet, NPO after Midnight:      NPO Start Date: 20-Jan-2020,   NPO Start Time: 23:59 (01-20-20 @ 09:57)  Diet, DASH/TLC:   Sodium & Cholesterol Restricted  Supplement Feeding Modality:  Oral  Ensure Enlive Cans or Servings Per Day:  1       Frequency:  Three Times a day (01-20-20 @ 13:51)      PO Intake: Good (%) [   ]  Fair (50-75%) [ X ] Poor (<25%) [   ]    GI Issues: No N/V/D/C noted. +BM 1/19 per flowsheet.     Pain: No pain noted at this time.     Skin Integrity: Intact pressure-wise noted.     Labs:   01-20    145  |  112<H>  |  33<H>  ----------------------------<  136<H>  4.1   |  22  |  2.26<H>    Ca    8.9      20 Jan 2020 06:08  Phos  3.3     01-20  Mg     2.2     01-20    TPro  6.2  /  Alb  3.0<L>  /  TBili  0.5  /  DBili  x   /  AST  82<H>  /  ALT  104<H>  /  AlkPhos  103  01-19    CAPILLARY BLOOD GLUCOSE          Medications:  MEDICATIONS  (STANDING):  albuterol/ipratropium for Nebulization 3 milliLiter(s) Nebulizer every 4 hours  atorvastatin 40 milliGRAM(s) Oral daily  ceftaroline fosamil IVPB 400 milliGRAM(s) IV Intermittent every 12 hours  digoxin     Tablet 0.125 milliGRAM(s) Oral every other day  enoxaparin Injectable 30 milliGRAM(s) SubCutaneous every 24 hours  gabapentin 100 milliGRAM(s) Oral every 12 hours  influenza   Vaccine 0.5 milliLiter(s) IntraMuscular once  isosorbide   mononitrate ER Tablet (IMDUR) 30 milliGRAM(s) Oral daily  ivabradine 5 milliGRAM(s) Oral two times a day  levothyroxine 100 MICROGram(s) Oral daily  venlafaxine 75 milliGRAM(s) Oral every 12 hours    MEDICATIONS  (PRN):  guaiFENesin   Syrup  (Sugar-Free) 100 milliGRAM(s) Oral every 6 hours PRN Cough  morphine  - Injectable 1 milliGRAM(s) IV Push every 4 hours PRN Breathlessness/respiratory distress      Weight: (1/16) 102lbs     Weight Change: No new wts to assess. Continue to monitor wt trends.     Nutrition Focused Physical Exam: Completed [   ]  Not Pertinent [ X ]    Estimated energy needs:   Ht: 62" Wt(1/16): 102lbs IBW: 110lbs (+/-10%), 93%IBW, BMI: 18.7 kg/m2   Nutrient needs based on [facility] standards of care for older adults. Needs adjusted for CHF. Fluids deferred to team 2/2 CHF.  Lower range of protein recommended 2/2 NICOLÁS.   ABW (46.4 kg) used for EER as pt between % of IBW.  (25-30 kcal/kg): 7671-8189 kcal/day  (0.75-1.0 gm/kg): 35-46 g protein/day     Subjective:   Pt is a 88 y.o F with PMHx significant for HTN, HLD, CHF (EF 20%), pre-diabetes and "leaky valves" presents with SOB admitted for severe sepsis 2/2 influenza, infiltrate on CXR and MRSA bacteremia. S/p rapid response called for air hunger and dyspnea, s/p morphine IVP for air hunger. Pt is DNR/DNI but continues on antibiotics and supportive care. On enhanced supervision. Pt currently stable on HFNC. Nephrology following for nonoliguric NICOLÁS.     Pt seen asleep, not responding to verbal stimuli though attempted to visit multiple times. Note based on comprehensive chart review. Observed 50% finished lunch tray at bedside (mashed potatoes, salmon, soup, pudding). No N/V/D/C noted. +BM 1/19 noted per flowsheet. Ensure Enlive noted just added to diet order. Continue to monitor PO intake. RD to f/u per protocol.     Previous Nutrition Diagnosis:  Inadequate Oral Intake R/T physiologic state 2/2 acute influenza illness AEB pt currently meeting ~50% of EER.    Active [ X ]  Resolved [   ]    Goal: Meet >75% of needs consistently.     Recommendations:  1) Recommend continue with DASH/TLC diet + Ensure Enlive TID (provides 350kcal, 20g protein per can).   2) Continue to monitor and encourage PO intake throughout the day.   3) Appreciate feeding assistance prn.   4) Monitor lytes and replete prn.  5) Obtain and monitor wt trends.    Education:   Nutrition education deferred at this time.    Risk Level: High [ X ] Moderate [   ] Low [   ]

## 2020-01-20 NOTE — PROGRESS NOTE ADULT - ASSESSMENT
per Internal Medicine    88F PMH HTN, HLD, HFrEF (EF 25% from 02/2019), LBBB, pre-diabetes, RLS presents with shortness of breath for one day admitted for severe sepsis secondary to influenza, infiltrate on CXR, and MRSA bacteremia. Currently stable on nasal cannula and breathing comfortably.     Problem/Plan - 1:  ·  Problem: Severe sepsis.  Plan: Presented with 3/4 SIRS criteria, febrile 102F (rectal), tachypneic to 22, leukocytosis Wbc 21.03 on admission. Creatinine elevated from baseline of 0.9 with no elevated lacted. Fluids were held because patient was thought to be fluid-overloaded (crackles in the lungs and shortness of breath requiring BIPAP). S/p Solumedrol 120mg IV x1, Zosyn 3.375g IV x1, Vancomycin 1g IV x1. Duoneb x3 in the ED. Trops elevated likely in the setting of demand ischemia now peaked. CXR clear on admission, UA neg, influenza AH3 positive. Found to have new RLL infiltrate on CXR on 01/14. Procalcitonin 83 with new WBC 13.84.   - s/p on tamiflu 30mg BID (started on 01/13 with last day on 01/17)  - f/u urine cultures, currently growing E coli   - MRSA swab positive   - blood cultures positive for MRSA  - f/u on surveillance cultures, currently NGTD  - holding antihypertensives for now in the setting of sepsis, can restart appropriately when needed  - Stopped ceftriaxone 1 g IV daily   - stopped vancomycin  - c/w ceftaroline 400 mg q 12 hrs (start date 01/16)     #MRSA bacteremia. See above  - follow-up on whether TTE is necessary    - cardiology following, appreciate recs.     Problem/Plan - 2:  ·  Problem: MRSA bacteremia.  Plan: - ID following   - see above.     Problem/Plan - 3:  ·  Problem: Acute respiratory failure with hypoxia.  Plan: Patient has increased work of breathing intermittently when placed on NC. Now on BiPAP breathing comfortably. Resolving, patient initially required BiPapp, weaned to HFNC. Likely in setting of MRSA PNA and Flu.     Problem/Plan - 4:  ·  Problem: R/O CHF exacerbation.  Plan: Previous echo on 02/2019 with EF of 25%. Presented with one day history of worsening shortness of breath, crackles on exam, BNP 40,975, which may possibly be CHF exacerbation secondary to the flu. Held the fluids given that she was volume overloaded requiring BIPAP. ABG pH 7.35 PO2 285. Was given IV lasix 20 mg in ED. Now off BiPAP on HFNC breathing comfortably.   - f/u TTE, possibly for 01/21  - in setting of worsening creatinine, will hold diuretics   - restarted home digoxin 0.125mg every other day  - On ivabradine 5mg twice a day with meals at home (cardiologist Dr. Jamaal Shaw [phone])  - holding home dose of coreg from 25mg BID   - holding home isosorbide mononitrate 30mg daily  - holding enalapril 10mg BID  - daily weights   - strict I+Os.     Problem/Plan - 5:  ·  Problem: NICOLÁS (acute kidney injury).  Plan: presenting with cr 1.62 (baseline 0.9) etiology likely prerenal in the setting of severe sepsis due to flu. This AM creatinine worsened to 2.97. FENa 0.4 percent.   - avoid nephrotoxic agents  - hold ACE/ARB  - continue to trend CMP daily    #R/o ATN. Presented with NICOLÁS, but UOP   - continue to closely monitor UOP.     Problem/Plan - 6:  Problem: Elevated troponin. Plan: presenting with an elevated troponin of 0.07 in the setting of demand ischemia. EKG with no ST changes, LBBB unchanged from prior EKG. Trop peaked at 0.06. Had CP and SOB on 01/15 with elevated trop to 0.09 that peaked.   - No need to continue to trend     #?cardiac arrythmia  pt on digoxin 0.125mg every other day and ivabradine 5mg at home with meals. Unsure why. Will continue. Called cardiologist Dr. Jamaal Shaw for more information [phone]  -obtain more collateral.    Problem/Plan - 7:  ·  Problem: Transaminitis.  Plan: presenting with alkphos 155 and  likely in the setting of sepsis v hepatic congestion. Today, LFTs uptrended to AST//191. Most likely due to sepsis.   - daily LFTs.     Problem/Plan - 8:  ·  Problem: Hypertension.  Plan: history of hypertension, currently normotensive  - holding home carvedilol 25mg BID at home  - holding home isosorbide mononitrate 30mg daily   - holding home Enalapril 10mg BID  - patient currently normotensive but can restart antihypertensives appropriately as needed.     Problem/Plan - 9:  ·  Problem: Prophylactic measure.  Plan: F: none  E:  Replete K<4, Mg< 2  N: DASH diet.

## 2020-01-20 NOTE — PROGRESS NOTE ADULT - ASSESSMENT
88F PMH HTN, HLD, HFrEF (EF 25% from 02/2019), LBBB, pre-diabetes, RLS presents with shortness of breath for one day admitted for severe sepsis secondary to influenza, infiltrate on CXR, and MRSA bacteremia  called to evaluate for non-oliguric NICOLÁS

## 2020-01-20 NOTE — PROGRESS NOTE ADULT - SUBJECTIVE AND OBJECTIVE BOX
CC: SEPSIS      INTERVAL HISTORY:  lying in bed  non-conversant      ROS: No chest pain, no sob, no abd pain. No n/v/d    PAST MEDICAL & SURGICAL HISTORY:  HLD (hyperlipidemia)  Hypertension  No significant past surgical history      PHYSICAL EXAM:  T(C): 36.4 (01-20-20 @ 05:36), Max: 37.2 (01-19-20 @ 12:34)  HR: 83 (01-20-20 @ 05:36)  BP: 171/84 (01-20-20 @ 05:36) (156/66 - 171/84)  RR: 19 (01-20-20 @ 05:36)  SpO2: 98% (01-20-20 @ 05:36)  Wt(kg): --  I&O's Summary    Weight 46.4 (01-16 @ 13:49)  General:NAD.  HEENT: moist mucous membranes, no pallor/cyanosis.  Neck: no JVD visible.  Cardiac: S1, S2. RRR. No murmurs   Respratory: rhonchi  Abdomen: soft. nontender. nondistended  Skin: no rashes.  Extremities: no LE edema b/l  Access:       DATA:                        13.4   8.10  )-----------( 191      ( 20 Jan 2020 06:08 )             43.2         145    |  112<H>  |  33<H>  ----------------------------<  136<H>  Ca:8.9   (20 Jan 2020 06:08)  4.1     |  22     |  2.26<H>      eGFR if Non : 19 <L>  eGFR if : 22 <L>    TPro  6.2    /  Alb  3.0<L>  /  TBili  0.5    /  DBili  x      /  AST  82<H>  /  ALT  104<H>  /  AlkPhos  103    19 Jan 2020 05:39                    MEDICATIONS  (STANDING):  albuterol/ipratropium for Nebulization 3 milliLiter(s) Nebulizer every 4 hours  atorvastatin 40 milliGRAM(s) Oral daily  ceftaroline fosamil IVPB 400 milliGRAM(s) IV Intermittent every 12 hours  digoxin     Tablet 0.125 milliGRAM(s) Oral every other day  enoxaparin Injectable 30 milliGRAM(s) SubCutaneous every 24 hours  gabapentin 100 milliGRAM(s) Oral every 12 hours  influenza   Vaccine 0.5 milliLiter(s) IntraMuscular once  ivabradine 5 milliGRAM(s) Oral two times a day  levothyroxine 100 MICROGram(s) Oral daily  venlafaxine 75 milliGRAM(s) Oral every 12 hours    MEDICATIONS  (PRN):  guaiFENesin   Syrup  (Sugar-Free) 100 milliGRAM(s) Oral every 6 hours PRN Cough  morphine  - Injectable 1 milliGRAM(s) IV Push every 4 hours PRN Breathlessness/respiratory distress

## 2020-01-20 NOTE — PROGRESS NOTE ADULT - SUBJECTIVE AND OBJECTIVE BOX
Physical Medicine and Rehabilitation Progress Note:    Patient is a 88y old  Female who presents with a chief complaint of sepsis (20 Jan 2020 08:54)      HPI:  88F PMH HTN, HLD, and "leaky valves" presents with shortness of breath for one day. Most of the history is provided by patient's niece Pebbles Kan (HCP) at bedside. The niece reports that the patient was in [place] for 3 weeks and got back 4 days ago. During the time the patient was away, she hasn't been taking her medications compliantly because of her forgetfullness. She resumed her medications yesterday. Yesterday morning the patient started feeling short of breath and progressively got worse over time with her breathing becoming more labored. Her niece noticed that by the end of the day she was gasping for air and decided to bring her into the emergency room. The niece says that 4 days ago when she picked her up from the airport, the patient forgot her coat on the plane and was walking in the cold to her car without her coat. Otherwise, she denies any fevers, chills, night sweats, chest pain, abdominal pain, n/v/d/c, sick contacts. She lives with a friend at home and ambulates using a walker.     ED Course:  Vitals: T 102F (rectal) HR 90 /90 RR 22 O2 98% (nonrebreather)  Labs: Wbc 21.03 Hb 14.0 Plt 198 CO2 19 BUN/Cr 22/1.62 Trop 0.07 BNP 40,975 Alkphos 155  ALT 28   CXR Clear, UA neg, EKG LBBB unchanged from prior  Given Tylenol 650mg, Solumedrol 120mg IV x1, Zosyn 3.375g IV x1, Vancomycin 1g IV x1, Duoneb x3, placed on BIPAP (13 Jan 2020 16:04)                            13.4   8.10  )-----------( 191      ( 20 Jan 2020 06:08 )             43.2       01-20    145  |  112<H>  |  33<H>  ----------------------------<  136<H>  4.1   |  22  |  2.26<H>    Ca    8.9      20 Jan 2020 06:08  Phos  3.3     01-20  Mg     2.2     01-20    TPro  6.2  /  Alb  3.0<L>  /  TBili  0.5  /  DBili  x   /  AST  82<H>  /  ALT  104<H>  /  AlkPhos  103  01-19    Vital Signs Last 24 Hrs  T(C): 36.4 (20 Jan 2020 05:36), Max: 37.2 (19 Jan 2020 12:34)  T(F): 97.6 (20 Jan 2020 05:36), Max: 99 (19 Jan 2020 12:34)  HR: 85 (20 Jan 2020 10:30) (69 - 85)  BP: 171/84 (20 Jan 2020 05:36) (156/66 - 171/84)  BP(mean): --  RR: 19 (20 Jan 2020 05:36) (18 - 20)  SpO2: 95% (20 Jan 2020 10:30) (94% - 98%)    MEDICATIONS  (STANDING):  albuterol/ipratropium for Nebulization 3 milliLiter(s) Nebulizer every 4 hours  atorvastatin 40 milliGRAM(s) Oral daily  ceftaroline fosamil IVPB 400 milliGRAM(s) IV Intermittent every 12 hours  digoxin     Tablet 0.125 milliGRAM(s) Oral every other day  enoxaparin Injectable 30 milliGRAM(s) SubCutaneous every 24 hours  gabapentin 100 milliGRAM(s) Oral every 12 hours  influenza   Vaccine 0.5 milliLiter(s) IntraMuscular once  isosorbide   mononitrate ER Tablet (IMDUR) 30 milliGRAM(s) Oral daily  ivabradine 5 milliGRAM(s) Oral two times a day  levothyroxine 100 MICROGram(s) Oral daily  venlafaxine 75 milliGRAM(s) Oral every 12 hours    MEDICATIONS  (PRN):  guaiFENesin   Syrup  (Sugar-Free) 100 milliGRAM(s) Oral every 6 hours PRN Cough  morphine  - Injectable 1 milliGRAM(s) IV Push every 4 hours PRN Breathlessness/respiratory distress    Currently Undergoing Physical Therapy at bedside.    Functional Status Assessment:    Therapeutic Interventions      Bed Mobility  Bed Mobility Training Rehab Potential: fair, will monitor progress closely  Bed Mobility Training Symptoms Noted During/After Treatment: increased pain  Bed Mobility Training Rolling/Turning: maximum assist (25% patient effort);  2 person assist  Bed Mobility Training Scooting: maximum assist (25% patient effort);  2 person assist;  verbal cues  Bed Mobility Training Bridging: moderate assist (50% patient effort);  1 person assist  Bed Mobility Training Sit-to-Supine: maximum assist (25% patient effort);  2 person assist;  verbal cues;  nonverbal cues (demo/gestures)  Bed Mobility Training Supine-to-Sit: maximum assist (25% patient effort);  verbal cues;  nonverbal cues (demo/gestures);  2 person assist  Bed Mobility Training Limitations: decreased ability to use legs for bridging/pushing;  impaired ability to control trunk for mobility;  decreased ability to use arms for pushing/pulling;  pain;  impaired balance;  decreased strength;  cognitive, decreased safety awareness    Sit-Stand Transfer Training  Sit-to-Stand Transfer Training Charges: Attempt but unable due to patients inability to follow commands and strong retropulsion     Therapeutic Exercise  Therapeutic Exercise Detail: Pt sat on EOB x 8 minutes with max A to keep trunk upright, max VC provided to maintain sitting balance but exhibited strong retropulsion, not following commands to perform therapeutic ex on EOB           PM&R Impression: as above    Current Disposition Plan Recommendations: subacute rehab placement

## 2020-01-20 NOTE — PROGRESS NOTE ADULT - SUBJECTIVE AND OBJECTIVE BOX
INTERVAL HPI/OVERNIGHT EVENTS:    OVERNIGHT: No overnight events.  SUBJECTIVE: Patient seen and examined at bedside. Has not needed BiPAP for 3 days.     OBJECTIVE:    VITAL SIGNS:  ICU Vital Signs Last 24 Hrs  T(C): 36.4 (20 Jan 2020 05:36), Max: 37.2 (19 Jan 2020 12:34)  T(F): 97.6 (20 Jan 2020 05:36), Max: 99 (19 Jan 2020 12:34)  HR: 83 (20 Jan 2020 05:36) (69 - 83)  BP: 171/84 (20 Jan 2020 05:36) (156/66 - 171/84)  BP(mean): --  ABP: --  ABP(mean): --  RR: 19 (20 Jan 2020 05:36) (18 - 20)  SpO2: 98% (20 Jan 2020 05:36) (93% - 98%)        CAPILLARY BLOOD GLUCOSE          PHYSICAL EXAM:  General: comfortable, NAD, on HFNC  HEENT: NCAT, PERRL, clear conjunctiva, no scleral icterus  Neck: supple, no JVD  Respiratory: rhonchi in all lung fields   Cardiovascular: RRR, normal S1S2, no M/R/G  Vascular: 2+ radial and DP pulses  Abdomen: soft, NT/ND, bowel sounds in all four quadrants, no palpable masses  Extremities: WWP, no clubbing, cyanosis, or edema  Skin: No rashes present  Neuro: A&Ox1    MEDICATIONS:  MEDICATIONS  (STANDING):  albuterol/ipratropium for Nebulization 3 milliLiter(s) Nebulizer every 4 hours  atorvastatin 40 milliGRAM(s) Oral daily  ceftaroline fosamil IVPB 400 milliGRAM(s) IV Intermittent every 12 hours  digoxin     Tablet 0.125 milliGRAM(s) Oral every other day  enoxaparin Injectable 30 milliGRAM(s) SubCutaneous every 24 hours  gabapentin 100 milliGRAM(s) Oral every 12 hours  influenza   Vaccine 0.5 milliLiter(s) IntraMuscular once  ivabradine 5 milliGRAM(s) Oral two times a day  levothyroxine 100 MICROGram(s) Oral daily  venlafaxine 75 milliGRAM(s) Oral every 12 hours    MEDICATIONS  (PRN):  guaiFENesin   Syrup  (Sugar-Free) 100 milliGRAM(s) Oral every 6 hours PRN Cough  morphine  - Injectable 1 milliGRAM(s) IV Push every 4 hours PRN Breathlessness/respiratory distress      ALLERGIES:  Allergies    No Known Allergies    Intolerances        LABS:                        13.4   8.10  )-----------( 191      ( 20 Jan 2020 06:08 )             43.2     01-20    145  |  112<H>  |  33<H>  ----------------------------<  136<H>  4.1   |  22  |  2.26<H>    Ca    8.9      20 Jan 2020 06:08  Phos  3.3     01-20  Mg     2.2     01-20    TPro  6.2  /  Alb  3.0<L>  /  TBili  0.5  /  DBili  x   /  AST  82<H>  /  ALT  104<H>  /  AlkPhos  103  01-19          RADIOLOGY & ADDITIONAL TESTS: Reviewed.

## 2020-01-20 NOTE — PROGRESS NOTE ADULT - PROBLEM SELECTOR PLAN 6
presenting with an elevated troponin of 0.07 in the setting of demand ischemia. EKG with no ST changes, LBBB unchanged from prior EKG. Trop peaked at 0.06. Had CP and SOB on 01/15 with elevated trop to 0.09 that peaked.   - No need to continue to trend     #?cardiac arrythmia  pt on digoxin 0.125mg every other day and ivabradine 5mg at home with meals. Unsure why. Will continue. Called cardiologist Dr. Jamaal Shaw for more information [phone]  -obtain more collateral

## 2020-01-21 DIAGNOSIS — A41.01 SEPSIS DUE TO METHICILLIN SUSCEPTIBLE STAPHYLOCOCCUS AUREUS: ICD-10-CM

## 2020-01-21 LAB
ALBUMIN SERPL ELPH-MCNC: 3 G/DL — LOW (ref 3.3–5)
ALP SERPL-CCNC: 112 U/L — SIGNIFICANT CHANGE UP (ref 40–120)
ALT FLD-CCNC: 62 U/L — HIGH (ref 10–45)
ANION GAP SERPL CALC-SCNC: 13 MMOL/L — SIGNIFICANT CHANGE UP (ref 5–17)
AST SERPL-CCNC: 37 U/L — SIGNIFICANT CHANGE UP (ref 10–40)
BILIRUB SERPL-MCNC: 0.8 MG/DL — SIGNIFICANT CHANGE UP (ref 0.2–1.2)
BUN SERPL-MCNC: 26 MG/DL — HIGH (ref 7–23)
CALCIUM SERPL-MCNC: 8.6 MG/DL — SIGNIFICANT CHANGE UP (ref 8.4–10.5)
CHLORIDE SERPL-SCNC: 113 MMOL/L — HIGH (ref 96–108)
CO2 SERPL-SCNC: 20 MMOL/L — LOW (ref 22–31)
CREAT SERPL-MCNC: 2.12 MG/DL — HIGH (ref 0.5–1.3)
CULTURE RESULTS: SIGNIFICANT CHANGE UP
GLUCOSE BLDC GLUCOMTR-MCNC: 110 MG/DL — HIGH (ref 70–99)
GLUCOSE SERPL-MCNC: 151 MG/DL — HIGH (ref 70–99)
HCT VFR BLD CALC: 40.4 % — SIGNIFICANT CHANGE UP (ref 34.5–45)
HGB BLD-MCNC: 12.6 G/DL — SIGNIFICANT CHANGE UP (ref 11.5–15.5)
MAGNESIUM SERPL-MCNC: 2.1 MG/DL — SIGNIFICANT CHANGE UP (ref 1.6–2.6)
MCHC RBC-ENTMCNC: 28.7 PG — SIGNIFICANT CHANGE UP (ref 27–34)
MCHC RBC-ENTMCNC: 31.2 GM/DL — LOW (ref 32–36)
MCV RBC AUTO: 92 FL — SIGNIFICANT CHANGE UP (ref 80–100)
NRBC # BLD: 0 /100 WBCS — SIGNIFICANT CHANGE UP (ref 0–0)
PHOSPHATE SERPL-MCNC: 2.7 MG/DL — SIGNIFICANT CHANGE UP (ref 2.5–4.5)
PLATELET # BLD AUTO: 236 K/UL — SIGNIFICANT CHANGE UP (ref 150–400)
POTASSIUM SERPL-MCNC: 3.7 MMOL/L — SIGNIFICANT CHANGE UP (ref 3.5–5.3)
POTASSIUM SERPL-SCNC: 3.7 MMOL/L — SIGNIFICANT CHANGE UP (ref 3.5–5.3)
PROT SERPL-MCNC: 6.8 G/DL — SIGNIFICANT CHANGE UP (ref 6–8.3)
RBC # BLD: 4.39 M/UL — SIGNIFICANT CHANGE UP (ref 3.8–5.2)
RBC # FLD: 13.3 % — SIGNIFICANT CHANGE UP (ref 10.3–14.5)
SODIUM SERPL-SCNC: 146 MMOL/L — HIGH (ref 135–145)
SPECIMEN SOURCE: SIGNIFICANT CHANGE UP
WBC # BLD: 12.61 K/UL — HIGH (ref 3.8–10.5)
WBC # FLD AUTO: 12.61 K/UL — HIGH (ref 3.8–10.5)

## 2020-01-21 PROCEDURE — 99232 SBSQ HOSP IP/OBS MODERATE 35: CPT

## 2020-01-21 PROCEDURE — 93312 ECHO TRANSESOPHAGEAL: CPT | Mod: 26

## 2020-01-21 PROCEDURE — 99233 SBSQ HOSP IP/OBS HIGH 50: CPT

## 2020-01-21 RX ORDER — CARVEDILOL PHOSPHATE 80 MG/1
12.5 CAPSULE, EXTENDED RELEASE ORAL EVERY 12 HOURS
Refills: 0 | Status: DISCONTINUED | OUTPATIENT
Start: 2020-01-21 | End: 2020-01-27

## 2020-01-21 RX ADMIN — ATORVASTATIN CALCIUM 40 MILLIGRAM(S): 80 TABLET, FILM COATED ORAL at 21:52

## 2020-01-21 RX ADMIN — ISOSORBIDE MONONITRATE 30 MILLIGRAM(S): 60 TABLET, EXTENDED RELEASE ORAL at 12:55

## 2020-01-21 RX ADMIN — GABAPENTIN 100 MILLIGRAM(S): 400 CAPSULE ORAL at 06:42

## 2020-01-21 RX ADMIN — GABAPENTIN 100 MILLIGRAM(S): 400 CAPSULE ORAL at 19:03

## 2020-01-21 RX ADMIN — Medication 3 MILLILITER(S): at 19:03

## 2020-01-21 RX ADMIN — Medication 0.12 MILLIGRAM(S): at 12:55

## 2020-01-21 RX ADMIN — Medication 3 MILLILITER(S): at 02:40

## 2020-01-21 RX ADMIN — CEFTAROLINE FOSAMIL 50 MILLIGRAM(S): 600 POWDER, FOR SOLUTION INTRAVENOUS at 19:02

## 2020-01-21 RX ADMIN — Medication 3 MILLILITER(S): at 16:38

## 2020-01-21 RX ADMIN — CARVEDILOL PHOSPHATE 12.5 MILLIGRAM(S): 80 CAPSULE, EXTENDED RELEASE ORAL at 08:44

## 2020-01-21 RX ADMIN — Medication 100 MICROGRAM(S): at 06:43

## 2020-01-21 RX ADMIN — Medication 3 MILLILITER(S): at 12:54

## 2020-01-21 RX ADMIN — IVABRADINE 5 MILLIGRAM(S): 7.5 TABLET, FILM COATED ORAL at 19:03

## 2020-01-21 RX ADMIN — Medication 75 MILLIGRAM(S): at 19:03

## 2020-01-21 RX ADMIN — IVABRADINE 5 MILLIGRAM(S): 7.5 TABLET, FILM COATED ORAL at 09:09

## 2020-01-21 RX ADMIN — Medication 3 MILLILITER(S): at 06:43

## 2020-01-21 RX ADMIN — CEFTAROLINE FOSAMIL 50 MILLIGRAM(S): 600 POWDER, FOR SOLUTION INTRAVENOUS at 06:42

## 2020-01-21 RX ADMIN — Medication 75 MILLIGRAM(S): at 06:43

## 2020-01-21 RX ADMIN — ENOXAPARIN SODIUM 30 MILLIGRAM(S): 100 INJECTION SUBCUTANEOUS at 19:03

## 2020-01-21 RX ADMIN — CARVEDILOL PHOSPHATE 12.5 MILLIGRAM(S): 80 CAPSULE, EXTENDED RELEASE ORAL at 19:03

## 2020-01-21 NOTE — PROGRESS NOTE ADULT - PROBLEM SELECTOR PLAN 8
history of hypertension, currently normotensive  - restarted half home dose of coreg (12.5 mg bid)   - restarted home isosorbide mononitrate 30mg daily   - holding home Enalapril 10mg BID in setting of NICOLÁS

## 2020-01-21 NOTE — CHART NOTE - NSCHARTNOTEFT_GEN_A_CORE
Spoke to Ms. Pebbles Kan about rescinding DNR/DNI for transesophageal echocardiogram. She consents to remove the order for the procedure, and for the DNR/DNI order to be placed back afterwards.

## 2020-01-21 NOTE — PROGRESS NOTE ADULT - SUBJECTIVE AND OBJECTIVE BOX
Infectious Diseases Progress Note:    SUBJECTIVE: Patient seen and examined at bedside. Patient denies fever, chills, HA, nausea, vomiting, abdominal pain, dysuria, diarrhea.    SEPSIS    Acute respiratory failure with hypoxia  MRSA bacteremia  Severe sepsis  Transaminitis  Elevated troponin  NICOLÁS (acute kidney injury)  Transition of care performed with sharing of clinical summary  Prophylactic measure  Dementia  Hyperlipemia  Hypertension  CHF exacerbation  Sepsis      Allergies    No Known Allergies    Intolerances        ANTIBIOTICS/RELEVANT:  antimicrobials  ceftaroline fosamil IVPB 400 milliGRAM(s) IV Intermittent every 12 hours    immunologic:  influenza   Vaccine 0.5 milliLiter(s) IntraMuscular once      OTHER:  albuterol/ipratropium for Nebulization 3 milliLiter(s) Nebulizer every 4 hours  atorvastatin 40 milliGRAM(s) Oral daily  carvedilol 12.5 milliGRAM(s) Oral every 12 hours  digoxin     Tablet 0.125 milliGRAM(s) Oral every other day  enoxaparin Injectable 30 milliGRAM(s) SubCutaneous every 24 hours  gabapentin 100 milliGRAM(s) Oral every 12 hours  guaiFENesin   Syrup  (Sugar-Free) 100 milliGRAM(s) Oral every 6 hours PRN  isosorbide   mononitrate ER Tablet (IMDUR) 30 milliGRAM(s) Oral daily  ivabradine 5 milliGRAM(s) Oral two times a day  levothyroxine 100 MICROGram(s) Oral daily  morphine  - Injectable 1 milliGRAM(s) IV Push every 4 hours PRN  venlafaxine 75 milliGRAM(s) Oral every 12 hours      Objective:  Vital Signs Last 24 Hrs  T(C): 36.7 (21 Jan 2020 12:57), Max: 37.1 (20 Jan 2020 21:00)  T(F): 98 (21 Jan 2020 12:57), Max: 98.7 (20 Jan 2020 21:00)  HR: 66 (21 Jan 2020 12:57) (66 - 82)  BP: 123/76 (21 Jan 2020 12:57) (123/76 - 180/87)  BP(mean): --  RR: 19 (21 Jan 2020 12:57) (17 - 19)  SpO2: 95% (21 Jan 2020 12:57) (93% - 95%)    PHYSICAL EXAM:  Constitutional: Well-developed, well nourished  Eyes: PERRLA, EOMI  Ear/Nose/Throat: no oral lesion, no sinus tenderness on percussion	  Neck: no JVD, no lymphadenopathy, supple  Respiratory: CTA bilaterally  Cardiovascular: S1S2, RRR, no murmurs  Gastrointestinal: soft, NTND, (+) BS, no HSM  MSK: cervical spine tender to palpation, but without CVA tenderness  Skin: no rashes    LABS:                        12.6   12.61 )-----------( 236      ( 21 Jan 2020 07:02 )             40.4     01-21    146<H>  |  113<H>  |  26<H>  ----------------------------<  151<H>  3.7   |  20<L>  |  2.12<H>    Ca    8.6      21 Jan 2020 07:02  Phos  2.7     01-21  Mg     2.1     01-21    TPro  6.8  /  Alb  3.0<L>  /  TBili  0.8  /  DBili  x   /  AST  37  /  ALT  62<H>  /  AlkPhos  112  01-21          MICROBIOLOGY:  Culture - Blood (01.16.20 @ 17:43)    Specimen Source: .Blood Blood    Culture Results:   No growth at 5 days.              RADIOLOGY & ADDITIONAL STUDIES: Reviewed Infectious Diseases Progress Note:    SUBJECTIVE: Patient seen and examined at bedside. Patient denies fever, chills, HA, nausea, vomiting, abdominal pain, dysuria, diarrhea.    SEPSIS    Acute respiratory failure with hypoxia  MRSA bacteremia  Severe sepsis  Transaminitis  Elevated troponin  NICOLÁS (acute kidney injury)  Transition of care performed with sharing of clinical summary  Prophylactic measure  Dementia  Hyperlipemia  Hypertension  CHF exacerbation  Sepsis      Allergies    No Known Allergies    Intolerances        ANTIBIOTICS/RELEVANT:  antimicrobials  ceftaroline fosamil IVPB 400 milliGRAM(s) IV Intermittent every 12 hours    immunologic:  influenza   Vaccine 0.5 milliLiter(s) IntraMuscular once      OTHER:  albuterol/ipratropium for Nebulization 3 milliLiter(s) Nebulizer every 4 hours  atorvastatin 40 milliGRAM(s) Oral daily  carvedilol 12.5 milliGRAM(s) Oral every 12 hours  digoxin     Tablet 0.125 milliGRAM(s) Oral every other day  enoxaparin Injectable 30 milliGRAM(s) SubCutaneous every 24 hours  gabapentin 100 milliGRAM(s) Oral every 12 hours  guaiFENesin   Syrup  (Sugar-Free) 100 milliGRAM(s) Oral every 6 hours PRN  isosorbide   mononitrate ER Tablet (IMDUR) 30 milliGRAM(s) Oral daily  ivabradine 5 milliGRAM(s) Oral two times a day  levothyroxine 100 MICROGram(s) Oral daily  morphine  - Injectable 1 milliGRAM(s) IV Push every 4 hours PRN  venlafaxine 75 milliGRAM(s) Oral every 12 hours      Objective:  Vital Signs Last 24 Hrs  T(C): 36.7 (21 Jan 2020 12:57), Max: 37.1 (20 Jan 2020 21:00)  T(F): 98 (21 Jan 2020 12:57), Max: 98.7 (20 Jan 2020 21:00)  HR: 66 (21 Jan 2020 12:57) (66 - 82)  BP: 123/76 (21 Jan 2020 12:57) (123/76 - 180/87)  BP(mean): --  RR: 19 (21 Jan 2020 12:57) (17 - 19)  SpO2: 95% (21 Jan 2020 12:57) (93% - 95%)    PHYSICAL EXAM:  Constitutional: Well-developed, well nourished  Eyes: PERRLA, EOMI  Ear/Nose/Throat: no oral lesion, no sinus tenderness on percussion	  Neck: no JVD, no lymphadenopathy, supple  Respiratory: CTA bilaterally  Cardiovascular: S1S2, RRR, no murmurs  Gastrointestinal: soft, NTND, (+) BS, no HSM  Skin: no rashes    LABS:                        12.6   12.61 )-----------( 236      ( 21 Jan 2020 07:02 )             40.4     01-21    146<H>  |  113<H>  |  26<H>  ----------------------------<  151<H>  3.7   |  20<L>  |  2.12<H>    Ca    8.6      21 Jan 2020 07:02  Phos  2.7     01-21  Mg     2.1     01-21    TPro  6.8  /  Alb  3.0<L>  /  TBili  0.8  /  DBili  x   /  AST  37  /  ALT  62<H>  /  AlkPhos  112  01-21          MICROBIOLOGY:  Culture - Blood (01.16.20 @ 17:43)    Specimen Source: .Blood Blood    Culture Results:   No growth at 5 days.              RADIOLOGY & ADDITIONAL STUDIES: Reviewed

## 2020-01-21 NOTE — PROGRESS NOTE ADULT - PROBLEM SELECTOR PLAN 4
Previous echo on 02/2019 with EF of 25%. Presented with one day history of worsening shortness of breath, crackles on exam, BNP 40,975, which may possibly be CHF exacerbation secondary to the flu. Held the fluids given that she was volume overloaded requiring BIPAP. ABG pH 7.35 PO2 285. Was given IV lasix 20 mg in ED. Now off BiPAP on HFNC breathing comfortably.   - f/u DK for today  - in setting of worsening creatinine, will hold diuretics   - restarted home digoxin 0.125mg every other day  - On ivabradine 5mg twice a day with meals at home (cardiologist Dr. Jamaal Shaw [phone])  - started coreg 12.5 mg BID (half home dose)   - started isosorbide mononitrate 30mg daily  - holding enalapril 10mg BID in setting of NICOLÁS   - daily weights   - strict I+Os

## 2020-01-21 NOTE — PROGRESS NOTE ADULT - SUBJECTIVE AND OBJECTIVE BOX
no acute events overnight  afebrile, hemodynamically stable  Cr trending down  am bladder scan 0.5L, straight cath 0.7L        Meds:  albuterol/ipratropium for Nebulization 3 every 4 hours  atorvastatin 40 daily  carvedilol 12.5 every 12 hours  ceftaroline fosamil IVPB 400 every 12 hours  digoxin     Tablet 0.125 every other day  enoxaparin Injectable 30 every 24 hours  gabapentin 100 every 12 hours  guaiFENesin   Syrup  (Sugar-Free) 100 every 6 hours PRN  influenza   Vaccine 0.5 once  isosorbide   mononitrate ER Tablet (IMDUR) 30 daily  ivabradine 5 two times a day  levothyroxine 100 daily  morphine  - Injectable 1 every 4 hours PRN  venlafaxine 75 every 12 hours      T(C): , Max: 37.1 (01-20-20 @ 21:00)  T(F): , Max: 98.7 (01-20-20 @ 21:00)  HR: 66 (01-21-20 @ 06:18)  BP: 177/79 (01-21-20 @ 06:18)  BP(mean): --  RR: 17 (01-21-20 @ 05:40)  SpO2: 93% (01-21-20 @ 05:40)  Wt(kg): --    01-20 @ 07:01  -  01-21 @ 07:00  --------------------------------------------------------  IN: 100 mL / OUT: 700 mL / NET: -600 mL          PHYSICAL EXAM:  GENERAL: alert, no acute distress at present, on RA  NECK: supple, No JVD  CHEST/LUNG: equal breath sounds bilateral  HEART: normal S1S2, RRR  ABDOMEN: Soft, Nontender, +BS, No flank tenderness  EXTREMITIES: No clubbing, cyanosis, or edema, no calf tenderness bilateral  Neurology: alert, no focal neurological deficit, demented   SKIN: No rashes      LABS:                        12.6   12.61 )-----------( 236      ( 21 Jan 2020 07:02 )             40.4     01-21    146<H>  |  113<H>  |  26<H>  ----------------------------<  151<H>  3.7   |  20<L>  |  2.12<H>    Ca    8.6      21 Jan 2020 07:02  Phos  2.7     01-21  Mg     2.1     01-21    TPro  6.8  /  Alb  3.0<L>  /  TBili  0.8  /  DBili  x   /  AST  37  /  ALT  62<H>  /  AlkPhos  112  01-21

## 2020-01-21 NOTE — PROGRESS NOTE ADULT - PROBLEM SELECTOR PLAN 6
presenting with an elevated troponin of 0.07 in the setting of demand ischemia. EKG with no ST changes, LBBB unchanged from prior EKG. Trop peaked at 0.06. Had CP and SOB on 01/15 with elevated trop to 0.09 that peaked.   - No need to continue to trend

## 2020-01-21 NOTE — PROGRESS NOTE ADULT - PROBLEM SELECTOR PLAN 7
presenting with alkphos 155 and  likely in the setting of sepsis v hepatic congestion. LFTs now downtrending.   - daily LFTs

## 2020-01-21 NOTE — PROGRESS NOTE ADULT - ASSESSMENT
88F PMH HTN, HLD, HFrEF (EF 25% from 02/2019), LBBB, pre-diabetes, RLS presents with shortness of breath for one day admitted for severe sepsis secondary to influenza, infiltrate on CXR, and MRSA bacteremia. Currently stable on nasal cannula and breathing comfortably.       #MRSA bacteremia  - DK to r/o vegetation  - c/w ceftaroline 400 mg Q12    Plan discussed with ID attending.

## 2020-01-21 NOTE — PROGRESS NOTE ADULT - PROBLEM SELECTOR PLAN 3
Patient has increased work of breathing intermittently when placed on NC. Now on BiPAP breathing comfortably. Resolving, patient initially required BiPapp, weaned to HFNC. Likely in setting of MRSA PNA and Flu. Now resolved and on room air.

## 2020-01-21 NOTE — PROVIDER CONTACT NOTE (OTHER) - ACTION/TREATMENT ORDERED:
missing DNR DNI paper.not on chart DR Cruz made aware.claimed he will endorsed to the day team.
BIPAP placed back on pt , IV 1 mg morphine was administered,  pt seems to have improved breathing , after morphine , family at bedside, holding pt hand to keep her calm , will continue to monitor,

## 2020-01-21 NOTE — PROGRESS NOTE ADULT - ASSESSMENT
87 yo F PMHx HTN, HLD, CHF (EF 20%) admitted with sepsis due to influenza, MRSA bacteremia, UTI.   Nephrology consulted for nonoliguric NICOLÁS.      # Nonoliguric NICOLÁS   - baseline Cr 0.9 in 9/2019, 1.62 on admission, slowly improving Cr down to 2.1   - likely due to sepsis-related hypoperfusion, hypoxia, poor oral intake/ dehydration, unclear use of NSAIDs  - maintain adequate hydration/ encourage oral intake/ assist with feeds  - volume status and electrolytes acceptable   - avoid ACE/ARB/NSAIDs/PPIs  - renal diet, daily weight, in/out

## 2020-01-21 NOTE — PROGRESS NOTE ADULT - PROBLEM SELECTOR PLAN 1
Presented with 3/4 SIRS criteria, febrile 102F (rectal), tachypneic to 22, leukocytosis Wbc 21.03 on admission. Creatinine elevated from baseline of 0.9 with no elevated lacted. Fluids were held because patient was thought to be fluid-overloaded (crackles in the lungs and shortness of breath requiring BIPAP). S/p Solumedrol 120mg IV x1, Zosyn 3.375g IV x1, Vancomycin 1g IV x1. Duoneb x3 in the ED. Trops elevated likely in the setting of demand ischemia now peaked. CXR clear on admission, UA neg, influenza AH3 positive. Found to have new RLL infiltrate on CXR on 01/14. Procalcitonin 83 with new WBC 13.84.   - s/p on tamiflu 30mg BID (started on 01/13 with last day on 01/17)  - f/u urine cultures, currently growing E coli   - MRSA swab positive   - blood cultures positive for MRSA  - f/u on surveillance cultures, currently NGTD  - restarted imdur and coreg 12.5 bid (half home dose)   - Stopped ceftriaxone 1 g IV daily   - stopped vancomycin  - c/w ceftaroline 400 mg q 12 hrs (start date 01/16) and follow-up with ID for abx duration    #MRSA bacteremia. See above  - NPO for DK today  - DNR/DNI rescinded for DK and to restart after the procedure   - cardiology following, appreciate recs

## 2020-01-21 NOTE — PROGRESS NOTE ADULT - PROBLEM SELECTOR PLAN 5
presenting with cr 1.62 (baseline 0.9) etiology likely prerenal in the setting of severe sepsis due to flu. Creatinine improving but still elevated to prior baseline (Now 2.12). FENa 0.4 percent.   - avoid nephrotoxic agents  - hold ACE/ARB  - continue to trend CMP daily    #R/o ATN. Presented with NICOLÁS, but UOP   - continue to closely monitor UOP

## 2020-01-21 NOTE — PROGRESS NOTE ADULT - SUBJECTIVE AND OBJECTIVE BOX
INTERVAL HPI/OVERNIGHT EVENTS:    OVERNIGHT: Had 550 cc of urine via bladder scan; was straight cathed.   SUBJECTIVE: Patient seen and examined at bedside. Tired but off nasal cannula.     OBJECTIVE:    VITAL SIGNS:  ICU Vital Signs Last 24 Hrs  T(C): 36.9 (21 Jan 2020 05:40), Max: 37.1 (20 Jan 2020 21:00)  T(F): 98.5 (21 Jan 2020 05:40), Max: 98.7 (20 Jan 2020 21:00)  HR: 66 (21 Jan 2020 06:18) (66 - 86)  BP: 177/79 (21 Jan 2020 06:18) (139/73 - 180/87)  BP(mean): --  ABP: --  ABP(mean): --  RR: 17 (21 Jan 2020 05:40) (17 - 18)  SpO2: 93% (21 Jan 2020 05:40) (93% - 95%)        01-20 @ 07:01  -  01-21 @ 07:00  --------------------------------------------------------  IN: 100 mL / OUT: 700 mL / NET: -600 mL      CAPILLARY BLOOD GLUCOSE          PHYSICAL EXAM:  General: NAD, comfortable  HEENT: NCAT, PERRL, clear conjunctiva, no scleral icterus  Neck: supple, no JVD  Respiratory: mild rhonchi in all lung fields; no expiratory wheezes   Cardiovascular: RRR, normal S1S2, no M/R/G  Vascular: 2+ radial and DP pulses  Abdomen: soft, NT/ND, bowel sounds in all four quadrants, no palpable masses  Extremities: WWP, no clubbing, cyanosis, or edema  Skin: No rashes present  Neuro: A&Ox1, NFD, cranial nerves in tact     MEDICATIONS:  MEDICATIONS  (STANDING):  albuterol/ipratropium for Nebulization 3 milliLiter(s) Nebulizer every 4 hours  atorvastatin 40 milliGRAM(s) Oral daily  carvedilol 12.5 milliGRAM(s) Oral every 12 hours  ceftaroline fosamil IVPB 400 milliGRAM(s) IV Intermittent every 12 hours  digoxin     Tablet 0.125 milliGRAM(s) Oral every other day  enoxaparin Injectable 30 milliGRAM(s) SubCutaneous every 24 hours  gabapentin 100 milliGRAM(s) Oral every 12 hours  influenza   Vaccine 0.5 milliLiter(s) IntraMuscular once  isosorbide   mononitrate ER Tablet (IMDUR) 30 milliGRAM(s) Oral daily  ivabradine 5 milliGRAM(s) Oral two times a day  levothyroxine 100 MICROGram(s) Oral daily  venlafaxine 75 milliGRAM(s) Oral every 12 hours    MEDICATIONS  (PRN):  guaiFENesin   Syrup  (Sugar-Free) 100 milliGRAM(s) Oral every 6 hours PRN Cough  morphine  - Injectable 1 milliGRAM(s) IV Push every 4 hours PRN Breathlessness/respiratory distress      ALLERGIES:  Allergies    No Known Allergies    Intolerances        LABS:                        12.6   12.61 )-----------( 236      ( 21 Jan 2020 07:02 )             40.4     01-21    146<H>  |  113<H>  |  26<H>  ----------------------------<  151<H>  3.7   |  20<L>  |  2.12<H>    Ca    8.6      21 Jan 2020 07:02  Phos  2.7     01-21  Mg     2.1     01-21    TPro  6.8  /  Alb  3.0<L>  /  TBili  0.8  /  DBili  x   /  AST  37  /  ALT  62<H>  /  AlkPhos  112  01-21          RADIOLOGY & ADDITIONAL TESTS: Reviewed. Hospital Course     88F PMH HTN, HLD, HFrEF (EF 25% from 02/2019), LBBB, pre-diabetes, RLS presents with shortness of breath for one day admitted for severe sepsis secondary to influenza and found to have MRSA bacteremia. Initially on ceftriaxone but switched to vancomycin on 01/14 in the setting of positive MRSA in blood cultures. Given evidence of NICOLÁS (creatinine 2.8-2.9 from baseline of 0.9), patient vancomycin was followed daily with vanc level to titrate to level between 15-20. To maintain urine output, she was started on a minimal dose of NS. Echo performed on 01/15 showed EF of 20%, severely dilated LA, and combined with akinesis of inferolateral, basal, and inferoseptal walls. Throughout her hospital stay, patient demonstrated increased work of breathing, which was managed with BiPAP. On 01/16, patient was on NC. She became tachycardic to 130-150s and was also gasping for breath. BiPAP was placed, but patient was agitated and removed the face mask. She continued to demonstrate difficulty breathing, and a rapid response was called. EKG showed MAT. She was given morphine 1 mg IVP and placed back on BiPAP. Per discussion with her family, patient will be given standing PRN order of morphine for respiratory effort. Moreover, the family is amenable to continue current management, which includes IVF, BiPAP, and antibiotics.  They wish to have no escalation of care (patient DNR/DNI) and want to focus on comfort when she has respiratory distress. Patient’s vancomycin was stopped and ceftaroline was begun on 01/16 (last day 01/26 as per ID). Respiratory status improved, and patient as of now is breathing comfortably on room air without extra oxygen or pressure support.  Progressively became more and more hypertensive and restarted her home Imdur and half her home dose of Coreg. Now pending DK. Patient is incontinent and is also retaining urine.     INTERVAL HPI/OVERNIGHT EVENTS:    OVERNIGHT: Had 550 cc of urine via bladder scan; was straight cathed.   SUBJECTIVE: Patient seen and examined at bedside. Tired but off nasal cannula.     OBJECTIVE:    VITAL SIGNS:  ICU Vital Signs Last 24 Hrs  T(C): 36.9 (21 Jan 2020 05:40), Max: 37.1 (20 Jan 2020 21:00)  T(F): 98.5 (21 Jan 2020 05:40), Max: 98.7 (20 Jan 2020 21:00)  HR: 66 (21 Jan 2020 06:18) (66 - 86)  BP: 177/79 (21 Jan 2020 06:18) (139/73 - 180/87)  BP(mean): --  ABP: --  ABP(mean): --  RR: 17 (21 Jan 2020 05:40) (17 - 18)  SpO2: 93% (21 Jan 2020 05:40) (93% - 95%)        01-20 @ 07:01  -  01-21 @ 07:00  --------------------------------------------------------  IN: 100 mL / OUT: 700 mL / NET: -600 mL      CAPILLARY BLOOD GLUCOSE          PHYSICAL EXAM:  General: NAD, comfortable  HEENT: NCAT, PERRL, clear conjunctiva, no scleral icterus  Neck: supple, no JVD  Respiratory: mild rhonchi in all lung fields; no expiratory wheezes   Cardiovascular: RRR, normal S1S2, no M/R/G  Vascular: 2+ radial and DP pulses  Abdomen: soft, NT/ND, bowel sounds in all four quadrants, no palpable masses  Extremities: WWP, no clubbing, cyanosis, or edema  Skin: No rashes present  Neuro: A&Ox1, NFD, cranial nerves in tact     MEDICATIONS:  MEDICATIONS  (STANDING):  albuterol/ipratropium for Nebulization 3 milliLiter(s) Nebulizer every 4 hours  atorvastatin 40 milliGRAM(s) Oral daily  carvedilol 12.5 milliGRAM(s) Oral every 12 hours  ceftaroline fosamil IVPB 400 milliGRAM(s) IV Intermittent every 12 hours  digoxin     Tablet 0.125 milliGRAM(s) Oral every other day  enoxaparin Injectable 30 milliGRAM(s) SubCutaneous every 24 hours  gabapentin 100 milliGRAM(s) Oral every 12 hours  influenza   Vaccine 0.5 milliLiter(s) IntraMuscular once  isosorbide   mononitrate ER Tablet (IMDUR) 30 milliGRAM(s) Oral daily  ivabradine 5 milliGRAM(s) Oral two times a day  levothyroxine 100 MICROGram(s) Oral daily  venlafaxine 75 milliGRAM(s) Oral every 12 hours    MEDICATIONS  (PRN):  guaiFENesin   Syrup  (Sugar-Free) 100 milliGRAM(s) Oral every 6 hours PRN Cough  morphine  - Injectable 1 milliGRAM(s) IV Push every 4 hours PRN Breathlessness/respiratory distress      ALLERGIES:  Allergies    No Known Allergies    Intolerances        LABS:                        12.6   12.61 )-----------( 236      ( 21 Jan 2020 07:02 )             40.4     01-21    146<H>  |  113<H>  |  26<H>  ----------------------------<  151<H>  3.7   |  20<L>  |  2.12<H>    Ca    8.6      21 Jan 2020 07:02  Phos  2.7     01-21  Mg     2.1     01-21    TPro  6.8  /  Alb  3.0<L>  /  TBili  0.8  /  DBili  x   /  AST  37  /  ALT  62<H>  /  AlkPhos  112  01-21          RADIOLOGY & ADDITIONAL TESTS: Reviewed.

## 2020-01-22 LAB
ALBUMIN SERPL ELPH-MCNC: 3 G/DL — LOW (ref 3.3–5)
ALP SERPL-CCNC: 101 U/L — SIGNIFICANT CHANGE UP (ref 40–120)
ALT FLD-CCNC: 47 U/L — HIGH (ref 10–45)
ANION GAP SERPL CALC-SCNC: 6 MMOL/L — SIGNIFICANT CHANGE UP (ref 5–17)
AST SERPL-CCNC: 29 U/L — SIGNIFICANT CHANGE UP (ref 10–40)
BILIRUB SERPL-MCNC: 0.8 MG/DL — SIGNIFICANT CHANGE UP (ref 0.2–1.2)
BUN SERPL-MCNC: 34 MG/DL — HIGH (ref 7–23)
CALCIUM SERPL-MCNC: 8.6 MG/DL — SIGNIFICANT CHANGE UP (ref 8.4–10.5)
CHLORIDE SERPL-SCNC: 117 MMOL/L — HIGH (ref 96–108)
CO2 SERPL-SCNC: 22 MMOL/L — SIGNIFICANT CHANGE UP (ref 22–31)
CREAT SERPL-MCNC: 2.03 MG/DL — HIGH (ref 0.5–1.3)
GLUCOSE SERPL-MCNC: 126 MG/DL — HIGH (ref 70–99)
HCT VFR BLD CALC: 39.6 % — SIGNIFICANT CHANGE UP (ref 34.5–45)
HGB BLD-MCNC: 12.7 G/DL — SIGNIFICANT CHANGE UP (ref 11.5–15.5)
MAGNESIUM SERPL-MCNC: 2.2 MG/DL — SIGNIFICANT CHANGE UP (ref 1.6–2.6)
MCHC RBC-ENTMCNC: 29.8 PG — SIGNIFICANT CHANGE UP (ref 27–34)
MCHC RBC-ENTMCNC: 32.1 GM/DL — SIGNIFICANT CHANGE UP (ref 32–36)
MCV RBC AUTO: 93 FL — SIGNIFICANT CHANGE UP (ref 80–100)
NRBC # BLD: 0 /100 WBCS — SIGNIFICANT CHANGE UP (ref 0–0)
PHOSPHATE SERPL-MCNC: 4.3 MG/DL — SIGNIFICANT CHANGE UP (ref 2.5–4.5)
PLATELET # BLD AUTO: 249 K/UL — SIGNIFICANT CHANGE UP (ref 150–400)
POTASSIUM SERPL-MCNC: 4.3 MMOL/L — SIGNIFICANT CHANGE UP (ref 3.5–5.3)
POTASSIUM SERPL-SCNC: 4.3 MMOL/L — SIGNIFICANT CHANGE UP (ref 3.5–5.3)
PROT SERPL-MCNC: 6.8 G/DL — SIGNIFICANT CHANGE UP (ref 6–8.3)
RBC # BLD: 4.26 M/UL — SIGNIFICANT CHANGE UP (ref 3.8–5.2)
RBC # FLD: 13.8 % — SIGNIFICANT CHANGE UP (ref 10.3–14.5)
SODIUM SERPL-SCNC: 145 MMOL/L — SIGNIFICANT CHANGE UP (ref 135–145)
WBC # BLD: 10.72 K/UL — HIGH (ref 3.8–10.5)
WBC # FLD AUTO: 10.72 K/UL — HIGH (ref 3.8–10.5)

## 2020-01-22 PROCEDURE — 99233 SBSQ HOSP IP/OBS HIGH 50: CPT | Mod: GC

## 2020-01-22 PROCEDURE — 99232 SBSQ HOSP IP/OBS MODERATE 35: CPT

## 2020-01-22 RX ORDER — HEPARIN SODIUM 5000 [USP'U]/ML
5000 INJECTION INTRAVENOUS; SUBCUTANEOUS EVERY 12 HOURS
Refills: 0 | Status: DISCONTINUED | OUTPATIENT
Start: 2020-01-22 | End: 2020-01-27

## 2020-01-22 RX ADMIN — HEPARIN SODIUM 5000 UNIT(S): 5000 INJECTION INTRAVENOUS; SUBCUTANEOUS at 18:12

## 2020-01-22 RX ADMIN — CEFTAROLINE FOSAMIL 50 MILLIGRAM(S): 600 POWDER, FOR SOLUTION INTRAVENOUS at 18:12

## 2020-01-22 RX ADMIN — ISOSORBIDE MONONITRATE 30 MILLIGRAM(S): 60 TABLET, EXTENDED RELEASE ORAL at 11:13

## 2020-01-22 RX ADMIN — CEFTAROLINE FOSAMIL 50 MILLIGRAM(S): 600 POWDER, FOR SOLUTION INTRAVENOUS at 05:47

## 2020-01-22 RX ADMIN — Medication 100 MICROGRAM(S): at 05:48

## 2020-01-22 RX ADMIN — GABAPENTIN 100 MILLIGRAM(S): 400 CAPSULE ORAL at 05:48

## 2020-01-22 RX ADMIN — CARVEDILOL PHOSPHATE 12.5 MILLIGRAM(S): 80 CAPSULE, EXTENDED RELEASE ORAL at 18:13

## 2020-01-22 RX ADMIN — Medication 3 MILLILITER(S): at 09:42

## 2020-01-22 RX ADMIN — Medication 75 MILLIGRAM(S): at 05:47

## 2020-01-22 RX ADMIN — IVABRADINE 5 MILLIGRAM(S): 7.5 TABLET, FILM COATED ORAL at 18:13

## 2020-01-22 RX ADMIN — CARVEDILOL PHOSPHATE 12.5 MILLIGRAM(S): 80 CAPSULE, EXTENDED RELEASE ORAL at 06:00

## 2020-01-22 RX ADMIN — Medication 3 MILLILITER(S): at 05:48

## 2020-01-22 RX ADMIN — GABAPENTIN 100 MILLIGRAM(S): 400 CAPSULE ORAL at 18:13

## 2020-01-22 RX ADMIN — IVABRADINE 5 MILLIGRAM(S): 7.5 TABLET, FILM COATED ORAL at 09:42

## 2020-01-22 RX ADMIN — Medication 75 MILLIGRAM(S): at 18:13

## 2020-01-22 RX ADMIN — ATORVASTATIN CALCIUM 40 MILLIGRAM(S): 80 TABLET, FILM COATED ORAL at 21:35

## 2020-01-22 NOTE — PROGRESS NOTE ADULT - SUBJECTIVE AND OBJECTIVE BOX
Infectious Diseases Progress Note:    SUBJECTIVE: Patient seen and examined at bedside.     SEPSIS    Sepsis due to methicillin susceptible Staphylococcus aureus (MSSA) without acute organ dysfunction  Acute respiratory failure with hypoxia  MRSA bacteremia  Severe sepsis  Transaminitis  Elevated troponin  NICOLÁS (acute kidney injury)  Transition of care performed with sharing of clinical summary  Prophylactic measure  Dementia  Hyperlipemia  Hypertension  CHF exacerbation  Sepsis      Allergies    No Known Allergies    Intolerances        ANTIBIOTICS/RELEVANT:  antimicrobials  ceftaroline fosamil IVPB 400 milliGRAM(s) IV Intermittent every 12 hours    immunologic:  influenza   Vaccine 0.5 milliLiter(s) IntraMuscular once      OTHER:  atorvastatin 40 milliGRAM(s) Oral daily  carvedilol 12.5 milliGRAM(s) Oral every 12 hours  digoxin     Tablet 0.125 milliGRAM(s) Oral every other day  gabapentin 100 milliGRAM(s) Oral every 12 hours  guaiFENesin   Syrup  (Sugar-Free) 100 milliGRAM(s) Oral every 6 hours PRN  heparin  Injectable 5000 Unit(s) SubCutaneous every 12 hours  isosorbide   mononitrate ER Tablet (IMDUR) 30 milliGRAM(s) Oral daily  ivabradine 5 milliGRAM(s) Oral two times a day  levothyroxine 100 MICROGram(s) Oral daily  venlafaxine 75 milliGRAM(s) Oral every 12 hours      Objective:  Vital Signs Last 24 Hrs  T(C): 36.8 (22 Jan 2020 12:58), Max: 36.9 (21 Jan 2020 20:32)  T(F): 98.2 (22 Jan 2020 12:58), Max: 98.5 (21 Jan 2020 20:32)  HR: 53 (22 Jan 2020 12:58) (53 - 63)  BP: 159/69 (22 Jan 2020 12:58) (146/64 - 164/70)  BP(mean): --  RR: 18 (22 Jan 2020 12:58) (17 - 18)  SpO2: 96% (22 Jan 2020 12:58) (94% - 99%)    PHYSICAL EXAM:  Constitutional: Well-developed, somnolent but arousable  Eyes: PERRLA, EOMI  Ear/Nose/Throat: no oral lesion, no sinus tenderness on percussion	  Neck:no JVD, no lymphadenopathy, supple  Respiratory: CTA bilateral  Cardiovascular: S1S2, RRR, R sided murmur  Gastrointestinal: soft, NTND, (+) BS, no HSM  Extremities: no c/e/e  Skin: no rashes    LABS:                        12.7   10.72 )-----------( 249      ( 22 Jan 2020 06:23 )             39.6     01-22    145  |  117<H>  |  34<H>  ----------------------------<  126<H>  4.3   |  22  |  2.03<H>    Ca    8.6      22 Jan 2020 06:23  Phos  4.3     01-22  Mg     2.2     01-22    TPro  6.8  /  Alb  3.0<L>  /  TBili  0.8  /  DBili  x   /  AST  29  /  ALT  47<H>  /  AlkPhos  101  01-22          MICROBIOLOGY:  Culture - Blood (01.16.20 @ 17:43)    Specimen Source: .Blood Blood    Culture Results:   No growth at 5 days.              RADIOLOGY & ADDITIONAL STUDIES:

## 2020-01-22 NOTE — PROGRESS NOTE ADULT - ASSESSMENT
88F PMH HTN, HLD, HFrEF (EF 25% from 02/2019), LBBB, pre-diabetes, RLS presents with shortness of breath for one day admitted for severe sepsis secondary to influenza, infiltrate on CXR, and MRSA bacteremia. Currently stable on nasal cannula and breathing comfortably.       #MRSA bacteremia  - DK negative  - recommend CXR to assess progression of PNA to help provide updated abx duration   - c/w ceftaroline 400 mg Q12    Plan discussed with ID attending.

## 2020-01-22 NOTE — PROGRESS NOTE ADULT - PROBLEM SELECTOR PLAN 4
Previous echo on 02/2019 with EF of 25%. Presented with one day history of worsening shortness of breath, crackles on exam, BNP 40,975, which may possibly be CHF exacerbation secondary to the flu. Held the fluids given that she was volume overloaded requiring BIPAP. ABG pH 7.35 PO2 285. Was given IV lasix 20 mg in ED. Now off BiPAP on HFNC breathing comfortably.   - f/u DK for today  - in setting of worsening creatinine, will hold diuretics   - restarted home digoxin 0.125mg every other day  - On ivabradine 5mg twice a day with meals at home (cardiologist Dr. Jamaal Shaw [phone])  - started coreg 12.5 mg BID (half home dose)   - started isosorbide mononitrate 30mg daily  - holding enalapril 10mg BID in setting of NICOLÁS   - daily weights   - strict I+Os Previous echo on 02/2019 with EF of 25%. Presented with one day history of worsening shortness of breath, crackles on exam, BNP 40,975, which may possibly be CHF exacerbation secondary to the flu. Held the fluids given that she was volume overloaded requiring BIPAP. ABG pH 7.35 PO2 285. Was given IV lasix 20 mg in ED. Now off BiPAP on HFNC breathing comfortably.   - in setting of creatinine, will hold diuretics   - restarted home digoxin 0.125mg every other day  - On ivabradine 5mg twice a day with meals at home (cardiologist Dr. Jamaal Shaw [phone])  - started coreg 12.5 mg BID (half home dose)   - started isosorbide mononitrate 30mg daily  - holding enalapril 10mg BID in setting of NICOLÁS   - daily weights   - strict I+Os

## 2020-01-22 NOTE — PROGRESS NOTE ADULT - SUBJECTIVE AND OBJECTIVE BOX
OVERNIGHT EVENTS: Bladder scan with >500cc's o/n; straight cath'd around 1am. DK negative    SUBJECTIVE: Pt seen and examined at bedside. Speaking occasionally in [place], denied chest pain, sob, any pain or discomfort.     Vital Signs Last 12 Hrs  T(F): 97.9 (01-22-20 @ 05:56), Max: 97.9 (01-22-20 @ 05:56)  HR: 60 (01-22-20 @ 05:56) (60 - 60)  BP: 164/70 (01-22-20 @ 05:56) (164/70 - 164/70)  BP(mean): --  RR: 17 (01-22-20 @ 05:56) (17 - 17)  SpO2: 94% (01-22-20 @ 05:56) (94% - 94%)  I&O's Summary      PHYSICAL EXAM:  General: NAD, comfortable  HEENT: NCAT, PERRL, clear conjunctiva, no scleral icterus  Neck: supple, no JVD  Respiratory: mild rhonchi in all lung fields; no expiratory wheezes   Cardiovascular: RRR, normal S1S2, no M/R/G  Vascular: 2+ radial and DP pulses  Abdomen: soft, NT/ND, bowel sounds in all four quadrants, no palpable masses  Extremities: WWP, no clubbing, cyanosis, or edema  Skin: No rashes present  Neuro: A&Ox1, NFD, cranial nerves in tact         LABS:                        12.7   10.72 )-----------( 249      ( 22 Jan 2020 06:23 )             39.6     01-22    145  |  117<H>  |  34<H>  ----------------------------<  126<H>  4.3   |  22  |  2.03<H>    Ca    8.6      22 Jan 2020 06:23  Phos  4.3     01-22  Mg     2.2     01-22    TPro  6.8  /  Alb  3.0<L>  /  TBili  0.8  /  DBili  x   /  AST  29  /  ALT  47<H>  /  AlkPhos  101  01-22          RADIOLOGY & ADDITIONAL TESTS:    MEDICATIONS  (STANDING):  albuterol/ipratropium for Nebulization 3 milliLiter(s) Nebulizer every 4 hours  atorvastatin 40 milliGRAM(s) Oral daily  carvedilol 12.5 milliGRAM(s) Oral every 12 hours  ceftaroline fosamil IVPB 400 milliGRAM(s) IV Intermittent every 12 hours  digoxin     Tablet 0.125 milliGRAM(s) Oral every other day  enoxaparin Injectable 30 milliGRAM(s) SubCutaneous every 24 hours  gabapentin 100 milliGRAM(s) Oral every 12 hours  influenza   Vaccine 0.5 milliLiter(s) IntraMuscular once  isosorbide   mononitrate ER Tablet (IMDUR) 30 milliGRAM(s) Oral daily  ivabradine 5 milliGRAM(s) Oral two times a day  levothyroxine 100 MICROGram(s) Oral daily  venlafaxine 75 milliGRAM(s) Oral every 12 hours    MEDICATIONS  (PRN):  guaiFENesin   Syrup  (Sugar-Free) 100 milliGRAM(s) Oral every 6 hours PRN Cough

## 2020-01-22 NOTE — PROGRESS NOTE ADULT - SUBJECTIVE AND OBJECTIVE BOX
no acute events overnight  afebrile, hemodynamically stable  breathing comfortably on RA, sat 94%  Cr slowly trending down  am bladder scan 0.5L, straight cath 0.5L  repeat Echo: EF 20-25%      Meds:  atorvastatin 40 daily  carvedilol 12.5 every 12 hours  ceftaroline fosamil IVPB 400 every 12 hours  digoxin     Tablet 0.125 every other day  gabapentin 100 every 12 hours  guaiFENesin   Syrup  (Sugar-Free) 100 every 6 hours PRN  heparin  Injectable 5000 every 12 hours  influenza   Vaccine 0.5 once  isosorbide   mononitrate ER Tablet (IMDUR) 30 daily  ivabradine 5 two times a day  levothyroxine 100 daily  venlafaxine 75 every 12 hours      T(C): , Max: 36.9 (01-21-20 @ 20:32)  T(F): , Max: 98.5 (01-21-20 @ 20:32)  HR: 60 (01-22-20 @ 05:56)  BP: 164/70 (01-22-20 @ 05:56)  BP(mean): --  RR: 17 (01-22-20 @ 05:56)  SpO2: 94% (01-22-20 @ 05:56)  Wt(kg): --        PHYSICAL EXAM:  GENERAL: alert, no acute distress at present, on RA  NECK: supple, No JVD  CHEST/LUNG: equal breath sounds bilateral  HEART: normal S1S2, RRR  ABDOMEN: Soft, Nontender, +BS, No flank tenderness  EXTREMITIES: No clubbing, cyanosis, or edema, no calf tenderness bilateral  Neurology: alert, no focal neurological deficit, demented   SKIN: No rashes        LABS:                        12.7   10.72 )-----------( 249      ( 22 Jan 2020 06:23 )             39.6     01-22    145  |  117<H>  |  34<H>  ----------------------------<  126<H>  4.3   |  22  |  2.03<H>    Ca    8.6      22 Jan 2020 06:23  Phos  4.3     01-22  Mg     2.2     01-22    TPro  6.8  /  Alb  3.0<L>  /  TBili  0.8  /  DBili  x   /  AST  29  /  ALT  47<H>  /  AlkPhos  101  01-22                RADIOLOGY & ADDITIONAL STUDIES:

## 2020-01-22 NOTE — PROGRESS NOTE ADULT - ASSESSMENT
88F PMH HTN, HLD, HFrEF (EF 25% from 02/2019), LBBB, pre-diabetes, RLS presents with shortness of breath for one day admitted for severe sepsis secondary to influenza, infiltrate on CXR, and MRSA bacteremia.

## 2020-01-22 NOTE — PROGRESS NOTE ADULT - ASSESSMENT
87 yo F PMHx HTN, HLD, CHF (EF 20%) admitted with sepsis due to influenza, MRSA bacteremia, UTI.   Nephrology consulted for nonoliguric NICOLÁS.      # Nonoliguric NICOLÁS   - baseline Cr 0.9 in 9/2019, 1.62 on admission, slowly trending down, 2.0 today  - likely due to sepsis-related hypoperfusion, hypoxia, poor oral intake/ dehydration  - maintain adequate hydration/ encourage oral intake/ assist with feeds  - appears euvolemic and electrolytes acceptable   - avoid ACE/ARB/NSAIDs/PPIs  - renal diet, daily weight, in/out  - would recommend serial bladder scan Q6hr, Rae placement if >500 cc WITH inability to void and urology consult if that is the case     Will follow

## 2020-01-22 NOTE — PROGRESS NOTE ADULT - PROBLEM SELECTOR PLAN 1
Presented with 3/4 SIRS criteria, febrile 102F (rectal), tachypneic to 22, leukocytosis Wbc 21.03 on admission. Creatinine elevated from baseline of 0.9 with no elevated lacted. Fluids were held because patient was thought to be fluid-overloaded (crackles in the lungs and shortness of breath requiring BIPAP). S/p Solumedrol 120mg IV x1, Zosyn 3.375g IV x1, Vancomycin 1g IV x1. Duoneb x3 in the ED. Trops elevated likely in the setting of demand ischemia now peaked. CXR clear on admission, UA neg, influenza AH3 positive. Found to have new RLL infiltrate on CXR on 01/14. Procalcitonin 83 with new WBC 13.84.   - s/p on tamiflu 30mg BID (started on 01/13 with last day on 01/17)  - f/u urine cultures, currently growing E coli   - MRSA swab positive   - blood cultures positive for MRSA  - f/u on surveillance cultures, currently NGTD  - restarted imdur and coreg 12.5 bid (half home dose)   - Stopped ceftriaxone 1 g IV daily   - stopped vancomycin  - c/w ceftaroline 400 mg q 12 hrs (start date 01/16) and follow-up with ID for abx duration    #MRSA bacteremia. See above  - NPO for DK today  - DNR/DNI rescinded for DK and to restart after the procedure   - cardiology following, appreciate recs Presented with 3/4 SIRS criteria, febrile 102F (rectal), tachypneic to 22, leukocytosis Wbc 21.03 on admission. Creatinine elevated from baseline of 0.9 with no elevated lacted. Fluids were held because patient was thought to be fluid-overloaded (crackles in the lungs and shortness of breath requiring BIPAP). S/p Solumedrol 120mg IV x1, Zosyn 3.375g IV x1, Vancomycin 1g IV x1. Duoneb x3 in the ED. Trops elevated likely in the setting of demand ischemia now peaked. CXR clear on admission, UA neg, influenza AH3 positive. Found to have new RLL infiltrate on CXR on 01/14. Procalcitonin 83 with new WBC 13.84.   - s/p on tamiflu 30mg BID (started on 01/13 with last day on 01/17)  - f/u urine cultures, currently growing E coli   - MRSA swab positive   - blood cultures positive for MRSA  - f/u on surveillance cultures, currently NGTD  - restarted imdur and coreg 12.5 bid (half home dose)   - Stopped ceftriaxone 1 g IV daily   - stopped vancomycin given lokesh  - c/w ceftaroline 400 mg q 12 hrs (start date 01/16) and follow-up with ID for abx duration  - will need 14 days treatement for bacteremia, f/u ID and social work for abx choice considering pricing of ceftaroline vs vanc at [facility]  #MRSA bacteremia. See above  - DK negative  - DNR/DNI reinstated after DK  - cardiology following, appreciate recs

## 2020-01-22 NOTE — PROGRESS NOTE ADULT - PROBLEM SELECTOR PLAN 3
Patient has increased work of breathing intermittently when placed on NC. Now on BiPAP breathing comfortably. Resolving, patient initially required BiPapp, weaned to HFNC. Likely in setting of MRSA PNA and Flu. Now resolved and on room air. Patient has increased work of breathing intermittently when placed on NC. Required BiPAP previously, weaned to HFNC. Likely in setting of MRSA PNA and Flu. Now resolved and on room air.

## 2020-01-23 DIAGNOSIS — I50.22 CHRONIC SYSTOLIC (CONGESTIVE) HEART FAILURE: ICD-10-CM

## 2020-01-23 LAB
ALBUMIN SERPL ELPH-MCNC: 2.6 G/DL — LOW (ref 3.3–5)
ALP SERPL-CCNC: 104 U/L — SIGNIFICANT CHANGE UP (ref 40–120)
ALT FLD-CCNC: 41 U/L — SIGNIFICANT CHANGE UP (ref 10–45)
ANION GAP SERPL CALC-SCNC: 14 MMOL/L — SIGNIFICANT CHANGE UP (ref 5–17)
AST SERPL-CCNC: 37 U/L — SIGNIFICANT CHANGE UP (ref 10–40)
BILIRUB SERPL-MCNC: 0.7 MG/DL — SIGNIFICANT CHANGE UP (ref 0.2–1.2)
BUN SERPL-MCNC: 37 MG/DL — HIGH (ref 7–23)
CALCIUM SERPL-MCNC: 8.1 MG/DL — LOW (ref 8.4–10.5)
CHLORIDE SERPL-SCNC: 114 MMOL/L — HIGH (ref 96–108)
CO2 SERPL-SCNC: 17 MMOL/L — LOW (ref 22–31)
CREAT ?TM UR-MCNC: 77 MG/DL — SIGNIFICANT CHANGE UP
CREAT SERPL-MCNC: 2.03 MG/DL — HIGH (ref 0.5–1.3)
GLUCOSE SERPL-MCNC: 77 MG/DL — SIGNIFICANT CHANGE UP (ref 70–99)
HCT VFR BLD CALC: 42 % — SIGNIFICANT CHANGE UP (ref 34.5–45)
HGB BLD-MCNC: 12.9 G/DL — SIGNIFICANT CHANGE UP (ref 11.5–15.5)
MAGNESIUM SERPL-MCNC: 2.4 MG/DL — SIGNIFICANT CHANGE UP (ref 1.6–2.6)
MCHC RBC-ENTMCNC: 29.7 PG — SIGNIFICANT CHANGE UP (ref 27–34)
MCHC RBC-ENTMCNC: 30.7 GM/DL — LOW (ref 32–36)
MCV RBC AUTO: 96.8 FL — SIGNIFICANT CHANGE UP (ref 80–100)
NRBC # BLD: 0 /100 WBCS — SIGNIFICANT CHANGE UP (ref 0–0)
OSMOLALITY UR: 422 MOSMOL/KG — SIGNIFICANT CHANGE UP (ref 100–650)
PHOSPHATE SERPL-MCNC: 4.1 MG/DL — SIGNIFICANT CHANGE UP (ref 2.5–4.5)
PLATELET # BLD AUTO: 230 K/UL — SIGNIFICANT CHANGE UP (ref 150–400)
POTASSIUM SERPL-MCNC: 4.2 MMOL/L — SIGNIFICANT CHANGE UP (ref 3.5–5.3)
POTASSIUM SERPL-SCNC: 4.2 MMOL/L — SIGNIFICANT CHANGE UP (ref 3.5–5.3)
PROT SERPL-MCNC: 6.8 G/DL — SIGNIFICANT CHANGE UP (ref 6–8.3)
RBC # BLD: 4.34 M/UL — SIGNIFICANT CHANGE UP (ref 3.8–5.2)
RBC # FLD: 13.8 % — SIGNIFICANT CHANGE UP (ref 10.3–14.5)
SODIUM SERPL-SCNC: 145 MMOL/L — SIGNIFICANT CHANGE UP (ref 135–145)
SODIUM UR-SCNC: <20 MMOL/L — SIGNIFICANT CHANGE UP
WBC # BLD: 11.32 K/UL — HIGH (ref 3.8–10.5)
WBC # FLD AUTO: 11.32 K/UL — HIGH (ref 3.8–10.5)

## 2020-01-23 PROCEDURE — 99232 SBSQ HOSP IP/OBS MODERATE 35: CPT

## 2020-01-23 PROCEDURE — 99233 SBSQ HOSP IP/OBS HIGH 50: CPT | Mod: GC

## 2020-01-23 PROCEDURE — 99232 SBSQ HOSP IP/OBS MODERATE 35: CPT | Mod: GC

## 2020-01-23 RX ADMIN — IVABRADINE 5 MILLIGRAM(S): 7.5 TABLET, FILM COATED ORAL at 08:39

## 2020-01-23 RX ADMIN — IVABRADINE 5 MILLIGRAM(S): 7.5 TABLET, FILM COATED ORAL at 17:21

## 2020-01-23 RX ADMIN — GABAPENTIN 100 MILLIGRAM(S): 400 CAPSULE ORAL at 05:44

## 2020-01-23 RX ADMIN — CARVEDILOL PHOSPHATE 12.5 MILLIGRAM(S): 80 CAPSULE, EXTENDED RELEASE ORAL at 05:45

## 2020-01-23 RX ADMIN — ISOSORBIDE MONONITRATE 30 MILLIGRAM(S): 60 TABLET, EXTENDED RELEASE ORAL at 11:04

## 2020-01-23 RX ADMIN — Medication 0.12 MILLIGRAM(S): at 11:04

## 2020-01-23 RX ADMIN — ATORVASTATIN CALCIUM 40 MILLIGRAM(S): 80 TABLET, FILM COATED ORAL at 21:30

## 2020-01-23 RX ADMIN — HEPARIN SODIUM 5000 UNIT(S): 5000 INJECTION INTRAVENOUS; SUBCUTANEOUS at 05:44

## 2020-01-23 RX ADMIN — CARVEDILOL PHOSPHATE 12.5 MILLIGRAM(S): 80 CAPSULE, EXTENDED RELEASE ORAL at 18:04

## 2020-01-23 RX ADMIN — Medication 100 MICROGRAM(S): at 05:44

## 2020-01-23 RX ADMIN — Medication 75 MILLIGRAM(S): at 05:44

## 2020-01-23 RX ADMIN — CEFTAROLINE FOSAMIL 50 MILLIGRAM(S): 600 POWDER, FOR SOLUTION INTRAVENOUS at 17:13

## 2020-01-23 RX ADMIN — HEPARIN SODIUM 5000 UNIT(S): 5000 INJECTION INTRAVENOUS; SUBCUTANEOUS at 17:15

## 2020-01-23 RX ADMIN — Medication 75 MILLIGRAM(S): at 17:15

## 2020-01-23 RX ADMIN — GABAPENTIN 100 MILLIGRAM(S): 400 CAPSULE ORAL at 17:15

## 2020-01-23 RX ADMIN — CEFTAROLINE FOSAMIL 50 MILLIGRAM(S): 600 POWDER, FOR SOLUTION INTRAVENOUS at 05:44

## 2020-01-23 NOTE — PROGRESS NOTE ADULT - SUBJECTIVE AND OBJECTIVE BOX
************** INCOMPLETE NOTE ****************  Infectious Diseases Progress Note:    SUBJECTIVE: Patient seen and examined at bedside. Patient denies fever, chills, HA, nausea, vomiting, abdominal pain, dysuria, diarrhea.    SEPSIS    Sepsis due to methicillin susceptible Staphylococcus aureus (MSSA) without acute organ dysfunction  Acute respiratory failure with hypoxia  MRSA bacteremia  Severe sepsis  Transaminitis  Elevated troponin  NICOLÁS (acute kidney injury)  Transition of care performed with sharing of clinical summary  Prophylactic measure  Dementia  Hyperlipemia  Hypertension  CHF exacerbation  Sepsis      Allergies    No Known Allergies    Intolerances        ANTIBIOTICS/RELEVANT:  antimicrobials  ceftaroline fosamil IVPB 400 milliGRAM(s) IV Intermittent every 12 hours    immunologic:  influenza   Vaccine 0.5 milliLiter(s) IntraMuscular once      OTHER:  atorvastatin 40 milliGRAM(s) Oral daily  carvedilol 12.5 milliGRAM(s) Oral every 12 hours  digoxin     Tablet 0.125 milliGRAM(s) Oral every other day  gabapentin 100 milliGRAM(s) Oral every 12 hours  guaiFENesin   Syrup  (Sugar-Free) 100 milliGRAM(s) Oral every 6 hours PRN  heparin  Injectable 5000 Unit(s) SubCutaneous every 12 hours  isosorbide   mononitrate ER Tablet (IMDUR) 30 milliGRAM(s) Oral daily  ivabradine 5 milliGRAM(s) Oral two times a day  levothyroxine 100 MICROGram(s) Oral daily  venlafaxine 75 milliGRAM(s) Oral every 12 hours      Objective:  Vital Signs Last 24 Hrs  T(C): 36.4 (23 Jan 2020 05:53), Max: 36.8 (22 Jan 2020 12:58)  T(F): 97.5 (23 Jan 2020 05:53), Max: 98.2 (22 Jan 2020 12:58)  HR: 60 (23 Jan 2020 05:53) (49 - 60)  BP: 168/69 (23 Jan 2020 05:53) (127/59 - 168/69)  BP(mean): --  RR: 17 (23 Jan 2020 05:53) (17 - 18)  SpO2: 95% (23 Jan 2020 05:53) (95% - 96%)    PHYSICAL EXAM:  Constitutional: Well-developed, well nourished  Eyes: PERRLA, EOMI  Ear/Nose/Throat: no oral lesion, no sinus tenderness on percussion	  Neck:no JVD, no lymphadenopathy, supple  Respiratory: CTA bilateral  Cardiovascular: S1S2, RRR, no murmurs  Gastrointestinal: soft, NTND, (+) BS, no HSM  Extremities: no c/e/e  Skin: no rashes    LABS:                        12.9   11.32 )-----------( 230      ( 23 Jan 2020 07:16 )             42.0     01-23    145  |  114<H>  |  37<H>  ----------------------------<  77  4.2   |  17<L>  |  2.03<H>    Ca    8.1<L>      23 Jan 2020 07:16  Phos  4.1     01-23  Mg     2.4     01-23    TPro  6.8  /  Alb  2.6<L>  /  TBili  0.7  /  DBili  x   /  AST  37  /  ALT  41  /  AlkPhos  104  01-23          MICROBIOLOGY:            RADIOLOGY & ADDITIONAL STUDIES: Infectious Diseases Progress Note:    SUBJECTIVE: Patient seen and examined at bedside. Somnolent but arousable.      SEPSIS    Sepsis due to methicillin susceptible Staphylococcus aureus (MSSA) without acute organ dysfunction  Acute respiratory failure with hypoxia  MRSA bacteremia  Severe sepsis  Transaminitis  Elevated troponin  NICOLÁS (acute kidney injury)  Transition of care performed with sharing of clinical summary  Prophylactic measure  Dementia  Hyperlipemia  Hypertension  CHF exacerbation  Sepsis      Allergies    No Known Allergies    Intolerances        ANTIBIOTICS/RELEVANT:  antimicrobials  ceftaroline fosamil IVPB 400 milliGRAM(s) IV Intermittent every 12 hours    immunologic:  influenza   Vaccine 0.5 milliLiter(s) IntraMuscular once      OTHER:  atorvastatin 40 milliGRAM(s) Oral daily  carvedilol 12.5 milliGRAM(s) Oral every 12 hours  digoxin     Tablet 0.125 milliGRAM(s) Oral every other day  gabapentin 100 milliGRAM(s) Oral every 12 hours  guaiFENesin   Syrup  (Sugar-Free) 100 milliGRAM(s) Oral every 6 hours PRN  heparin  Injectable 5000 Unit(s) SubCutaneous every 12 hours  isosorbide   mononitrate ER Tablet (IMDUR) 30 milliGRAM(s) Oral daily  ivabradine 5 milliGRAM(s) Oral two times a day  levothyroxine 100 MICROGram(s) Oral daily  venlafaxine 75 milliGRAM(s) Oral every 12 hours      Objective:  Vital Signs Last 24 Hrs  T(C): 36.4 (23 Jan 2020 05:53), Max: 36.8 (22 Jan 2020 12:58)  T(F): 97.5 (23 Jan 2020 05:53), Max: 98.2 (22 Jan 2020 12:58)  HR: 60 (23 Jan 2020 05:53) (49 - 60)  BP: 168/69 (23 Jan 2020 05:53) (127/59 - 168/69)  BP(mean): --  RR: 17 (23 Jan 2020 05:53) (17 - 18)  SpO2: 95% (23 Jan 2020 05:53) (95% - 96%)    PHYSICAL EXAM:  Constitutional: Well-developed, well nourished, somnolent but arousable   Eyes: PERRLA, EOMI  Ear/Nose/Throat: no oral lesion, no sinus tenderness on percussion	  Neck:no JVD, no lymphadenopathy, supple  Respiratory: coarse breath sounds bilaterally   Cardiovascular: S1S2, RRR, no murmurs  Gastrointestinal: soft, NTND, (+) BS, no HSM  Extremities: no c/e/e   Skin: no rashes    LABS:                        12.9   11.32 )-----------( 230      ( 23 Jan 2020 07:16 )             42.0     01-23    145  |  114<H>  |  37<H>  ----------------------------<  77  4.2   |  17<L>  |  2.03<H>    Ca    8.1<L>      23 Jan 2020 07:16  Phos  4.1     01-23  Mg     2.4     01-23    TPro  6.8  /  Alb  2.6<L>  /  TBili  0.7  /  DBili  x   /  AST  37  /  ALT  41  /  AlkPhos  104  01-23          MICROBIOLOGY:  Culture - Blood (01.16.20 @ 17:43)    Specimen Source: .Blood Blood    Culture Results:   No growth at 5 days.              RADIOLOGY & ADDITIONAL STUDIES:

## 2020-01-23 NOTE — PROGRESS NOTE ADULT - PROBLEM SELECTOR PROBLEM 3
Acute respiratory failure with hypoxia R/O CHF exacerbation Chronic systolic congestive heart failure

## 2020-01-23 NOTE — PROGRESS NOTE ADULT - PROBLEM SELECTOR PLAN 1
Presented with 3/4 SIRS criteria, febrile 102F (rectal), tachypneic to 22, leukocytosis Wbc 21.03 on admission. Creatinine elevated from baseline of 0.9 with no elevated lacted. Fluids were held because patient was thought to be fluid-overloaded (crackles in the lungs and shortness of breath requiring BIPAP). S/p Solumedrol 120mg IV x1, Zosyn 3.375g IV x1, Vancomycin 1g IV x1. Duoneb x3 in the ED. Trops elevated likely in the setting of demand ischemia now peaked. CXR clear on admission, UA neg, influenza AH3 positive. Found to have new RLL infiltrate on CXR on 01/14. Procalcitonin 83 with new WBC 13.84.   - f/u urine cultures, currently growing E coli   - blood cultures positive for MRSA  - restarted imdur and coreg 12.5 bid (half home dose)   - c/w ceftaroline 400 mg q 12 hrs (start date 01/16)  - will need 14 days treatement for bacteremia, f/u ID and social work for abx choice considering pricing of ceftaroline vs vanc at [facility]  #MRSA bacteremia. See above  - DK negative  - DNR/DNI reinstated after DK  - cardiology following, appreciate recs Presented with 3/4 SIRS criteria, febrile 102F (rectal), tachypneic to 22, leukocytosis Wbc 21.03 on admission. Creatinine elevated from baseline of 0.9 with no elevated lacted. Fluids were held because patient was thought to be fluid-overloaded (crackles in the lungs and shortness of breath requiring BIPAP). S/p Solumedrol 120mg IV x1, Zosyn 3.375g IV x1, Vancomycin 1g IV x1. Duoneb x3 in the ED. Trops elevated likely in the setting of demand ischemia now peaked. CXR clear on admission, UA neg, influenza AH3 positive. Found to have new RLL infiltrate on CXR on 01/14. Procalcitonin 83 with new WBC 13.84.   - urine cultures with E coli   - blood cultures positive for MRSA  - restarted imdur and coreg 12.5 bid (half home dose)   - c/w ceftaroline 400 mg q 12 hrs (start date 01/16; vanc started 1/13)  - will need 14 days treatement for bacteremia, f/u ID and social work for abx choice considering pricing of ceftaroline vs vanc at [facility]; end abx 1/26

## 2020-01-23 NOTE — CHART NOTE - NSCHARTNOTEFT_GEN_A_CORE
Admitting Diagnosis:   Patient is a 88y old  Female who presents with a chief complaint of sepsis (2020 08:36)      PAST MEDICAL & SURGICAL HISTORY:  HLD (hyperlipidemia)  Hypertension  No significant past surgical history      Current Nutrition Order:DASH and Ensure ENlive x3(1050kcal and 60gmprotein)       PO Intake: Good (%) [   ]  Fair (50-75%) [ x  ] Poor (<25%) [   ]    GI Issues: none    Pain:none    Skin Integrity:intact    Labs:       145  |  114<H>  |  37<H>  ----------------------------<  77  4.2   |  17<L>  |  2.03<H>    Ca    8.1<L>      2020 07:16  Phos  4.1       Mg     2.4         TPro  6.8  /  Alb  2.6<L>  /  TBili  0.7  /  DBili  x   /  AST  37  /  ALT  41  /  AlkPhos  104      CAPILLARY BLOOD GLUCOSE          Medications:  MEDICATIONS  (STANDING):  atorvastatin 40 milliGRAM(s) Oral daily  carvedilol 12.5 milliGRAM(s) Oral every 12 hours  ceftaroline fosamil IVPB 400 milliGRAM(s) IV Intermittent every 12 hours  digoxin     Tablet 0.125 milliGRAM(s) Oral every other day  gabapentin 100 milliGRAM(s) Oral every 12 hours  heparin  Injectable 5000 Unit(s) SubCutaneous every 12 hours  influenza   Vaccine 0.5 milliLiter(s) IntraMuscular once  isosorbide   mononitrate ER Tablet (IMDUR) 30 milliGRAM(s) Oral daily  ivabradine 5 milliGRAM(s) Oral two times a day  levothyroxine 100 MICROGram(s) Oral daily  venlafaxine 75 milliGRAM(s) Oral every 12 hours    MEDICATIONS  (PRN):  guaiFENesin   Syrup  (Sugar-Free) 100 milliGRAM(s) Oral every 6 hours PRN Cough      Weight:  Daily     Daily Weight in k.7 (2020 05:53)    Weight Change: 9lb weight gain since admission 46.4-50.7kg    Estimated energy needs: Based on ABW:(between %):ABW:50.7kg [id number]:1268-1521kcal and 0.8-1gmprotein(pre-renal):40-50.7gm and fluids per team due to CHF    Subjective: 87y/o female with history of HTN,HLP,CHF,pre-DM,present with SOB and severe sepsis 2/2 influenza.Eating slightly improved :50-70% noted.    Previous Nutrition Diagnosis:Inadequate oral intake r/t physiologic state 2/2 acute influenza AEB: 50% of EER    Active [ x  ]  Resolved [   ]Maintain due to fluctuation appetite    If resolved, new PES:     Goal:Meet 75% of EER consistently    Recommendations:1.Daily weights    Education: not indicated    Risk Level: High [   ] Moderate [  x ] Low [   ]

## 2020-01-23 NOTE — PROGRESS NOTE ADULT - ASSESSMENT
89 yo F PMHx HTN, HLD, CHF (EF 20%) admitted with sepsis due to influenza, MRSA bacteremia, UTI.   Nephrology consulted for nonoliguric NICOLÁS.      # Nonoliguric NICOLÁS   - baseline Cr 0.9 in 9/2019, 1.62 on admission, slowly trending down, stable at 2.0   - likely due to sepsis-related hypoperfusion, hypoxia, poor oral intake/ dehydration  - appears dry, maintain adequate hydration/ encourage oral intake/ assist with feeds  - check Ulytes  - electrolytes acceptable   - avoid ACE/ARB/NSAIDs/PPIs  - renal diet, daily weight, in/out  - would recommend serial bladder scan Q6hr, Rae placement if >500 cc WITH inability to void and urology consult if that is the case

## 2020-01-23 NOTE — PROGRESS NOTE ADULT - PROBLEM SELECTOR PLAN 3
Patient has increased work of breathing intermittently when placed on NC. Required BiPAP previously, weaned to HFNC. Likely in setting of MRSA PNA and Flu. Now resolved and on room air. Previous echo on 02/2019 with EF of 25%. Presented with one day history of worsening shortness of breath, crackles on exam, BNP 40,975, which may possibly be CHF exacerbation secondary to the flu. Held the fluids given that she was volume overloaded requiring BIPAP. ABG pH 7.35 PO2 285. Was given IV lasix 20 mg in ED. Now off BiPAP on HFNC breathing comfortably.   - in setting of creatinine, will hold diuretics   - restarted home digoxin 0.125mg every other day  - On ivabradine 5mg twice a day with meals at home (cardiologist Dr. Jamaal Shaw [phone])  - started coreg 12.5 mg BID (half home dose)   - started isosorbide mononitrate 30mg daily  - holding enalapril 10mg BID in setting of NICOLÁS   - daily weights   - strict I+Os

## 2020-01-23 NOTE — PROGRESS NOTE ADULT - PROBLEM SELECTOR PLAN 8
history of hypertension, currently normotensive  - restarted half home dose of coreg (12.5 mg bid)   - restarted home isosorbide mononitrate 30mg daily   - holding home Enalapril 10mg BID in setting of NICOLÁS 1) PCP Contacted on Admission: (Y/N) --> Name & Phone #:  2) Date of Contact with PCP:  3) PCP Contacted at Discharge: (Y/N)  4) Summary of Handoff Given to PCP:   5) Post-Discharge Appointment Date and Location:

## 2020-01-23 NOTE — PROGRESS NOTE ADULT - PROBLEM SELECTOR PLAN 2
- ID following   - see above #MRSA bacteremia. See above  - DK negative  - DNR/DNI reinstated after DK  - cardiology following, appreciate recs  - as above

## 2020-01-23 NOTE — PROGRESS NOTE ADULT - ASSESSMENT
88F PMH HTN, HLD, HFrEF (EF 25% from 02/2019), LBBB, pre-diabetes, RLS presents with shortness of breath for one day admitted for severe sepsis secondary to influenza, infiltrate on CXR, and MRSA bacteremia. Currently stable on nasal cannula and breathing comfortably.       #uncomplicated MRSA bacteremia  - DK negative  - recommend ceftaroline 400 mg Q12 for 2 weeks from date of 1st negative cx. Abx end date: 1/27.     ID   will sign off. Please reconsult if additional questions arise.     Plan discussed with ID attending. 88F PMH HTN, HLD, HFrEF (EF 25% from 02/2019), LBBB, pre-diabetes, RLS presents with shortness of breath for one day admitted for severe sepsis secondary to influenza, infiltrate on CXR, and MRSA bacteremia. Currently stable on nasal cannula and breathing comfortably.       #uncomplicated MRSA bacteremia  - DK negative  - recommend ceftaroline 400 mg Q12 for 2 weeks from date of 1st negative cx. Abx end date: 1/27.   - ID team will arrange outpatient ID follow up appt    ID   will sign off. Please reconsult if additional questions arise.     Plan discussed with ID attending. Declined

## 2020-01-23 NOTE — PROGRESS NOTE ADULT - SUBJECTIVE AND OBJECTIVE BOX
OVERNIGHT EVENTS: NAEO. Bladder scan 10pm was ~250. Nursing noticed pt urinated in bed overnight, 2AM bladder scan with ~200    SUBJECTIVE: Pt seen and examined at bedside. Feeling well. Asking how many days she will be here. Team explained she will likely stay for 3 more days to finish her abx.  Denied fevers, chills, sob, abd pain, dysuria, diarrhea, headaches.    Vital Signs Last 12 Hrs  T(F): 97.5 (01-23-20 @ 05:53), Max: 97.5 (01-23-20 @ 05:53)  HR: 60 (01-23-20 @ 05:53) (60 - 60)  BP: 168/69 (01-23-20 @ 05:53) (168/69 - 168/69)  BP(mean): --  RR: 17 (01-23-20 @ 05:53) (17 - 17)  SpO2: 95% (01-23-20 @ 05:53) (95% - 95%)  I&O's Summary    22 Jan 2020 07:01  -  23 Jan 2020 07:00  --------------------------------------------------------  IN: 400 mL / OUT: 550 mL / NET: -150 mL        PHYSICAL EXAM:  General: NAD, comfortable, lying in bed, calm  HEENT: NCAT, EOMI, clear conjunctiva, no scleral icterus  Neck: supple, no JVD  Respiratory: mild rhonchi in all lung fields; no expiratory wheezes   Cardiovascular: RRR, normal S1S2, soft systolic murmur  Vascular: 2+ radial and DP pulses  Abdomen: soft, NT/ND, no palpable masses  Extremities: WWP, no clubbing, cyanosis, or edema  Skin: No rashes present  Neuro: A&Ox1-2 (self, knows she is at a hospital, does not know year)        LABS:                        12.9   11.32 )-----------( 230      ( 23 Jan 2020 07:16 )             42.0     01-23    145  |  114<H>  |  37<H>  ----------------------------<  77  4.2   |  17<L>  |  2.03<H>    Ca    8.1<L>      23 Jan 2020 07:16  Phos  4.1     01-23  Mg     2.4     01-23    TPro  6.8  /  Alb  2.6<L>  /  TBili  0.7  /  DBili  x   /  AST  37  /  ALT  41  /  AlkPhos  104  01-23          RADIOLOGY & ADDITIONAL TESTS:    MEDICATIONS  (STANDING):  atorvastatin 40 milliGRAM(s) Oral daily  carvedilol 12.5 milliGRAM(s) Oral every 12 hours  ceftaroline fosamil IVPB 400 milliGRAM(s) IV Intermittent every 12 hours  digoxin     Tablet 0.125 milliGRAM(s) Oral every other day  gabapentin 100 milliGRAM(s) Oral every 12 hours  heparin  Injectable 5000 Unit(s) SubCutaneous every 12 hours  influenza   Vaccine 0.5 milliLiter(s) IntraMuscular once  isosorbide   mononitrate ER Tablet (IMDUR) 30 milliGRAM(s) Oral daily  ivabradine 5 milliGRAM(s) Oral two times a day  levothyroxine 100 MICROGram(s) Oral daily  venlafaxine 75 milliGRAM(s) Oral every 12 hours    MEDICATIONS  (PRN):  guaiFENesin   Syrup  (Sugar-Free) 100 milliGRAM(s) Oral every 6 hours PRN Cough

## 2020-01-23 NOTE — PROGRESS NOTE ADULT - SUBJECTIVE AND OBJECTIVE BOX
Physical Medicine and Rehabilitation Progress Note:    Patient is a 88y old  Female who presents with a chief complaint of sepsis (23 Jan 2020 13:51)      HPI:  88F PMH HTN, HLD, and "leaky valves" presents with shortness of breath for one day. Most of the history is provided by patient's niece Pebbles Kan (HCP) at bedside. The niece reports that the patient was in [place] for 3 weeks and got back 4 days ago. During the time the patient was away, she hasn't been taking her medications compliantly because of her forgetfullness. She resumed her medications yesterday. Yesterday morning the patient started feeling short of breath and progressively got worse over time with her breathing becoming more labored. Her niece noticed that by the end of the day she was gasping for air and decided to bring her into the emergency room. The niece says that 4 days ago when she picked her up from the airport, the patient forgot her coat on the plane and was walking in the cold to her car without her coat. Otherwise, she denies any fevers, chills, night sweats, chest pain, abdominal pain, n/v/d/c, sick contacts. She lives with a friend at home and ambulates using a walker.     ED Course:  Vitals: T 102F (rectal) HR 90 /90 RR 22 O2 98% (nonrebreather)  Labs: Wbc 21.03 Hb 14.0 Plt 198 CO2 19 BUN/Cr 22/1.62 Trop 0.07 BNP 40,975 Alkphos 155  ALT 28   CXR Clear, UA neg, EKG LBBB unchanged from prior  Given Tylenol 650mg, Solumedrol 120mg IV x1, Zosyn 3.375g IV x1, Vancomycin 1g IV x1, Duoneb x3, placed on BIPAP (13 Jan 2020 16:04)                            12.9   11.32 )-----------( 230      ( 23 Jan 2020 07:16 )             42.0       01-23    145  |  114<H>  |  37<H>  ----------------------------<  77  4.2   |  17<L>  |  2.03<H>    Ca    8.1<L>      23 Jan 2020 07:16  Phos  4.1     01-23  Mg     2.4     01-23    TPro  6.8  /  Alb  2.6<L>  /  TBili  0.7  /  DBili  x   /  AST  37  /  ALT  41  /  AlkPhos  104  01-23    Vital Signs Last 24 Hrs  T(C): 36.6 (23 Jan 2020 13:10), Max: 36.6 (23 Jan 2020 13:10)  T(F): 97.9 (23 Jan 2020 13:10), Max: 97.9 (23 Jan 2020 13:10)  HR: 54 (23 Jan 2020 13:10) (49 - 60)  BP: 160/71 (23 Jan 2020 13:10) (127/59 - 168/69)  BP(mean): --  RR: 18 (23 Jan 2020 13:10) (17 - 18)  SpO2: 94% (23 Jan 2020 13:10) (94% - 96%)    MEDICATIONS  (STANDING):  atorvastatin 40 milliGRAM(s) Oral daily  carvedilol 12.5 milliGRAM(s) Oral every 12 hours  ceftaroline fosamil IVPB 400 milliGRAM(s) IV Intermittent every 12 hours  digoxin     Tablet 0.125 milliGRAM(s) Oral every other day  gabapentin 100 milliGRAM(s) Oral every 12 hours  heparin  Injectable 5000 Unit(s) SubCutaneous every 12 hours  influenza   Vaccine 0.5 milliLiter(s) IntraMuscular once  isosorbide   mononitrate ER Tablet (IMDUR) 30 milliGRAM(s) Oral daily  ivabradine 5 milliGRAM(s) Oral two times a day  levothyroxine 100 MICROGram(s) Oral daily  venlafaxine 75 milliGRAM(s) Oral every 12 hours    MEDICATIONS  (PRN):  guaiFENesin   Syrup  (Sugar-Free) 100 milliGRAM(s) Oral every 6 hours PRN Cough    Currently Undergoing Physical Therapy at bedside.    Functional Status Assessment:  1/22/2020      Pain Assessment/Number Scale (0-10) Adult  Presence of Pain: complains of pain/discomfort  Body Location: Right:   knee  Radiation To Radiation To: unable to quantify     Therapeutic Interventions      Bed Mobility  Bed Mobility Training Rolling/Turning: minimum assist (75% patient effort);  verbal cues;  nonverbal cues (demo/gestures);  2 person assist;  bed rails  Bed Mobility Training Scooting: moderate assist (50% patient effort);  verbal cues;  nonverbal cues (demo/gestures);  2 person assist;  bed rails  Bed Mobility Training Supine-to-Sit: moderate assist (50% patient effort);  maximum assist (25% patient effort);  verbal cues;  nonverbal cues (demo/gestures);  2 person assist;  bed rails  Bed Mobility Training Limitations: decreased ability to use arms for pushing/pulling;  decreased ability to use legs for bridging/pushing;  impaired ability to control trunk for mobility;  decreased strength;  impaired balance;  pain;  cognitive, decreased safety awareness    Sit-Stand Transfer Training  Sit-to-Stand Transfer Training Symptoms Noted During/After Treatment: increased pain  Transfer Training Sit-to-Stand Transfer: moderate assist (50% patient effort);  maximum assist (25% patient effort);  verbal cues;  nonverbal cues (demo/gestures);  2 person assist;  weight-bearing as tolerated   b/l handheld assist  Transfer Training Stand-to-Sit Transfer: moderate assist (50% patient effort);  verbal cues;  nonverbal cues (demo/gestures);  2 person assist;  weight-bearing as tolerated   b/l handheld assist  Sit-to-Stand Transfer Training Transfer Safety Analysis: decreased balance;  decreased sequencing ability;  decreased step length;  decreased weight-shifting ability;  decreased strength;  impaired balance;  pain;  cognitive, decreased safety awareness;  impaired ability to bear weight on RLE 2/2 pain and swelling in knee    Gait Training  Gait Training Symptoms Noted During/After Treatment: increased pain  Gait Training: maximum assist (25% patient effort);  verbal cues;  nonverbal cues (demo/gestures);  1 person assist;  2 person assist;  weight-bearing as tolerated   b/l UE/trunk support;  stand-pivot transfer bed>chair;  bed to chair  Gait Analysis: decreased pj;  decreased step length;  decreased stride length;  shuffling;  decreased toe clearance;  decreased strength;  impaired balance;  pain;  cognitive, decreased safety awareness;  Bed to Chair  Gait Number of Times:: x 1    Therapeutic Exercise  Therapeutic Exercise Detail: seated: LAQs, ankle pumps, marches x10 each             PM&R Impression: as above    Current Disposition Plan Recommendations: subacute rehab placement

## 2020-01-23 NOTE — PROGRESS NOTE ADULT - PROBLEM SELECTOR PLAN 4
Previous echo on 02/2019 with EF of 25%. Presented with one day history of worsening shortness of breath, crackles on exam, BNP 40,975, which may possibly be CHF exacerbation secondary to the flu. Held the fluids given that she was volume overloaded requiring BIPAP. ABG pH 7.35 PO2 285. Was given IV lasix 20 mg in ED. Now off BiPAP on HFNC breathing comfortably.   - in setting of creatinine, will hold diuretics   - restarted home digoxin 0.125mg every other day  - On ivabradine 5mg twice a day with meals at home (cardiologist Dr. Jamaal Shaw [phone])  - started coreg 12.5 mg BID (half home dose)   - started isosorbide mononitrate 30mg daily  - holding enalapril 10mg BID in setting of NICOLÁS   - daily weights   - strict I+Os presenting with cr 1.62 (baseline 0.9) etiology likely prerenal in the setting of severe sepsis due to flu. Creatinine improving but still elevated to prior baseline (Now 2.12). FENa 0.4 percent.   - avoid nephrotoxic agents  - hold ACE/ARB  - continue to trend CMP daily  - encourage PO

## 2020-01-23 NOTE — PROGRESS NOTE ADULT - PROBLEM SELECTOR PLAN 6
presenting with an elevated troponin of 0.07 in the setting of demand ischemia. EKG with no ST changes, LBBB unchanged from prior EKG. Trop peaked at 0.06. Had CP and SOB on 01/15 with elevated trop to 0.09 that peaked.   - No need to continue to trend history of hypertension, currently normotensive  - restarted half home dose of coreg (12.5 mg bid)   - restarted home isosorbide mononitrate 30mg daily   - holding home Enalapril 10mg BID in setting of NICOLÁS

## 2020-01-23 NOTE — PROGRESS NOTE ADULT - PROBLEM SELECTOR PLAN 7
presenting with alkphos 155 and  likely in the setting of sepsis v hepatic congestion. LFTs now downtrending.   - daily LFTs F: none  E:  Replete K<4, Mg< 2  N: DASH diet

## 2020-01-23 NOTE — PROGRESS NOTE ADULT - SUBJECTIVE AND OBJECTIVE BOX
no acute events overnight  breathing comfortably on RA, sat 95%  c/o increased thirst, dry mouth, asking for water  Cr stable at 2.0  uop 550cc/ 24hr      Meds:  atorvastatin 40 daily  carvedilol 12.5 every 12 hours  ceftaroline fosamil IVPB 400 every 12 hours  digoxin     Tablet 0.125 every other day  gabapentin 100 every 12 hours  guaiFENesin   Syrup  (Sugar-Free) 100 every 6 hours PRN  heparin  Injectable 5000 every 12 hours  influenza   Vaccine 0.5 once  isosorbide   mononitrate ER Tablet (IMDUR) 30 daily  ivabradine 5 two times a day  levothyroxine 100 daily  venlafaxine 75 every 12 hours      T(C): , Max: 36.6 (01-23-20 @ 13:10)  T(F): , Max: 97.9 (01-23-20 @ 13:10)  HR: 54 (01-23-20 @ 13:10)  BP: 160/71 (01-23-20 @ 13:10)  BP(mean): --  RR: 18 (01-23-20 @ 13:10)  SpO2: 94% (01-23-20 @ 13:10)  Wt(kg): --    01-22 @ 07:01  -  01-23 @ 07:00  --------------------------------------------------------  IN: 400 mL / OUT: 550 mL / NET: -150 mL    01-23 @ 07:01 - 01-23 @ 13:52  --------------------------------------------------------  IN: 0 mL / OUT: 210 mL / NET: -210 mL        PHYSICAL EXAM:  GENERAL: alert, no acute distress at present, on RA  NECK: supple, No JVD  CHEST/LUNG: equal breath sounds bilateral  HEART: normal S1S2, RRR  ABDOMEN: Soft, Nontender, +BS, No flank tenderness  EXTREMITIES: No clubbing, cyanosis, or edema, no calf tenderness bilateral  Neurology: alert, no focal neurological deficit, demented   SKIN: No rashes        LABS:                        12.9   11.32 )-----------( 230      ( 23 Jan 2020 07:16 )             42.0     01-23    145  |  114<H>  |  37<H>  ----------------------------<  77  4.2   |  17<L>  |  2.03<H>    Ca    8.1<L>      23 Jan 2020 07:16  Phos  4.1     01-23  Mg     2.4     01-23    TPro  6.8  /  Alb  2.6<L>  /  TBili  0.7  /  DBili  x   /  AST  37  /  ALT  41  /  AlkPhos  104  01-23                RADIOLOGY & ADDITIONAL STUDIES:

## 2020-01-23 NOTE — PROGRESS NOTE ADULT - PROBLEM SELECTOR PLAN 5
presenting with cr 1.62 (baseline 0.9) etiology likely prerenal in the setting of severe sepsis due to flu. Creatinine improving but still elevated to prior baseline (Now 2.12). FENa 0.4 percent.   - avoid nephrotoxic agents  - hold ACE/ARB  - continue to trend CMP daily    #R/o ATN. Presented with NICOLÁS, but UOP   - continue to closely monitor UOP presenting with alkphos 155 and  likely in the setting of sepsis v hepatic congestion. LFTs now downtrending. 1/23 wnl  - daily LFTs

## 2020-01-23 NOTE — PROGRESS NOTE ADULT - ASSESSMENT
per Internal Medicine    88F PMH HTN, HLD, HFrEF (EF 25% from 02/2019), LBBB, pre-diabetes, RLS presents with shortness of breath for one day admitted for severe sepsis secondary to influenza, infiltrate on CXR, and MRSA bacteremia.     Problem/Plan - 1:  ·  Problem: Severe sepsis.  Plan: Presented with 3/4 SIRS criteria, febrile 102F (rectal), tachypneic to 22, leukocytosis Wbc 21.03 on admission. Creatinine elevated from baseline of 0.9 with no elevated lacted. Fluids were held because patient was thought to be fluid-overloaded (crackles in the lungs and shortness of breath requiring BIPAP). S/p Solumedrol 120mg IV x1, Zosyn 3.375g IV x1, Vancomycin 1g IV x1. Duoneb x3 in the ED. Trops elevated likely in the setting of demand ischemia now peaked. CXR clear on admission, UA neg, influenza AH3 positive. Found to have new RLL infiltrate on CXR on 01/14. Procalcitonin 83 with new WBC 13.84.   - urine cultures with E coli   - blood cultures positive for MRSA  - restarted imdur and coreg 12.5 bid (half home dose)   - c/w ceftaroline 400 mg q 12 hrs (start date 01/16; vanc started 1/13)  - will need 14 days treatement for bacteremia, f/u ID and social work for abx choice considering pricing of ceftaroline vs vanc at [facility]; end abx 1/26.     Problem/Plan - 2:  ·  Problem: MRSA bacteremia.  Plan: #MRSA bacteremia. See above  - DK negative  - DNR/DNI reinstated after DK  - cardiology following, appreciate recs  - as above.     Problem/Plan - 3:  ·  Problem: Chronic systolic congestive heart failure. Plan: Previous echo on 02/2019 with EF of 25%. Presented with one day history of worsening shortness of breath, crackles on exam, BNP 40,975, which may possibly be CHF exacerbation secondary to the flu. Held the fluids given that she was volume overloaded requiring BIPAP. ABG pH 7.35 PO2 285. Was given IV lasix 20 mg in ED. Now off BiPAP on HFNC breathing comfortably.   - in setting of creatinine, will hold diuretics   - restarted home digoxin 0.125mg every other day  - On ivabradine 5mg twice a day with meals at home (cardiologist Dr. Jamaal Shaw [phone])  - started coreg 12.5 mg BID (half home dose)   - started isosorbide mononitrate 30mg daily  - holding enalapril 10mg BID in setting of NICOLÁS   - daily weights   - strict I+Os.     Problem/Plan - 4:  ·  Problem: NICOLÁS (acute kidney injury).  Plan: presenting with cr 1.62 (baseline 0.9) etiology likely prerenal in the setting of severe sepsis due to flu. Creatinine improving but still elevated to prior baseline (Now 2.12). FENa 0.4 percent.   - avoid nephrotoxic agents  - hold ACE/ARB  - continue to trend CMP daily  - encourage PO.     Problem/Plan - 5:  ·  Problem: Transaminitis.  Plan: presenting with alkphos 155 and  likely in the setting of sepsis v hepatic congestion. LFTs now downtrending. 1/23 wnl  - daily LFTs.     Problem/Plan - 6:  Problem: Hypertension. Plan: history of hypertension, currently normotensive  - restarted half home dose of coreg (12.5 mg bid)   - restarted home isosorbide mononitrate 30mg daily   - holding home Enalapril 10mg BID in setting of NICOLÁS.    Problem/Plan - 7:  ·  Problem: Prophylactic measure.  Plan: F: none  E:  Replete K<4, Mg< 2  N: DASH diet.

## 2020-01-24 LAB
ANION GAP SERPL CALC-SCNC: 11 MMOL/L — SIGNIFICANT CHANGE UP (ref 5–17)
BUN SERPL-MCNC: 37 MG/DL — HIGH (ref 7–23)
CALCIUM SERPL-MCNC: 8.1 MG/DL — LOW (ref 8.4–10.5)
CHLORIDE SERPL-SCNC: 110 MMOL/L — HIGH (ref 96–108)
CO2 SERPL-SCNC: 23 MMOL/L — SIGNIFICANT CHANGE UP (ref 22–31)
CREAT SERPL-MCNC: 1.97 MG/DL — HIGH (ref 0.5–1.3)
GLUCOSE SERPL-MCNC: 97 MG/DL — SIGNIFICANT CHANGE UP (ref 70–99)
HCT VFR BLD CALC: 38.4 % — SIGNIFICANT CHANGE UP (ref 34.5–45)
HGB BLD-MCNC: 11.7 G/DL — SIGNIFICANT CHANGE UP (ref 11.5–15.5)
MAGNESIUM SERPL-MCNC: 2.2 MG/DL — SIGNIFICANT CHANGE UP (ref 1.6–2.6)
MCHC RBC-ENTMCNC: 29 PG — SIGNIFICANT CHANGE UP (ref 27–34)
MCHC RBC-ENTMCNC: 30.5 GM/DL — LOW (ref 32–36)
MCV RBC AUTO: 95.3 FL — SIGNIFICANT CHANGE UP (ref 80–100)
NRBC # BLD: 0 /100 WBCS — SIGNIFICANT CHANGE UP (ref 0–0)
PHOSPHATE SERPL-MCNC: 3.6 MG/DL — SIGNIFICANT CHANGE UP (ref 2.5–4.5)
PLATELET # BLD AUTO: 249 K/UL — SIGNIFICANT CHANGE UP (ref 150–400)
POTASSIUM SERPL-MCNC: 4.2 MMOL/L — SIGNIFICANT CHANGE UP (ref 3.5–5.3)
POTASSIUM SERPL-SCNC: 4.2 MMOL/L — SIGNIFICANT CHANGE UP (ref 3.5–5.3)
RBC # BLD: 4.03 M/UL — SIGNIFICANT CHANGE UP (ref 3.8–5.2)
RBC # FLD: 13.4 % — SIGNIFICANT CHANGE UP (ref 10.3–14.5)
SODIUM SERPL-SCNC: 144 MMOL/L — SIGNIFICANT CHANGE UP (ref 135–145)
WBC # BLD: 9.51 K/UL — SIGNIFICANT CHANGE UP (ref 3.8–10.5)
WBC # FLD AUTO: 9.51 K/UL — SIGNIFICANT CHANGE UP (ref 3.8–10.5)

## 2020-01-24 PROCEDURE — 99233 SBSQ HOSP IP/OBS HIGH 50: CPT | Mod: GC

## 2020-01-24 PROCEDURE — 99232 SBSQ HOSP IP/OBS MODERATE 35: CPT

## 2020-01-24 RX ORDER — SODIUM CHLORIDE 9 MG/ML
1000 INJECTION, SOLUTION INTRAVENOUS
Refills: 0 | Status: DISCONTINUED | OUTPATIENT
Start: 2020-01-24 | End: 2020-01-24

## 2020-01-24 RX ORDER — DIPHENHYDRAMINE HYDROCHLORIDE AND LIDOCAINE HYDROCHLORIDE AND ALUMINUM HYDROXIDE AND MAGNESIUM HYDRO
5 KIT DAILY
Refills: 0 | Status: DISCONTINUED | OUTPATIENT
Start: 2020-01-24 | End: 2020-01-26

## 2020-01-24 RX ORDER — SODIUM CHLORIDE 9 MG/ML
250 INJECTION, SOLUTION INTRAVENOUS
Refills: 0 | Status: DISCONTINUED | OUTPATIENT
Start: 2020-01-24 | End: 2020-01-24

## 2020-01-24 RX ADMIN — CARVEDILOL PHOSPHATE 12.5 MILLIGRAM(S): 80 CAPSULE, EXTENDED RELEASE ORAL at 18:11

## 2020-01-24 RX ADMIN — ATORVASTATIN CALCIUM 40 MILLIGRAM(S): 80 TABLET, FILM COATED ORAL at 21:41

## 2020-01-24 RX ADMIN — CEFTAROLINE FOSAMIL 50 MILLIGRAM(S): 600 POWDER, FOR SOLUTION INTRAVENOUS at 17:35

## 2020-01-24 RX ADMIN — Medication 100 MICROGRAM(S): at 05:35

## 2020-01-24 RX ADMIN — SODIUM CHLORIDE 60 MILLILITER(S): 9 INJECTION, SOLUTION INTRAVENOUS at 12:22

## 2020-01-24 RX ADMIN — IVABRADINE 5 MILLIGRAM(S): 7.5 TABLET, FILM COATED ORAL at 06:15

## 2020-01-24 RX ADMIN — IVABRADINE 5 MILLIGRAM(S): 7.5 TABLET, FILM COATED ORAL at 17:31

## 2020-01-24 RX ADMIN — HEPARIN SODIUM 5000 UNIT(S): 5000 INJECTION INTRAVENOUS; SUBCUTANEOUS at 05:35

## 2020-01-24 RX ADMIN — GABAPENTIN 100 MILLIGRAM(S): 400 CAPSULE ORAL at 17:34

## 2020-01-24 RX ADMIN — CARVEDILOL PHOSPHATE 12.5 MILLIGRAM(S): 80 CAPSULE, EXTENDED RELEASE ORAL at 05:36

## 2020-01-24 RX ADMIN — ISOSORBIDE MONONITRATE 30 MILLIGRAM(S): 60 TABLET, EXTENDED RELEASE ORAL at 12:21

## 2020-01-24 RX ADMIN — HEPARIN SODIUM 5000 UNIT(S): 5000 INJECTION INTRAVENOUS; SUBCUTANEOUS at 17:30

## 2020-01-24 RX ADMIN — Medication 75 MILLIGRAM(S): at 05:35

## 2020-01-24 RX ADMIN — GABAPENTIN 100 MILLIGRAM(S): 400 CAPSULE ORAL at 05:35

## 2020-01-24 RX ADMIN — CEFTAROLINE FOSAMIL 50 MILLIGRAM(S): 600 POWDER, FOR SOLUTION INTRAVENOUS at 05:35

## 2020-01-24 RX ADMIN — DIPHENHYDRAMINE HYDROCHLORIDE AND LIDOCAINE HYDROCHLORIDE AND ALUMINUM HYDROXIDE AND MAGNESIUM HYDRO 5 MILLILITER(S): KIT at 12:20

## 2020-01-24 RX ADMIN — Medication 75 MILLIGRAM(S): at 17:34

## 2020-01-24 NOTE — PROGRESS NOTE ADULT - ASSESSMENT
per Internal Medicine    88F PMH HTN, HLD, HFrEF (EF 25% from 02/2019), LBBB, pre-diabetes, RLS presents with shortness of breath for one day admitted for severe sepsis secondary to influenza, infiltrate on CXR, and MRSA bacteremia.     Problem/Plan - 1:  ·  Problem: Severe sepsis.  Plan: Presented with 3/4 SIRS criteria, febrile 102F (rectal), tachypneic to 22, leukocytosis Wbc 21.03 on admission. Creatinine elevated from baseline of 0.9 with no elevated lacted. Fluids were held because patient was thought to be fluid-overloaded (crackles in the lungs and shortness of breath requiring BIPAP). S/p Solumedrol 120mg IV x1, Zosyn 3.375g IV x1, Vancomycin 1g IV x1. Duoneb x3 in the ED. Trops elevated likely in the setting of demand ischemia now peaked. CXR clear on admission, UA neg, influenza AH3 positive. Found to have new RLL infiltrate on CXR on 01/14. Procalcitonin 83 with new WBC 13.84.   - urine cultures with E coli   - blood cultures positive for MRSA  - restarted imdur and coreg 12.5 bid (half home dose)   - c/w ceftaroline 400 mg q 12 hrs (start date 01/16; vanc started 1/13)  - will need 14 days treatement for bacteremia, f/u ID and social work for abx choice considering pricing of ceftaroline vs vanc at [facility]; end abx 1/27.     Problem/Plan - 2:  ·  Problem: MRSA bacteremia.  Plan: #MRSA bacteremia. See above  - DK negative  - DNR/DNI reinstated after DK  - cardiology following, appreciate recs  - as above.     Problem/Plan - 3:  ·  Problem: Chronic systolic congestive heart failure.  Plan: Previous echo on 02/2019 with EF of 25%. Presented with one day history of worsening shortness of breath, crackles on exam, BNP 40,975, which may possibly be CHF exacerbation secondary to the flu. Held the fluids given that she was volume overloaded requiring BIPAP. ABG pH 7.35 PO2 285. Was given IV lasix 20 mg in ED. Now off BiPAP on HFNC breathing comfortably.   - in setting of creatinine, will hold diuretics   - restarted home digoxin 0.125mg every other day  - On ivabradine 5mg twice a day with meals at home (cardiologist Dr. Jamaal Shaw [phone])  - started coreg 12.5 mg BID (half home dose)   - started isosorbide mononitrate 30mg daily  - holding enalapril 10mg BID in setting of NICOLÁS   - daily weights   - strict I+Os.     Problem/Plan - 4:  ·  Problem: NICOLÁS (acute kidney injury).  Plan: presenting with cr 1.62 (baseline 0.9) etiology likely prerenal in the setting of severe sepsis due to flu; Fena consistent with pre-renal. Creatinine improving but still elevated to prior baseline (Now 2.12). Also having mouth pain and seems dry on exams 1/23 and 1/24.   - on 1/24 - 250cc's D5 1/2NS. Will ultrasound lungs since holding lasix, will check for extravascular volume ol. Lungs checks and small amounts of fluid ~250 prn.  - soft diet given mouth pain; magic mouth wash, oral care  - avoid nephrotoxic agents  - hold ACE/ARB  - continue to trend CMP daily  - encourage PO.     Problem/Plan - 5:  ·  Problem: Transaminitis.  Plan: resolved.     Problem/Plan - 6:  Problem: Hypertension. Plan: history of hypertension, currently normotensive  - restarted half home dose of coreg (12.5 mg bid)   - restarted home isosorbide mononitrate 30mg daily   - holding home Enalapril 10mg BID in setting of NICOLÁS.    Problem/Plan - 7:  ·  Problem: Prophylactic measure.  Plan: F: none  E:  Replete K<4, Mg< 2  N: DASH diet.

## 2020-01-24 NOTE — PROGRESS NOTE ADULT - PROBLEM SELECTOR PLAN 4
presenting with cr 1.62 (baseline 0.9) etiology likely prerenal in the setting of severe sepsis due to flu; Fena consistent with pre-renal. Creatinine improving but still elevated to prior baseline (Now 2.12). Also having mouth pain and seems dry on exams 1/23 and 1/24.   - on 1/24 - 250cc's D5 1/2NS. Will ultrasound lungs since holding lasix, will check for extravascular volume ol. Lungs checks and small amounts of fluid ~250 prn.  - soft diet given mouth pain; magic mouth wash, oral care  - avoid nephrotoxic agents  - hold ACE/ARB  - continue to trend CMP daily  - encourage PO

## 2020-01-24 NOTE — PROGRESS NOTE ADULT - PROBLEM SELECTOR PLAN 1
Presented with 3/4 SIRS criteria, febrile 102F (rectal), tachypneic to 22, leukocytosis Wbc 21.03 on admission. Creatinine elevated from baseline of 0.9 with no elevated lacted. Fluids were held because patient was thought to be fluid-overloaded (crackles in the lungs and shortness of breath requiring BIPAP). S/p Solumedrol 120mg IV x1, Zosyn 3.375g IV x1, Vancomycin 1g IV x1. Duoneb x3 in the ED. Trops elevated likely in the setting of demand ischemia now peaked. CXR clear on admission, UA neg, influenza AH3 positive. Found to have new RLL infiltrate on CXR on 01/14. Procalcitonin 83 with new WBC 13.84.   - urine cultures with E coli   - blood cultures positive for MRSA  - restarted imdur and coreg 12.5 bid (half home dose)   - c/w ceftaroline 400 mg q 12 hrs (start date 01/16; vanc started 1/13)  - will need 14 days treatement for bacteremia, f/u ID and social work for abx choice considering pricing of ceftaroline vs vanc at [facility]; end abx 1/27

## 2020-01-24 NOTE — PROGRESS NOTE ADULT - PROBLEM SELECTOR PLAN 3
Previous echo on 02/2019 with EF of 25%. Presented with one day history of worsening shortness of breath, crackles on exam, BNP 40,975, which may possibly be CHF exacerbation secondary to the flu. Held the fluids given that she was volume overloaded requiring BIPAP. ABG pH 7.35 PO2 285. Was given IV lasix 20 mg in ED. Now off BiPAP on HFNC breathing comfortably.   - in setting of creatinine, will hold diuretics   - restarted home digoxin 0.125mg every other day  - On ivabradine 5mg twice a day with meals at home (cardiologist Dr. Jamaal Shaw [phone])  - started coreg 12.5 mg BID (half home dose)   - started isosorbide mononitrate 30mg daily  - holding enalapril 10mg BID in setting of NICOLÁS   - daily weights   - strict I+Os

## 2020-01-24 NOTE — PROGRESS NOTE ADULT - ATTENDING COMMENTS
Cr plateaued, check urine lytes, reviewed bedside bladder scan, no urinary retention
Cr stable 2.9 for last few days, baseline low 2's, Ur Na <20 s/p 250cc NS bolus yesterday
NICOLÁS with Cr continuing to improve. Pt is incontinent with bladder scans intermittently 300, today 500, however no clear pathologic urinary retention as we are straight cathing these #s. Recommend serial bladder scans q6hrs, place chou if ?500 WITH inability void and urology consult if that is the case
Pt more alert today, complaining of mouth/tongue pain that is preventing her from eating solid food (entire breakfast uneaten however she'd finished her tea and a glass of water), tongue ulcers noted on exam. Given her poor PO intake, slightly high serum Na and urine Na <20, she may need some maintenance IVF with D51/2NS, monitor her PO intake for today, we encouraged her to continue to drink fluids which cause her less pain than the solid food, suggest Nepro shakes as well
In addition to the above, will arrange outpatient f/u with me in 2 weeks.
Poor PO intake 2/2 to tongue blister. Start magic mouthwash and improve oral hygiene.
Continues to improve  At RA at time of visit, being fed lunch by nurse, no complaints  DK tomorrow per ID recs, c/w ceftaroline  NICOLÁS continues to improve  Rest as above
DK negative, cont ceftaroline, planning for midline/picc with home infusion.

## 2020-01-24 NOTE — PROGRESS NOTE ADULT - ASSESSMENT
87 yo F PMHx HTN, HLD, CHF (EF 20%) admitted with sepsis due to influenza, MRSA bacteremia, UTI.   Nephrology consulted for nonoliguric NICOLÁS.      # Nonoliguric NICOLÁS   - baseline Cr 0.9 in 9/2019, 1.62 on admission, slowly trending down, stable at 2.0   - likely due to sepsis-related hypoperfusion, hypoxia, poor oral intake/ dehydration  - given poor oral intake, c/o pain in the mouth, would recommend D5W 250 cc bolus   - maintain adequate hydration/ encourage oral intake/ assist with feeds and please address pain in the mouth   - electrolytes acceptable   - avoid ACE/ARB/NSAIDs/PPIs  - renal diet, daily weight, in/out

## 2020-01-24 NOTE — PROGRESS NOTE ADULT - SUBJECTIVE AND OBJECTIVE BOX
Physical Medicine and Rehabilitation Progress Note:    Patient is a 88y old  Female who presents with a chief complaint of sepsis (24 Jan 2020 13:07)      HPI:  88F PMH HTN, HLD, and "leaky valves" presents with shortness of breath for one day. Most of the history is provided by patient's niece Pebbles Kan (HCP) at bedside. The niece reports that the patient was in [place] for 3 weeks and got back 4 days ago. During the time the patient was away, she hasn't been taking her medications compliantly because of her forgetfullness. She resumed her medications yesterday. Yesterday morning the patient started feeling short of breath and progressively got worse over time with her breathing becoming more labored. Her niece noticed that by the end of the day she was gasping for air and decided to bring her into the emergency room. The niece says that 4 days ago when she picked her up from the airport, the patient forgot her coat on the plane and was walking in the cold to her car without her coat. Otherwise, she denies any fevers, chills, night sweats, chest pain, abdominal pain, n/v/d/c, sick contacts. She lives with a friend at home and ambulates using a walker.     ED Course:  Vitals: T 102F (rectal) HR 90 /90 RR 22 O2 98% (nonrebreather)  Labs: Wbc 21.03 Hb 14.0 Plt 198 CO2 19 BUN/Cr 22/1.62 Trop 0.07 BNP 40,975 Alkphos 155  ALT 28   CXR Clear, UA neg, EKG LBBB unchanged from prior  Given Tylenol 650mg, Solumedrol 120mg IV x1, Zosyn 3.375g IV x1, Vancomycin 1g IV x1, Duoneb x3, placed on BIPAP (13 Jan 2020 16:04)                            11.7   9.51  )-----------( 249      ( 24 Jan 2020 07:25 )             38.4       01-24    144  |  110<H>  |  37<H>  ----------------------------<  97  4.2   |  23  |  1.97<H>    Ca    8.1<L>      24 Jan 2020 07:25  Phos  3.6     01-24  Mg     2.2     01-24    TPro  6.8  /  Alb  2.6<L>  /  TBili  0.7  /  DBili  x   /  AST  37  /  ALT  41  /  AlkPhos  104  01-23    Vital Signs Last 24 Hrs  T(C): 36.7 (24 Jan 2020 13:58), Max: 37.1 (24 Jan 2020 05:43)  T(F): 98 (24 Jan 2020 13:58), Max: 98.7 (24 Jan 2020 05:43)  HR: 56 (24 Jan 2020 13:58) (55 - 56)  BP: 136/70 (24 Jan 2020 13:58) (102/52 - 157/55)  BP(mean): --  RR: 19 (24 Jan 2020 13:58) (18 - 19)  SpO2: 94% (24 Jan 2020 13:58) (92% - 94%)    MEDICATIONS  (STANDING):  atorvastatin 40 milliGRAM(s) Oral daily  carvedilol 12.5 milliGRAM(s) Oral every 12 hours  ceftaroline fosamil IVPB 400 milliGRAM(s) IV Intermittent every 12 hours  digoxin     Tablet 0.125 milliGRAM(s) Oral every other day  FIRST- Mouthwash  BLM 5 milliLiter(s) Swish and Spit daily  gabapentin 100 milliGRAM(s) Oral every 12 hours  heparin  Injectable 5000 Unit(s) SubCutaneous every 12 hours  influenza   Vaccine 0.5 milliLiter(s) IntraMuscular once  isosorbide   mononitrate ER Tablet (IMDUR) 30 milliGRAM(s) Oral daily  ivabradine 5 milliGRAM(s) Oral two times a day  levothyroxine 100 MICROGram(s) Oral daily  venlafaxine 75 milliGRAM(s) Oral every 12 hours    MEDICATIONS  (PRN):  guaiFENesin   Syrup  (Sugar-Free) 100 milliGRAM(s) Oral every 6 hours PRN Cough    Currently Undergoing Physical Therapy at bedside.    Functional Status Assessment:      Therapeutic Interventions      Bed Mobility  Bed Mobility Training Scooting: moderate assist (50% patient effort);  1 person assist;  verbal cues;  nonverbal cues (demo/gestures)  Bed Mobility Training Sit-to-Supine: moderate assist (50% patient effort);  1 person assist;  verbal cues;  nonverbal cues (demo/gestures)  Bed Mobility Training Supine-to-Sit: moderate assist (50% patient effort);  2 person assist;  verbal cues;  nonverbal cues (demo/gestures)  Bed Mobility Training Limitations: decreased ability to use arms for pushing/pulling;  decreased ability to use legs for bridging/pushing;  impaired ability to control trunk for mobility;  decreased strength;  impaired balance;  impaired postural control    Sit-Stand Transfer Training  Transfer Training Sit-to-Stand Transfer: moderate assist (50% patient effort);  2 person assist;  verbal cues;  nonverbal cues (demo/gestures);  b/l HHA ;  2 trials   Transfer Training Stand-to-Sit Transfer: moderate assist (50% patient effort);  2 person assist;  verbal cues;  nonverbal cues (demo/gestures);  b/l HHA   Sit-to-Stand Transfer Training Transfer Safety Analysis: decreased balance;  decreased strength;  impaired balance;  impaired postural control    Gait Training  Gait Training: moderate assist (50% patient effort);  2 person assist;  verbal cues;  nonverbal cues (demo/gestures);  b/l HHA ;  4 side steps   Gait Analysis: unsteady gait, no lose of balance;  increased time in double stance;  decreased weight-shifting ability;  decreased strength;  impaired balance;  impaired postural control;  pain;  Pt attempted to sit down after taking 4 side steps.     Therapeutic Exercise  Therapeutic Exercise Detail: Seated ther ex: LAQ, hip flexion, ankle pump x 10 bilaterally.shoulder flexion/extension, elbow flexion/extension x 10 bilaterally. Pt stood with b/l HHA with mod A x 2 for ~20 seconds x 2 trials, pt able to take 4 side steps with b/l HHA with mod A x 2 on 2nd trial           PM&R Impression: as above    Current Disposition Plan Recommendations: subacute rehab placement

## 2020-01-24 NOTE — PROGRESS NOTE ADULT - PROBLEM SELECTOR PLAN 2
#MRSA bacteremia. See above  - DK negative  - DNR/DNI reinstated after DK  - cardiology following, appreciate recs  - as above

## 2020-01-24 NOTE — PROGRESS NOTE ADULT - SUBJECTIVE AND OBJECTIVE BOX
OVERNIGHT EVENTS: NAEO, patient urinated overnight    SUBJECTIVE: Pt seen and examined at bedside. Asking to drink some water. Complaining of some mouth pain.   Denies fcnv, cp, sob, abd pain, dysuria, diarrhea, headaches.    Vital Signs Last 12 Hrs  T(F): 98.7 (01-24-20 @ 05:43), Max: 98.7 (01-24-20 @ 05:43)  HR: 55 (01-24-20 @ 05:43) (55 - 55)  BP: 157/55 (01-24-20 @ 05:43) (157/55 - 157/55)  BP(mean): --  RR: 19 (01-24-20 @ 05:43) (19 - 19)  SpO2: 93% (01-24-20 @ 05:43) (93% - 93%)  I&O's Summary    23 Jan 2020 07:01  -  24 Jan 2020 07:00  --------------------------------------------------------  IN: 0 mL / OUT: 770 mL / NET: -770 mL    PHYSICAL EXAM:  General: NAD, comfortable, lying in bed, calm  HEENT: NCAT, EOMI, clear conjunctiva, no scleral icterus. Oral ulcers, poor dentition  Neck: supple, no JVD  Respiratory: mild rhonchi in all lung fields; no expiratory wheezes   Cardiovascular: RRR, normal S1S2, soft systolic murmur  Vascular: 2+ radial and DP pulses  Abdomen: soft, NT/ND, no palpable masses  Extremities: WWP, no clubbing, cyanosis, or edema  Skin: No rashes present  Neuro: A&Ox1-2 (self, knows she is at a hospital, does not know year)        LABS:                        11.7   9.51  )-----------( 249      ( 24 Jan 2020 07:25 )             38.4     01-24    144  |  110<H>  |  37<H>  ----------------------------<  97  4.2   |  23  |  1.97<H>    Ca    8.1<L>      24 Jan 2020 07:25  Phos  3.6     01-24  Mg     2.2     01-24    TPro  6.8  /  Alb  2.6<L>  /  TBili  0.7  /  DBili  x   /  AST  37  /  ALT  41  /  AlkPhos  104  01-23          RADIOLOGY & ADDITIONAL TESTS:    MEDICATIONS  (STANDING):  atorvastatin 40 milliGRAM(s) Oral daily  carvedilol 12.5 milliGRAM(s) Oral every 12 hours  ceftaroline fosamil IVPB 400 milliGRAM(s) IV Intermittent every 12 hours  dextrose 5% + sodium chloride 0.45%. 1000 milliLiter(s) (60 mL/Hr) IV Continuous <Continuous>  digoxin     Tablet 0.125 milliGRAM(s) Oral every other day  FIRST- Mouthwash  BLM 5 milliLiter(s) Swish and Spit daily  gabapentin 100 milliGRAM(s) Oral every 12 hours  heparin  Injectable 5000 Unit(s) SubCutaneous every 12 hours  influenza   Vaccine 0.5 milliLiter(s) IntraMuscular once  isosorbide   mononitrate ER Tablet (IMDUR) 30 milliGRAM(s) Oral daily  ivabradine 5 milliGRAM(s) Oral two times a day  levothyroxine 100 MICROGram(s) Oral daily  venlafaxine 75 milliGRAM(s) Oral every 12 hours    MEDICATIONS  (PRN):  guaiFENesin   Syrup  (Sugar-Free) 100 milliGRAM(s) Oral every 6 hours PRN Cough

## 2020-01-25 LAB
ANION GAP SERPL CALC-SCNC: 9 MMOL/L — SIGNIFICANT CHANGE UP (ref 5–17)
BUN SERPL-MCNC: 32 MG/DL — HIGH (ref 7–23)
CALCIUM SERPL-MCNC: 7.6 MG/DL — LOW (ref 8.4–10.5)
CHLORIDE SERPL-SCNC: 107 MMOL/L — SIGNIFICANT CHANGE UP (ref 96–108)
CO2 SERPL-SCNC: 21 MMOL/L — LOW (ref 22–31)
CREAT SERPL-MCNC: 1.82 MG/DL — HIGH (ref 0.5–1.3)
GLUCOSE SERPL-MCNC: 89 MG/DL — SIGNIFICANT CHANGE UP (ref 70–99)
HCT VFR BLD CALC: 35.7 % — SIGNIFICANT CHANGE UP (ref 34.5–45)
HGB BLD-MCNC: 11.4 G/DL — LOW (ref 11.5–15.5)
MAGNESIUM SERPL-MCNC: 2.1 MG/DL — SIGNIFICANT CHANGE UP (ref 1.6–2.6)
MCHC RBC-ENTMCNC: 29.6 PG — SIGNIFICANT CHANGE UP (ref 27–34)
MCHC RBC-ENTMCNC: 31.9 GM/DL — LOW (ref 32–36)
MCV RBC AUTO: 92.7 FL — SIGNIFICANT CHANGE UP (ref 80–100)
NRBC # BLD: 0 /100 WBCS — SIGNIFICANT CHANGE UP (ref 0–0)
PHOSPHATE SERPL-MCNC: 3.4 MG/DL — SIGNIFICANT CHANGE UP (ref 2.5–4.5)
PLATELET # BLD AUTO: 216 K/UL — SIGNIFICANT CHANGE UP (ref 150–400)
POTASSIUM SERPL-MCNC: 3.9 MMOL/L — SIGNIFICANT CHANGE UP (ref 3.5–5.3)
POTASSIUM SERPL-SCNC: 3.9 MMOL/L — SIGNIFICANT CHANGE UP (ref 3.5–5.3)
RBC # BLD: 3.85 M/UL — SIGNIFICANT CHANGE UP (ref 3.8–5.2)
RBC # FLD: 13.3 % — SIGNIFICANT CHANGE UP (ref 10.3–14.5)
SODIUM SERPL-SCNC: 137 MMOL/L — SIGNIFICANT CHANGE UP (ref 135–145)
WBC # BLD: 7.96 K/UL — SIGNIFICANT CHANGE UP (ref 3.8–10.5)
WBC # FLD AUTO: 7.96 K/UL — SIGNIFICANT CHANGE UP (ref 3.8–10.5)

## 2020-01-25 PROCEDURE — 99233 SBSQ HOSP IP/OBS HIGH 50: CPT | Mod: GC

## 2020-01-25 RX ADMIN — ATORVASTATIN CALCIUM 40 MILLIGRAM(S): 80 TABLET, FILM COATED ORAL at 21:42

## 2020-01-25 RX ADMIN — GABAPENTIN 100 MILLIGRAM(S): 400 CAPSULE ORAL at 17:45

## 2020-01-25 RX ADMIN — CEFTAROLINE FOSAMIL 50 MILLIGRAM(S): 600 POWDER, FOR SOLUTION INTRAVENOUS at 06:43

## 2020-01-25 RX ADMIN — IVABRADINE 5 MILLIGRAM(S): 7.5 TABLET, FILM COATED ORAL at 06:43

## 2020-01-25 RX ADMIN — HEPARIN SODIUM 5000 UNIT(S): 5000 INJECTION INTRAVENOUS; SUBCUTANEOUS at 06:42

## 2020-01-25 RX ADMIN — Medication 100 MICROGRAM(S): at 06:43

## 2020-01-25 RX ADMIN — Medication 75 MILLIGRAM(S): at 17:48

## 2020-01-25 RX ADMIN — GABAPENTIN 100 MILLIGRAM(S): 400 CAPSULE ORAL at 06:43

## 2020-01-25 RX ADMIN — DIPHENHYDRAMINE HYDROCHLORIDE AND LIDOCAINE HYDROCHLORIDE AND ALUMINUM HYDROXIDE AND MAGNESIUM HYDRO 5 MILLILITER(S): KIT at 11:44

## 2020-01-25 RX ADMIN — CARVEDILOL PHOSPHATE 12.5 MILLIGRAM(S): 80 CAPSULE, EXTENDED RELEASE ORAL at 06:43

## 2020-01-25 RX ADMIN — HEPARIN SODIUM 5000 UNIT(S): 5000 INJECTION INTRAVENOUS; SUBCUTANEOUS at 17:45

## 2020-01-25 RX ADMIN — Medication 75 MILLIGRAM(S): at 06:44

## 2020-01-25 RX ADMIN — IVABRADINE 5 MILLIGRAM(S): 7.5 TABLET, FILM COATED ORAL at 17:45

## 2020-01-25 RX ADMIN — CEFTAROLINE FOSAMIL 50 MILLIGRAM(S): 600 POWDER, FOR SOLUTION INTRAVENOUS at 18:02

## 2020-01-25 NOTE — PROGRESS NOTE ADULT - SUBJECTIVE AND OBJECTIVE BOX
OVERNIGHT EVENTS: No acute events overnight.    SUBJECTIVE/INTERVAL HPI: Patient was seen and examined at bedside this morning. Denies any discomfort, no CP, SOB, abdominal pain.    VITALS  Vital Signs Last 24 Hrs  T(C): 36.8 (25 Jan 2020 05:28), Max: 36.8 (25 Jan 2020 05:28)  T(F): 98.2 (25 Jan 2020 05:28), Max: 98.2 (25 Jan 2020 05:28)  HR: 57 (25 Jan 2020 05:28) (53 - 57)  BP: 154/61 (25 Jan 2020 05:28) (126/65 - 154/61)  BP(mean): --  RR: 18 (25 Jan 2020 05:28) (18 - 19)  SpO2: 96% (25 Jan 2020 05:28) (94% - 96%)    I&O's Summary    24 Jan 2020 07:01  -  25 Jan 2020 07:00  --------------------------------------------------------  IN: 0 mL / OUT: 175 mL / NET: -175 mL        CAPILLARY BLOOD GLUCOSE          PHYSICAL EXAM  General: NAD, comfortable, lying in bed, calm  HEENT: NCAT, EOMI, clear conjunctiva, no scleral icterus. Oral ulcers, poor dentition  Neck: supple, no JVD  Respiratory: CTAB  Cardiovascular: RRR, normal S1S2, soft systolic murmur  Vascular: 2+ radial and DP pulses  Abdomen: soft, NT/ND, no palpable masses  Extremities: WWP, no clubbing, cyanosis, or edema  Skin: No rashes present  Neuro: A&Ox1-2    MEDICATIONS  (STANDING):  atorvastatin 40 milliGRAM(s) Oral daily  carvedilol 12.5 milliGRAM(s) Oral every 12 hours  ceftaroline fosamil IVPB 400 milliGRAM(s) IV Intermittent every 12 hours  digoxin     Tablet 0.125 milliGRAM(s) Oral every other day  FIRST- Mouthwash  BLM 5 milliLiter(s) Swish and Spit daily  gabapentin 100 milliGRAM(s) Oral every 12 hours  heparin  Injectable 5000 Unit(s) SubCutaneous every 12 hours  influenza   Vaccine 0.5 milliLiter(s) IntraMuscular once  isosorbide   mononitrate ER Tablet (IMDUR) 30 milliGRAM(s) Oral daily  ivabradine 5 milliGRAM(s) Oral two times a day  levothyroxine 100 MICROGram(s) Oral daily  venlafaxine 75 milliGRAM(s) Oral every 12 hours    MEDICATIONS  (PRN):  guaiFENesin   Syrup  (Sugar-Free) 100 milliGRAM(s) Oral every 6 hours PRN Cough      LABS                        11.4   7.96  )-----------( 216      ( 25 Jan 2020 05:34 )             35.7     01-25    137  |  107  |  32<H>  ----------------------------<  89  3.9   |  21<L>  |  1.82<H>    Ca    7.6<L>      25 Jan 2020 05:34  Phos  3.4     01-25  Mg     2.1     01-25                  Radiology and other tests: Reviewed.

## 2020-01-25 NOTE — PROGRESS NOTE ADULT - PROBLEM SELECTOR PLAN 1
Presented with 3/4 SIRS criteria, febrile 102F (rectal), tachypneic to 22, leukocytosis Wbc 21.03 on admission. Creatinine elevated from baseline of 0.9 with no elevated lacted. Fluids were held because patient was thought to be fluid-overloaded (crackles in the lungs and shortness of breath requiring BIPAP). S/p Solumedrol 120mg IV x1, Zosyn 3.375g IV x1, Vancomycin 1g IV x1. Duoneb x3 in the ED. Trops elevated likely in the setting of demand ischemia now peaked. CXR clear on admission, UA neg, influenza AH3 positive. Found to have new RLL infiltrate on CXR on 01/14. Procalcitonin 83 with new WBC 13.84.   - urine cultures with E coli   - blood cultures positive for MRSA  - restarted imdur and coreg 12.5 bid (half home dose)   - c/w ceftaroline 400 mg q 12 hrs (start date 01/16; vanc started 1/13)  - will need 14 days treatement for bacteremia, f/u ID and social work for abx choice considering pricing of ceftaroline vs vanc at [facility]; end abx 1/27

## 2020-01-25 NOTE — PROGRESS NOTE ADULT - SUBJECTIVE AND OBJECTIVE BOX
CC: SEPSIS      INTERVAL HISTORY: resting comfortably in bed  non-conversant      ROS: No chest pain, no sob, no abd pain. No n/v/d    PAST MEDICAL & SURGICAL HISTORY:  HLD (hyperlipidemia)  Hypertension  No significant past surgical history      PHYSICAL EXAM:  T(C): 36.8 (01-25-20 @ 05:28), Max: 36.8 (01-25-20 @ 05:28)  HR: 57 (01-25-20 @ 05:28)  BP: 154/61 (01-25-20 @ 05:28) (126/65 - 154/61)  RR: 18 (01-25-20 @ 05:28)  SpO2: 96% (01-25-20 @ 05:28)  Wt(kg): --  I&O's Summary    24 Jan 2020 07:01  -  25 Jan 2020 07:00  --------------------------------------------------------  IN: 0 mL / OUT: 175 mL / NET: -175 mL      Weight   General,  NAD.  HEENT: moist mucous membranes, no pallor/cyanosis.  Neck: no JVD visible.  Cardiac: S1, S2. RRR. No murmurs   Respratory: CTA b/l, no access muscle use.   Abdomen: soft. nontender. nondistended  Skin: no rashes.  Extremities: no LE edema b/l  Access:       DATA:                        11.4<L>  7.96  )-----------( 216      ( 25 Jan 2020 05:34 )             35.7         137    |  107    |  32<H>  ----------------------------<  89     Ca:7.6<L> (25 Jan 2020 05:34)  3.9     |  21<L>  |  1.82<H>      eGFR if Non : 24 <L>  eGFR if : 28 <L>    TPro  6.8    /  Alb  2.6<L>  /  TBili  0.7    /  DBili  x      /  AST  37     /  ALT  41     /  AlkPhos  104    23 Jan 2020 07:16          Creatinine, Random Urine: 77 mg/dL (01-23 @ 19:22)  Sodium, Random Urine: <20 mmol/L (01-23 @ 19:22)  Osmolality, Random Urine: 422 mosmol/kg (01-23 @ 19:22)            MEDICATIONS  (STANDING):  atorvastatin 40 milliGRAM(s) Oral daily  carvedilol 12.5 milliGRAM(s) Oral every 12 hours  ceftaroline fosamil IVPB 400 milliGRAM(s) IV Intermittent every 12 hours  digoxin     Tablet 0.125 milliGRAM(s) Oral every other day  FIRST- Mouthwash  BLM 5 milliLiter(s) Swish and Spit daily  gabapentin 100 milliGRAM(s) Oral every 12 hours  heparin  Injectable 5000 Unit(s) SubCutaneous every 12 hours  influenza   Vaccine 0.5 milliLiter(s) IntraMuscular once  isosorbide   mononitrate ER Tablet (IMDUR) 30 milliGRAM(s) Oral daily  ivabradine 5 milliGRAM(s) Oral two times a day  levothyroxine 100 MICROGram(s) Oral daily  venlafaxine 75 milliGRAM(s) Oral every 12 hours    MEDICATIONS  (PRN):  guaiFENesin   Syrup  (Sugar-Free) 100 milliGRAM(s) Oral every 6 hours PRN Cough

## 2020-01-25 NOTE — SWALLOW BEDSIDE ASSESSMENT ADULT - SLP PERTINENT HISTORY OF CURRENT PROBLEM
HTN, HLD, HFrEF (EF 25% from 02/2019), LBBB, pre-diabetes, RLS presents with shortness of breath for one day admitted for severe sepsis secondary to influenza, infiltrate on CXR, and MRSA bacteremia.

## 2020-01-25 NOTE — SWALLOW BEDSIDE ASSESSMENT ADULT - SWALLOW EVAL: RECOMMENDED FEEDING/EATING TECHNIQUES
maintain upright posture during/after eating for 30 mins/check mouth frequently for oral residue/pocketing/position upright (90 degrees)/small sips/bites

## 2020-01-25 NOTE — PROGRESS NOTE ADULT - PROBLEM SELECTOR PLAN 1
resolving acute on chronic renal failure  continue treatment for sepsis  can restart ACEI once back to baseline S[place]

## 2020-01-25 NOTE — PROGRESS NOTE ADULT - PROBLEM SELECTOR PLAN 3
Previous echo on 02/2019 with EF of 25%. Presented with one day history of worsening shortness of breath, crackles on exam, BNP 40,975, which may possibly be CHF exacerbation secondary to the flu. Held the fluids given that she was volume overloaded requiring BIPAP. ABG pH 7.35 PO2 285. Was given IV lasix 20 mg in ED. Now off BiPAP on HFNC breathing comfortably.   - in setting of creatinine, will hold diuretics   - restarted home digoxin 0.125mg every other day  - On ivabradine 5mg twice a day with meals at home (cardiologist Dr. Jamaal Shaw [phone])  - started coreg 12.5 mg BID (half home dose)   - started isosorbide mononitrate 30mg daily  - holding enalapril 10mg BID in setting of NICOLÁS   - daily weights   - strict I+Os

## 2020-01-25 NOTE — SWALLOW BEDSIDE ASSESSMENT ADULT - SWALLOW EVAL: DIAGNOSIS
Pt presents with moderate oral phase dysphagia likely exacerbated by pain 2/2 mouth ulcers/sores. Pt with inefficient mastication of solids, bolus holding, and delayed A-P transport. Suspect pharyngeal phase to be WFL. Recommend a puree diet with thin liquids. Aspiration precautions in place as pt is dependent on others for feeding.

## 2020-01-25 NOTE — PROGRESS NOTE ADULT - PROBLEM SELECTOR PLAN 6
history of hypertension, currently normotensive  - c/w half home dose of coreg (12.5 mg bid)   - c/w home isosorbide mononitrate 30mg daily   - holding home Enalapril 10mg BID in setting of NICOLÁS

## 2020-01-26 LAB
ANION GAP SERPL CALC-SCNC: 11 MMOL/L — SIGNIFICANT CHANGE UP (ref 5–17)
APPEARANCE UR: CLEAR — SIGNIFICANT CHANGE UP
BILIRUB UR-MCNC: NEGATIVE — SIGNIFICANT CHANGE UP
BUN SERPL-MCNC: 32 MG/DL — HIGH (ref 7–23)
CALCIUM SERPL-MCNC: 8.1 MG/DL — LOW (ref 8.4–10.5)
CHLORIDE SERPL-SCNC: 109 MMOL/L — HIGH (ref 96–108)
CO2 SERPL-SCNC: 19 MMOL/L — LOW (ref 22–31)
COLOR SPEC: YELLOW — SIGNIFICANT CHANGE UP
CREAT SERPL-MCNC: 1.71 MG/DL — HIGH (ref 0.5–1.3)
DIFF PNL FLD: NEGATIVE — SIGNIFICANT CHANGE UP
GLUCOSE SERPL-MCNC: 111 MG/DL — HIGH (ref 70–99)
GLUCOSE UR QL: NEGATIVE — SIGNIFICANT CHANGE UP
HCT VFR BLD CALC: 40.6 % — SIGNIFICANT CHANGE UP (ref 34.5–45)
HGB BLD-MCNC: 12.2 G/DL — SIGNIFICANT CHANGE UP (ref 11.5–15.5)
KETONES UR-MCNC: NEGATIVE — SIGNIFICANT CHANGE UP
LEUKOCYTE ESTERASE UR-ACNC: ABNORMAL
MCHC RBC-ENTMCNC: 28.8 PG — SIGNIFICANT CHANGE UP (ref 27–34)
MCHC RBC-ENTMCNC: 30 GM/DL — LOW (ref 32–36)
MCV RBC AUTO: 95.8 FL — SIGNIFICANT CHANGE UP (ref 80–100)
NITRITE UR-MCNC: NEGATIVE — SIGNIFICANT CHANGE UP
NRBC # BLD: 0 /100 WBCS — SIGNIFICANT CHANGE UP (ref 0–0)
PH UR: 6.5 — SIGNIFICANT CHANGE UP (ref 5–8)
PLATELET # BLD AUTO: 187 K/UL — SIGNIFICANT CHANGE UP (ref 150–400)
POTASSIUM SERPL-MCNC: 4.5 MMOL/L — SIGNIFICANT CHANGE UP (ref 3.5–5.3)
POTASSIUM SERPL-SCNC: 4.5 MMOL/L — SIGNIFICANT CHANGE UP (ref 3.5–5.3)
PROT UR-MCNC: NEGATIVE MG/DL — SIGNIFICANT CHANGE UP
RBC # BLD: 4.24 M/UL — SIGNIFICANT CHANGE UP (ref 3.8–5.2)
RBC # FLD: 13.2 % — SIGNIFICANT CHANGE UP (ref 10.3–14.5)
SODIUM SERPL-SCNC: 139 MMOL/L — SIGNIFICANT CHANGE UP (ref 135–145)
SODIUM UR-SCNC: 26 MMOL/L — SIGNIFICANT CHANGE UP
SP GR SPEC: 1.01 — SIGNIFICANT CHANGE UP (ref 1–1.03)
UROBILINOGEN FLD QL: 0.2 E.U./DL — SIGNIFICANT CHANGE UP
WBC # BLD: 8.67 K/UL — SIGNIFICANT CHANGE UP (ref 3.8–10.5)
WBC # FLD AUTO: 8.67 K/UL — SIGNIFICANT CHANGE UP (ref 3.8–10.5)

## 2020-01-26 PROCEDURE — 99233 SBSQ HOSP IP/OBS HIGH 50: CPT | Mod: GC

## 2020-01-26 RX ORDER — DIPHENHYDRAMINE HYDROCHLORIDE AND LIDOCAINE HYDROCHLORIDE AND ALUMINUM HYDROXIDE AND MAGNESIUM HYDRO
5 KIT THREE TIMES A DAY
Refills: 0 | Status: DISCONTINUED | OUTPATIENT
Start: 2020-01-26 | End: 2020-01-27

## 2020-01-26 RX ADMIN — ISOSORBIDE MONONITRATE 30 MILLIGRAM(S): 60 TABLET, EXTENDED RELEASE ORAL at 11:45

## 2020-01-26 RX ADMIN — DIPHENHYDRAMINE HYDROCHLORIDE AND LIDOCAINE HYDROCHLORIDE AND ALUMINUM HYDROXIDE AND MAGNESIUM HYDRO 5 MILLILITER(S): KIT at 11:45

## 2020-01-26 RX ADMIN — CEFTAROLINE FOSAMIL 50 MILLIGRAM(S): 600 POWDER, FOR SOLUTION INTRAVENOUS at 06:24

## 2020-01-26 RX ADMIN — GABAPENTIN 100 MILLIGRAM(S): 400 CAPSULE ORAL at 17:28

## 2020-01-26 RX ADMIN — IVABRADINE 5 MILLIGRAM(S): 7.5 TABLET, FILM COATED ORAL at 17:28

## 2020-01-26 RX ADMIN — IVABRADINE 5 MILLIGRAM(S): 7.5 TABLET, FILM COATED ORAL at 06:25

## 2020-01-26 RX ADMIN — HEPARIN SODIUM 5000 UNIT(S): 5000 INJECTION INTRAVENOUS; SUBCUTANEOUS at 06:24

## 2020-01-26 RX ADMIN — HEPARIN SODIUM 5000 UNIT(S): 5000 INJECTION INTRAVENOUS; SUBCUTANEOUS at 17:28

## 2020-01-26 RX ADMIN — CEFTAROLINE FOSAMIL 50 MILLIGRAM(S): 600 POWDER, FOR SOLUTION INTRAVENOUS at 17:28

## 2020-01-26 RX ADMIN — Medication 100 MICROGRAM(S): at 06:26

## 2020-01-26 RX ADMIN — DIPHENHYDRAMINE HYDROCHLORIDE AND LIDOCAINE HYDROCHLORIDE AND ALUMINUM HYDROXIDE AND MAGNESIUM HYDRO 5 MILLILITER(S): KIT at 15:16

## 2020-01-26 RX ADMIN — ATORVASTATIN CALCIUM 40 MILLIGRAM(S): 80 TABLET, FILM COATED ORAL at 23:27

## 2020-01-26 RX ADMIN — CARVEDILOL PHOSPHATE 12.5 MILLIGRAM(S): 80 CAPSULE, EXTENDED RELEASE ORAL at 18:05

## 2020-01-26 RX ADMIN — Medication 75 MILLIGRAM(S): at 17:28

## 2020-01-26 RX ADMIN — Medication 75 MILLIGRAM(S): at 06:26

## 2020-01-26 RX ADMIN — GABAPENTIN 100 MILLIGRAM(S): 400 CAPSULE ORAL at 06:25

## 2020-01-26 RX ADMIN — CARVEDILOL PHOSPHATE 12.5 MILLIGRAM(S): 80 CAPSULE, EXTENDED RELEASE ORAL at 06:25

## 2020-01-26 NOTE — PROGRESS NOTE ADULT - SUBJECTIVE AND OBJECTIVE BOX
Patient is a 88y old  Female who presents with a chief complaint of sepsis (26 Jan 2020 08:19)      INTERVAL HPI/OVERNIGHT EVENTS: No acute events O/N. Patient states she isn't hungry and limited PO intake. C/O polyuria without dysuria.     Review of Systems: 12 point review of systems otherwise negative      MEDICATIONS  (STANDING):  atorvastatin 40 milliGRAM(s) Oral daily  carvedilol 12.5 milliGRAM(s) Oral every 12 hours  ceftaroline fosamil IVPB 400 milliGRAM(s) IV Intermittent every 12 hours  digoxin     Tablet 0.125 milliGRAM(s) Oral every other day  FIRST- Mouthwash  BLM 5 milliLiter(s) Swish and Spit three times a day  gabapentin 100 milliGRAM(s) Oral every 12 hours  heparin  Injectable 5000 Unit(s) SubCutaneous every 12 hours  influenza   Vaccine 0.5 milliLiter(s) IntraMuscular once  isosorbide   mononitrate ER Tablet (IMDUR) 30 milliGRAM(s) Oral daily  ivabradine 5 milliGRAM(s) Oral two times a day  levothyroxine 100 MICROGram(s) Oral daily  venlafaxine 75 milliGRAM(s) Oral every 12 hours    MEDICATIONS  (PRN):  guaiFENesin   Syrup  (Sugar-Free) 100 milliGRAM(s) Oral every 6 hours PRN Cough      Allergies    No Known Allergies    Intolerances          Vital Signs Last 24 Hrs  T(C): 37 (26 Jan 2020 11:56), Max: 37 (26 Jan 2020 05:28)  T(F): 98.6 (26 Jan 2020 11:56), Max: 98.6 (26 Jan 2020 05:28)  HR: 53 (26 Jan 2020 11:56) (53 - 59)  BP: 162/70 (26 Jan 2020 11:56) (119/62 - 168/61)  BP(mean): --  RR: 17 (26 Jan 2020 11:56) (17 - 17)  SpO2: 100% (26 Jan 2020 11:56) (95% - 100%)  CAPILLARY BLOOD GLUCOSE            Physical Exam:    General:  NAD  HEENT:  Nonicteric  CV:  RRR  Lungs:  CTA B/L  Abdomen:  Soft, non-tender  Extremities:  No edema  Skin:  Warm and dry, no rashes  :  No chou  Neuro:  Nonfocal  No Restraints    LABS:                        12.2   8.67  )-----------( 187      ( 26 Jan 2020 06:49 )             40.6     01-26    139  |  109<H>  |  32<H>  ----------------------------<  111<H>  4.5   |  19<L>  |  1.71<H>    Ca    8.1<L>      26 Jan 2020 06:49  Phos  3.4     01-25  Mg     2.1     01-25

## 2020-01-26 NOTE — PROGRESS NOTE ADULT - SUBJECTIVE AND OBJECTIVE BOX
CC: SEPSIS      INTERVAL HISTORY:  lying in bed in NAD      ROS: No chest pain, no sob, no abd pain. No n/v/d    PAST MEDICAL & SURGICAL HISTORY:  HLD (hyperlipidemia)  Hypertension  No significant past surgical history      PHYSICAL EXAM:  T(C): 37 (01-26-20 @ 05:28), Max: 37 (01-26-20 @ 05:28)  HR: 59 (01-26-20 @ 05:28)  BP: 168/61 (01-26-20 @ 05:28) (119/62 - 168/61)  RR: 17 (01-26-20 @ 05:28)  SpO2: 95% (01-26-20 @ 05:28)  Wt(kg): --  I&O's Summary    Weight   General:  NAD.  HEENT: moist mucous membranes, no pallor/cyanosis.  Neck: no JVD visible.  Cardiac: S1, S2. RRR. No murmurs   Respratory: coarse BS  Abdomen: soft. nontender. nondistended  Skin: no rashes.  Extremities: no LE edema b/l  Access:       DATA:                        12.2   8.67  )-----------( 187      ( 26 Jan 2020 06:49 )             40.6         139    |  109<H>  |  32<H>  ----------------------------<  111<H>  Ca:8.1<L> (26 Jan 2020 06:49)  4.5     |  19<L>  |  1.71<H>      eGFR if Non : 26 <L>  eGFR if : 30 <L>                        MEDICATIONS  (STANDING):  atorvastatin 40 milliGRAM(s) Oral daily  carvedilol 12.5 milliGRAM(s) Oral every 12 hours  ceftaroline fosamil IVPB 400 milliGRAM(s) IV Intermittent every 12 hours  digoxin     Tablet 0.125 milliGRAM(s) Oral every other day  FIRST- Mouthwash  BLM 5 milliLiter(s) Swish and Spit daily  gabapentin 100 milliGRAM(s) Oral every 12 hours  heparin  Injectable 5000 Unit(s) SubCutaneous every 12 hours  influenza   Vaccine 0.5 milliLiter(s) IntraMuscular once  isosorbide   mononitrate ER Tablet (IMDUR) 30 milliGRAM(s) Oral daily  ivabradine 5 milliGRAM(s) Oral two times a day  levothyroxine 100 MICROGram(s) Oral daily  venlafaxine 75 milliGRAM(s) Oral every 12 hours    MEDICATIONS  (PRN):  guaiFENesin   Syrup  (Sugar-Free) 100 milliGRAM(s) Oral every 6 hours PRN Cough

## 2020-01-26 NOTE — PROGRESS NOTE ADULT - PROBLEM SELECTOR PROBLEM 1
MRSA bacteremia
MRSA bacteremia
NICOLÁS (acute kidney injury)
Severe sepsis
MRSA bacteremia
MRSA bacteremia
Sepsis due to methicillin susceptible Staphylococcus aureus (MSSA) without acute organ dysfunction
Severe sepsis

## 2020-01-26 NOTE — PROGRESS NOTE ADULT - PROBLEM SELECTOR PROBLEM 2
MRSA bacteremia
R/O CHF exacerbation
R/O CHF exacerbation
Sepsis due to methicillin susceptible Staphylococcus aureus (MSSA) without acute organ dysfunction
MRSA bacteremia

## 2020-01-27 ENCOUNTER — TRANSCRIPTION ENCOUNTER (OUTPATIENT)
Age: 85
End: 2020-01-27

## 2020-01-27 VITALS
OXYGEN SATURATION: 95 % | SYSTOLIC BLOOD PRESSURE: 114 MMHG | TEMPERATURE: 98 F | DIASTOLIC BLOOD PRESSURE: 83 MMHG | RESPIRATION RATE: 18 BRPM | HEART RATE: 61 BPM

## 2020-01-27 LAB
ANION GAP SERPL CALC-SCNC: 9 MMOL/L — SIGNIFICANT CHANGE UP (ref 5–17)
BUN SERPL-MCNC: 34 MG/DL — HIGH (ref 7–23)
CALCIUM SERPL-MCNC: 8 MG/DL — LOW (ref 8.4–10.5)
CHLORIDE SERPL-SCNC: 103 MMOL/L — SIGNIFICANT CHANGE UP (ref 96–108)
CO2 SERPL-SCNC: 21 MMOL/L — LOW (ref 22–31)
CREAT SERPL-MCNC: 1.88 MG/DL — HIGH (ref 0.5–1.3)
GLUCOSE SERPL-MCNC: 124 MG/DL — HIGH (ref 70–99)
HCT VFR BLD CALC: 35.9 % — SIGNIFICANT CHANGE UP (ref 34.5–45)
HGB BLD-MCNC: 11.4 G/DL — LOW (ref 11.5–15.5)
MAGNESIUM SERPL-MCNC: 2.1 MG/DL — SIGNIFICANT CHANGE UP (ref 1.6–2.6)
MCHC RBC-ENTMCNC: 29.4 PG — SIGNIFICANT CHANGE UP (ref 27–34)
MCHC RBC-ENTMCNC: 31.8 GM/DL — LOW (ref 32–36)
MCV RBC AUTO: 92.5 FL — SIGNIFICANT CHANGE UP (ref 80–100)
NRBC # BLD: 0 /100 WBCS — SIGNIFICANT CHANGE UP (ref 0–0)
PLATELET # BLD AUTO: 253 K/UL — SIGNIFICANT CHANGE UP (ref 150–400)
POTASSIUM SERPL-MCNC: 4.4 MMOL/L — SIGNIFICANT CHANGE UP (ref 3.5–5.3)
POTASSIUM SERPL-SCNC: 4.4 MMOL/L — SIGNIFICANT CHANGE UP (ref 3.5–5.3)
RBC # BLD: 3.88 M/UL — SIGNIFICANT CHANGE UP (ref 3.8–5.2)
RBC # FLD: 13.2 % — SIGNIFICANT CHANGE UP (ref 10.3–14.5)
SODIUM SERPL-SCNC: 133 MMOL/L — LOW (ref 135–145)
WBC # BLD: 11.24 K/UL — HIGH (ref 3.8–10.5)
WBC # FLD AUTO: 11.24 K/UL — HIGH (ref 3.8–10.5)

## 2020-01-27 PROCEDURE — 51702 INSERT TEMP BLADDER CATH: CPT

## 2020-01-27 PROCEDURE — 84145 PROCALCITONIN (PCT): CPT

## 2020-01-27 PROCEDURE — 80048 BASIC METABOLIC PNL TOTAL CA: CPT

## 2020-01-27 PROCEDURE — 71045 X-RAY EXAM CHEST 1 VIEW: CPT

## 2020-01-27 PROCEDURE — 84484 ASSAY OF TROPONIN QUANT: CPT

## 2020-01-27 PROCEDURE — 80053 COMPREHEN METABOLIC PANEL: CPT

## 2020-01-27 PROCEDURE — 87449 NOS EACH ORGANISM AG IA: CPT

## 2020-01-27 PROCEDURE — 82803 BLOOD GASES ANY COMBINATION: CPT

## 2020-01-27 PROCEDURE — 87150 DNA/RNA AMPLIFIED PROBE: CPT

## 2020-01-27 PROCEDURE — 84132 ASSAY OF SERUM POTASSIUM: CPT

## 2020-01-27 PROCEDURE — 82550 ASSAY OF CK (CPK): CPT

## 2020-01-27 PROCEDURE — 80202 ASSAY OF VANCOMYCIN: CPT

## 2020-01-27 PROCEDURE — 80076 HEPATIC FUNCTION PANEL: CPT

## 2020-01-27 PROCEDURE — 87086 URINE CULTURE/COLONY COUNT: CPT

## 2020-01-27 PROCEDURE — 99232 SBSQ HOSP IP/OBS MODERATE 35: CPT

## 2020-01-27 PROCEDURE — 87486 CHLMYD PNEUM DNA AMP PROBE: CPT

## 2020-01-27 PROCEDURE — 92610 EVALUATE SWALLOWING FUNCTION: CPT

## 2020-01-27 PROCEDURE — 83036 HEMOGLOBIN GLYCOSYLATED A1C: CPT

## 2020-01-27 PROCEDURE — 93306 TTE W/DOPPLER COMPLETE: CPT

## 2020-01-27 PROCEDURE — 82962 GLUCOSE BLOOD TEST: CPT

## 2020-01-27 PROCEDURE — 85027 COMPLETE CBC AUTOMATED: CPT

## 2020-01-27 PROCEDURE — 87798 DETECT AGENT NOS DNA AMP: CPT

## 2020-01-27 PROCEDURE — 96365 THER/PROPH/DIAG IV INF INIT: CPT | Mod: XU

## 2020-01-27 PROCEDURE — 85610 PROTHROMBIN TIME: CPT

## 2020-01-27 PROCEDURE — 82570 ASSAY OF URINE CREATININE: CPT

## 2020-01-27 PROCEDURE — 87641 MR-STAPH DNA AMP PROBE: CPT

## 2020-01-27 PROCEDURE — 85025 COMPLETE CBC W/AUTO DIFF WBC: CPT

## 2020-01-27 PROCEDURE — 84295 ASSAY OF SERUM SODIUM: CPT

## 2020-01-27 PROCEDURE — 97530 THERAPEUTIC ACTIVITIES: CPT

## 2020-01-27 PROCEDURE — 82330 ASSAY OF CALCIUM: CPT

## 2020-01-27 PROCEDURE — 83880 ASSAY OF NATRIURETIC PEPTIDE: CPT

## 2020-01-27 PROCEDURE — 83605 ASSAY OF LACTIC ACID: CPT

## 2020-01-27 PROCEDURE — 83735 ASSAY OF MAGNESIUM: CPT

## 2020-01-27 PROCEDURE — 84300 ASSAY OF URINE SODIUM: CPT

## 2020-01-27 PROCEDURE — 99239 HOSP IP/OBS DSCHRG MGMT >30: CPT

## 2020-01-27 PROCEDURE — 94660 CPAP INITIATION&MGMT: CPT

## 2020-01-27 PROCEDURE — 36415 COLL VENOUS BLD VENIPUNCTURE: CPT

## 2020-01-27 PROCEDURE — 80162 ASSAY OF DIGOXIN TOTAL: CPT

## 2020-01-27 PROCEDURE — 84100 ASSAY OF PHOSPHORUS: CPT

## 2020-01-27 PROCEDURE — 83935 ASSAY OF URINE OSMOLALITY: CPT

## 2020-01-27 PROCEDURE — 87633 RESP VIRUS 12-25 TARGETS: CPT

## 2020-01-27 PROCEDURE — 87040 BLOOD CULTURE FOR BACTERIA: CPT

## 2020-01-27 PROCEDURE — 93312 ECHO TRANSESOPHAGEAL: CPT

## 2020-01-27 PROCEDURE — 97110 THERAPEUTIC EXERCISES: CPT

## 2020-01-27 PROCEDURE — 87581 M.PNEUMON DNA AMP PROBE: CPT

## 2020-01-27 PROCEDURE — 94640 AIRWAY INHALATION TREATMENT: CPT

## 2020-01-27 PROCEDURE — 87186 SC STD MICRODIL/AGAR DIL: CPT

## 2020-01-27 PROCEDURE — 93005 ELECTROCARDIOGRAM TRACING: CPT

## 2020-01-27 PROCEDURE — 97161 PT EVAL LOW COMPLEX 20 MIN: CPT

## 2020-01-27 PROCEDURE — 99291 CRITICAL CARE FIRST HOUR: CPT | Mod: 25

## 2020-01-27 PROCEDURE — 76770 US EXAM ABDO BACK WALL COMP: CPT

## 2020-01-27 PROCEDURE — 85730 THROMBOPLASTIN TIME PARTIAL: CPT

## 2020-01-27 PROCEDURE — 81001 URINALYSIS AUTO W/SCOPE: CPT

## 2020-01-27 PROCEDURE — 82553 CREATINE MB FRACTION: CPT

## 2020-01-27 PROCEDURE — 97116 GAIT TRAINING THERAPY: CPT

## 2020-01-27 PROCEDURE — 93010 ELECTROCARDIOGRAM REPORT: CPT

## 2020-01-27 PROCEDURE — 96375 TX/PRO/DX INJ NEW DRUG ADDON: CPT | Mod: XU

## 2020-01-27 RX ORDER — HEPARIN SODIUM 5000 [USP'U]/ML
0 INJECTION INTRAVENOUS; SUBCUTANEOUS
Qty: 0 | Refills: 0 | DISCHARGE
Start: 2020-01-27

## 2020-01-27 RX ORDER — ISOSORBIDE DINITRATE 5 MG/1
30 TABLET ORAL
Qty: 0 | Refills: 0 | DISCHARGE

## 2020-01-27 RX ORDER — FUROSEMIDE 40 MG
1 TABLET ORAL
Qty: 0 | Refills: 0 | DISCHARGE

## 2020-01-27 RX ORDER — DIGOXIN 250 MCG
1 TABLET ORAL
Qty: 0 | Refills: 0 | DISCHARGE
Start: 2020-01-27

## 2020-01-27 RX ORDER — SODIUM CHLORIDE 9 MG/ML
1000 INJECTION INTRAMUSCULAR; INTRAVENOUS; SUBCUTANEOUS ONCE
Refills: 0 | Status: COMPLETED | OUTPATIENT
Start: 2020-01-27 | End: 2020-01-27

## 2020-01-27 RX ORDER — ISOSORBIDE MONONITRATE 60 MG/1
1 TABLET, EXTENDED RELEASE ORAL
Qty: 0 | Refills: 0 | DISCHARGE
Start: 2020-01-27

## 2020-01-27 RX ORDER — DIPHENHYDRAMINE HYDROCHLORIDE AND LIDOCAINE HYDROCHLORIDE AND ALUMINUM HYDROXIDE AND MAGNESIUM HYDRO
0 KIT
Qty: 0 | Refills: 0 | DISCHARGE
Start: 2020-01-27

## 2020-01-27 RX ADMIN — ISOSORBIDE MONONITRATE 30 MILLIGRAM(S): 60 TABLET, EXTENDED RELEASE ORAL at 12:15

## 2020-01-27 RX ADMIN — SODIUM CHLORIDE 333.33 MILLILITER(S): 9 INJECTION INTRAMUSCULAR; INTRAVENOUS; SUBCUTANEOUS at 08:37

## 2020-01-27 RX ADMIN — Medication 100 MICROGRAM(S): at 06:46

## 2020-01-27 RX ADMIN — DIPHENHYDRAMINE HYDROCHLORIDE AND LIDOCAINE HYDROCHLORIDE AND ALUMINUM HYDROXIDE AND MAGNESIUM HYDRO 5 MILLILITER(S): KIT at 06:45

## 2020-01-27 RX ADMIN — DIPHENHYDRAMINE HYDROCHLORIDE AND LIDOCAINE HYDROCHLORIDE AND ALUMINUM HYDROXIDE AND MAGNESIUM HYDRO 5 MILLILITER(S): KIT at 12:15

## 2020-01-27 RX ADMIN — Medication 75 MILLIGRAM(S): at 06:43

## 2020-01-27 RX ADMIN — CEFTAROLINE FOSAMIL 50 MILLIGRAM(S): 600 POWDER, FOR SOLUTION INTRAVENOUS at 06:46

## 2020-01-27 RX ADMIN — Medication 0.12 MILLIGRAM(S): at 12:15

## 2020-01-27 RX ADMIN — HEPARIN SODIUM 5000 UNIT(S): 5000 INJECTION INTRAVENOUS; SUBCUTANEOUS at 06:46

## 2020-01-27 RX ADMIN — IVABRADINE 5 MILLIGRAM(S): 7.5 TABLET, FILM COATED ORAL at 06:43

## 2020-01-27 RX ADMIN — GABAPENTIN 100 MILLIGRAM(S): 400 CAPSULE ORAL at 06:42

## 2020-01-27 NOTE — DISCHARGE NOTE PROVIDER - NSDCCPTREATMENT_GEN_ALL_CORE_FT
PRINCIPAL PROCEDURE  Procedure: 2D echo  Findings and Treatment: EXAM:  ESOPHAGEAL ECHO (CARDIOL)                        PROCEDURE DATE:  01/21/2020    CONCLUSIONS:   1. Mild symmetric left ventricular hypertrophy.   2. Severely reduced left ventricular systolic function.   3. Normal right ventricular size and systolic function.   4. Mildly dilated left atrium.   5. No LA/RA/KATHERINE/RAA thrombus seen.   6. No evidence of an intracardiac shunt.   7. No significant valvular disease.   8. No pericardial effusion.   9. No vegetation seen on any valve.  Left Ventricle:  There is mild concentric left ventricular hypertrophy. Left ventricular systolic function is severely reduced with a calculated ejection fraction of 20-25% with severe global hypokinesis.        SECONDARY PROCEDURE  Procedure: XR chest AP lateral  Findings and Treatment: EXAM:  XR CHEST PORTABLE URGENT 1V                        PROCEDURE DATE:  01/16/2020    INTERPRETATION:  Clinical History: Worsening lung exam  Portable exam of the chest demonstrates cardiomegaly. Right basilar infiltrate. Dextroscoliosis thoracic spine.  Impression: Right basilar infiltrate

## 2020-01-27 NOTE — PROGRESS NOTE ADULT - ASSESSMENT
87 yo F PMH HTN, HLD, HFrEF (EF 25% from 02/2019), LBBB, pre-diabetes, RLS presents with shortness of breath for one day admitted for severe sepsis secondary to influenza, infiltrate on CXR, and MRSA bacteremia. Nephrology consulted for nonoliguric NICOLÁS.      # Nonoliguric NICOLÁS   - likely due to sepsis-related hypoperfusion, hypoxia, poor oral intake/ dehydration  - baseline Cr 0.9 in 9/2019, plateaued at 1.7-1.9   - maintain adequate hydration/ encourage oral intake/ assist with feeds and please address pain in the mouth   - volume status and electrolytes acceptable   - avoid ACE/ARB/NSAIDs/PPIs  - renal diet, daily weight, in/out

## 2020-01-27 NOTE — PROGRESS NOTE ADULT - NSHPATTENDINGPLANDISCUSS_GEN_ALL_CORE
unchanged
medical team
team
medical team
team
medical staff
primary team
medical staff
medical staff
HS, Family at bedside
HS
HS, patient's family
HS, patient
HS, patient
hs
HS, patient

## 2020-01-27 NOTE — PROGRESS NOTE ADULT - SUBJECTIVE AND OBJECTIVE BOX
Patient is a 88y Female seen and evaluated at bedside.       Meds:  atorvastatin 40 daily  carvedilol 12.5 every 12 hours  ceftaroline fosamil IVPB 400 every 12 hours  digoxin     Tablet 0.125 every other day  FIRST- Mouthwash  BLM 5 three times a day  gabapentin 100 every 12 hours  guaiFENesin   Syrup  (Sugar-Free) 100 every 6 hours PRN  heparin  Injectable 5000 every 12 hours  influenza   Vaccine 0.5 once  isosorbide   mononitrate ER Tablet (IMDUR) 30 daily  ivabradine 5 two times a day  levothyroxine 100 daily  venlafaxine 75 every 12 hours      T(C): , Max: 37.1 (20 @ 05:34)  T(F): , Max: 98.8 (20 @ 05:34)  HR: 61 (20 @ 12:22)  BP: 114/83 (20 @ 12:22)  BP(mean): --  RR: 18 (20 @ 12:22)  SpO2: 95% (20 @ 12:22)  Wt(kg): --      General: NAD, alert  HEENT: moist mucous membranes, no pallor/cyanosis  Neck: no JVD visible  Cardiac: S1, S2. RRR. No murmurs   Respratory: coarse BS  Abdomen: soft, nontender, nondistended, +BS  Skin: no rashes  Extremities: no LE edema b/l        LABS:                        11.4   11.24 )-----------( 253      ( 2020 05:43 )             35.9         133<L>  |  103  |  34<H>  ----------------------------<  124<H>  4.4   |  21<L>  |  1.88<H>    Ca    8.0<L>      2020 05:43  Mg     2.1               Urinalysis Basic - ( 2020 18:48 )    Color: Yellow / Appearance: Clear / S.015 / pH: x  Gluc: x / Ketone: NEGATIVE  / Bili: Negative / Urobili: 0.2 E.U./dL   Blood: x / Protein: NEGATIVE mg/dL / Nitrite: NEGATIVE   Leuk Esterase: Trace / RBC: < 5 /HPF / WBC 5-10 /HPF   Sq Epi: x / Non Sq Epi: 0-5 /HPF / Bacteria: Present /HPF      Sodium, Random Urine: 26 mmol/L ( @ 18:48)        RADIOLOGY & ADDITIONAL STUDIES:  reviewed

## 2020-01-27 NOTE — PROGRESS NOTE ADULT - REASON FOR ADMISSION
sepsis

## 2020-01-27 NOTE — DISCHARGE NOTE PROVIDER - NSDCCPCAREPLAN_GEN_ALL_CORE_FT
PRINCIPAL DISCHARGE DIAGNOSIS  Diagnosis: Sepsis  Assessment and Plan of Treatment: You were admitted to the hospital because of fevers and were found to have the flu as well as a bacterial infection. You were given treatment for both and remained in the hospital to complete your IV antibiotics for your bacterial (MRSA) infection.      SECONDARY DISCHARGE DIAGNOSES  Diagnosis: NICOLÁS (acute kidney injury)  Assessment and Plan of Treatment: NICOLÁS (acute kidney injury). Your labwork showed some damage to your kidneys that improved with time. We switched your antibiotics because of this, which you completed while inpatient. We held two of your normal medications because of the damage - enalapril 10mg BID and lasix (furosemide) 20mg PO daily. Please check with your doctor on when to restart these medications. We instructed the rehab facility through paperwork to monitor your kidney function and restart the medications as appropriate. We also strongly suggest that you continue to eat well and would like you to have observed feeds and help eating as needed. Your kidney damage is partially due to decreased food/liquid intake.    Diagnosis: Respiratory failure  Assessment and Plan of Treatment: You had breathing difficulty requiring supoprt when you were in the hospital. You required a maching called BIPAP. You improved as your infection was treated.

## 2020-01-27 NOTE — DISCHARGE NOTE PROVIDER - CARE PROVIDER_API CALL
Jamaal Shaw)  Cardiovascular Disease  [facility] [place] [address]  Phone: [phone]  Fax: [phone]  Follow Up Time:

## 2020-01-27 NOTE — DISCHARGE NOTE PROVIDER - NSDCMRMEDTOKEN_GEN_ALL_CORE_FT
atorvastatin 40 mg oral tablet: 1 tab(s) orally once a day  Coreg 25 mg oral tablet: 1 tab(s) orally 2 times a day  Corlanor 5 mg oral tablet: 1 tab(s) orally 2 times a day (with meals)  digoxin 125 mcg (0.125 mg) oral tablet: 1 tab(s) orally every other day  diphenhydramine/lidocaine/aluminum hydroxide/magnesium hydroxide/simethicone mucous membrane suspension:  mucous membrane   gabapentin 100 mg oral tablet: orally every 12 hours  guaiFENesin 100 mg/5 mL oral liquid: 5 milliliter(s) orally every 6 hours, As needed, Cough  heparin: dvt prophlyaxis  isosorbide mononitrate 30 mg oral tablet, extended release: 1 tab(s) orally once a day  levothyroxine 100 mcg (0.1 mg) oral tablet: 1 tab(s) orally once a day  venlafaxine 75 mg oral tablet: 1 tab(s) orally 2 times a day

## 2020-01-27 NOTE — DISCHARGE NOTE PROVIDER - HOSPITAL COURSE
88F PMH HTN, HLD, HFrEF (EF 25% from 02/2019), LBBB, pre-diabetes, RLS presents with shortness of breath for one day admitted for severe sepsis secondary to influenza, infiltrate on CXR, and MRSA bacteremia. Pt was treated with vancomycin, had nicolás and vancomycin switched to ceftaroline. Pt was pending [facility] but needed to stay inpatient to finish ceftaroline treatment course due to finanaical/insurance/availability of ceftaroline in [facility]. Patient home enalapril 10 BID held and lasix 20 PO daily were held in setting of NICOLÁS. Please restart these mediactions as tolerated.            Problem List/Main Diagnoses (system-based):     #Influenza - 2 doses tamiflu on 1/19        #MRSA bacteremia - cefteroline IV for 14 days.        New medications: Enalapril 10mg bid held; lasix 20mg po held inpatient.        Labs to be followed outpatient: Please follow up BMP in one week.        Exam to be followed outpatient: none

## 2020-01-27 NOTE — DISCHARGE NOTE NURSING/CASE MANAGEMENT/SOCIAL WORK - NSDCCRNAME_GEN_ALL_CORE_FT
Discharge to [facility] - [address] [place] [id number]  [facility] Toy set for 3:00 pm

## 2020-01-27 NOTE — DISCHARGE NOTE NURSING/CASE MANAGEMENT/SOCIAL WORK - PATIENT PORTAL LINK FT
You can access the FollowMyHealth Patient Portal offered by [facility] by registering at the following website: [email or web address]. By joining [employer]’s FollowMyHealth portal, you will also be able to view your health information using other applications (apps) compatible with our system.

## 2020-01-29 PROBLEM — Z00.00 ENCOUNTER FOR PREVENTIVE HEALTH EXAMINATION: Status: ACTIVE | Noted: 2020-01-29

## 2020-01-30 DIAGNOSIS — N17.0 ACUTE KIDNEY FAILURE WITH TUBULAR NECROSIS: ICD-10-CM

## 2020-01-30 DIAGNOSIS — B96.20 UNSPECIFIED ESCHERICHIA COLI [E. COLI] AS THE CAUSE OF DISEASES CLASSIFIED ELSEWHERE: ICD-10-CM

## 2020-01-30 DIAGNOSIS — A41.02 SEPSIS DUE TO METHICILLIN RESISTANT STAPHYLOCOCCUS AUREUS: ICD-10-CM

## 2020-01-30 DIAGNOSIS — N18.9 CHRONIC KIDNEY DISEASE, UNSPECIFIED: ICD-10-CM

## 2020-01-30 DIAGNOSIS — R91.8 OTHER NONSPECIFIC ABNORMAL FINDING OF LUNG FIELD: ICD-10-CM

## 2020-01-30 DIAGNOSIS — M41.84 OTHER FORMS OF SCOLIOSIS, THORACIC REGION: ICD-10-CM

## 2020-01-30 DIAGNOSIS — J10.08 INFLUENZA DUE TO OTHER IDENTIFIED INFLUENZA VIRUS WITH OTHER SPECIFIED PNEUMONIA: ICD-10-CM

## 2020-01-30 DIAGNOSIS — E78.5 HYPERLIPIDEMIA, UNSPECIFIED: ICD-10-CM

## 2020-01-30 DIAGNOSIS — F03.90 UNSPECIFIED DEMENTIA WITHOUT BEHAVIORAL DISTURBANCE: ICD-10-CM

## 2020-01-30 DIAGNOSIS — R73.03 PREDIABETES: ICD-10-CM

## 2020-01-30 DIAGNOSIS — E03.9 HYPOTHYROIDISM, UNSPECIFIED: ICD-10-CM

## 2020-01-30 DIAGNOSIS — E86.0 DEHYDRATION: ICD-10-CM

## 2020-01-30 DIAGNOSIS — E56.1 DEFICIENCY OF VITAMIN K: ICD-10-CM

## 2020-01-30 DIAGNOSIS — I13.0 HYPERTENSIVE HEART AND CHRONIC KIDNEY DISEASE WITH HEART FAILURE AND STAGE 1 THROUGH STAGE 4 CHRONIC KIDNEY DISEASE, OR UNSPECIFIED CHRONIC KIDNEY DISEASE: ICD-10-CM

## 2020-01-30 DIAGNOSIS — N39.0 URINARY TRACT INFECTION, SITE NOT SPECIFIED: ICD-10-CM

## 2020-01-30 DIAGNOSIS — R33.9 RETENTION OF URINE, UNSPECIFIED: ICD-10-CM

## 2020-01-30 DIAGNOSIS — R65.20 SEVERE SEPSIS WITHOUT SEPTIC SHOCK: ICD-10-CM

## 2020-01-30 DIAGNOSIS — D17.71 BENIGN LIPOMATOUS NEOPLASM OF KIDNEY: ICD-10-CM

## 2020-01-30 DIAGNOSIS — I24.8 OTHER FORMS OF ACUTE ISCHEMIC HEART DISEASE: ICD-10-CM

## 2020-01-30 DIAGNOSIS — E44.0 MODERATE PROTEIN-CALORIE MALNUTRITION: ICD-10-CM

## 2020-01-30 DIAGNOSIS — G25.81 RESTLESS LEGS SYNDROME: ICD-10-CM

## 2020-01-30 DIAGNOSIS — A41.9 SEPSIS, UNSPECIFIED ORGANISM: ICD-10-CM

## 2020-01-30 DIAGNOSIS — I44.7 LEFT BUNDLE-BRANCH BLOCK, UNSPECIFIED: ICD-10-CM

## 2020-01-30 DIAGNOSIS — I50.23 ACUTE ON CHRONIC SYSTOLIC (CONGESTIVE) HEART FAILURE: ICD-10-CM

## 2020-01-30 DIAGNOSIS — Z66 DO NOT RESUSCITATE: ICD-10-CM

## 2020-01-30 DIAGNOSIS — R74.0 NONSPECIFIC ELEVATION OF LEVELS OF TRANSAMINASE AND LACTIC ACID DEHYDROGENASE [LDH]: ICD-10-CM

## 2020-01-30 DIAGNOSIS — K14.0 GLOSSITIS: ICD-10-CM

## 2020-01-30 DIAGNOSIS — J15.212 PNEUMONIA DUE TO METHICILLIN RESISTANT STAPHYLOCOCCUS AUREUS: ICD-10-CM

## 2020-01-30 DIAGNOSIS — J96.01 ACUTE RESPIRATORY FAILURE WITH HYPOXIA: ICD-10-CM

## 2020-01-31 ENCOUNTER — APPOINTMENT (OUTPATIENT)
Dept: CARE COORDINATION | Facility: HOME HEALTH | Age: 85
End: 2020-01-31

## 2020-01-31 DIAGNOSIS — Z86.39 PERSONAL HISTORY OF OTHER ENDOCRINE, NUTRITIONAL AND METABOLIC DISEASE: ICD-10-CM

## 2020-01-31 DIAGNOSIS — R78.81 BACTEREMIA: ICD-10-CM

## 2020-01-31 DIAGNOSIS — Z87.09 PERSONAL HISTORY OF OTHER DISEASES OF THE RESPIRATORY SYSTEM: ICD-10-CM

## 2020-01-31 DIAGNOSIS — R74.0 NONSPECIFIC ELEVATION OF LEVELS OF TRANSAMINASE AND LACTIC ACID DEHYDROGENASE [LDH]: ICD-10-CM

## 2020-01-31 DIAGNOSIS — Z87.01 PERSONAL HISTORY OF PNEUMONIA (RECURRENT): ICD-10-CM

## 2020-01-31 DIAGNOSIS — Z86.79 PERSONAL HISTORY OF OTHER DISEASES OF THE CIRCULATORY SYSTEM: ICD-10-CM

## 2020-01-31 DIAGNOSIS — Z86.59 PERSONAL HISTORY OF OTHER MENTAL AND BEHAVIORAL DISORDERS: ICD-10-CM

## 2020-01-31 DIAGNOSIS — Z86.19 PERSONAL HISTORY OF OTHER INFECTIOUS AND PARASITIC DISEASES: ICD-10-CM

## 2020-01-31 DIAGNOSIS — N17.9 ACUTE KIDNEY FAILURE, UNSPECIFIED: ICD-10-CM

## 2020-01-31 DIAGNOSIS — R09.02 HYPOXEMIA: ICD-10-CM

## 2020-01-31 DIAGNOSIS — J18.9 PNEUMONIA, UNSPECIFIED ORGANISM: ICD-10-CM

## 2020-02-03 PROBLEM — E78.5 HYPERLIPIDEMIA, UNSPECIFIED: Chronic | Status: ACTIVE | Noted: 2020-01-13

## 2020-02-04 PROBLEM — N17.9 AKI (ACUTE KIDNEY INJURY): Status: RESOLVED | Noted: 2020-02-04 | Resolved: 2020-02-04

## 2020-02-04 PROBLEM — R74.0 TRANSAMINITIS: Status: RESOLVED | Noted: 2020-02-04 | Resolved: 2020-02-04

## 2020-02-04 PROBLEM — R09.02 HYPOXIA: Status: RESOLVED | Noted: 2020-02-04 | Resolved: 2020-02-04

## 2020-02-04 PROBLEM — Z86.39 HISTORY OF HYPERLIPIDEMIA: Status: RESOLVED | Noted: 2020-02-04 | Resolved: 2020-02-04

## 2020-02-04 PROBLEM — Z86.79 HISTORY OF HYPERTENSION: Status: RESOLVED | Noted: 2020-02-04 | Resolved: 2020-02-04

## 2020-02-04 PROBLEM — J18.9 PNA (PNEUMONIA): Status: ACTIVE | Noted: 2020-02-04

## 2020-02-04 PROBLEM — Z86.19 HISTORY OF SEPSIS: Status: RESOLVED | Noted: 2020-02-04 | Resolved: 2020-02-04

## 2020-02-04 PROBLEM — Z86.79 HISTORY OF CONGESTIVE HEART FAILURE: Status: RESOLVED | Noted: 2020-02-04 | Resolved: 2020-02-04

## 2020-02-04 PROBLEM — Z87.01 HISTORY OF PNEUMONIA: Status: RESOLVED | Noted: 2020-02-04 | Resolved: 2020-02-04

## 2020-02-04 PROBLEM — R78.81 MRSA BACTEREMIA: Status: RESOLVED | Noted: 2020-02-04 | Resolved: 2020-02-04

## 2020-02-04 PROBLEM — Z86.59 HISTORY OF DEMENTIA: Status: RESOLVED | Noted: 2020-02-04 | Resolved: 2020-02-04

## 2020-03-04 RX ORDER — GUAIFEN/DEXTROMETHORPHAN/PPA
SYRUP ORAL
Refills: 0 | Status: DISCONTINUED | COMMUNITY
End: 2020-03-04

## 2020-03-05 ENCOUNTER — APPOINTMENT (OUTPATIENT)
Dept: HOME HEALTH SERVICES | Facility: HOME HEALTH | Age: 85
End: 2020-03-05
Payer: MEDICARE

## 2020-03-05 VITALS
DIASTOLIC BLOOD PRESSURE: 80 MMHG | OXYGEN SATURATION: 97 % | TEMPERATURE: 98.9 F | HEART RATE: 78 BPM | RESPIRATION RATE: 18 BRPM | SYSTOLIC BLOOD PRESSURE: 120 MMHG

## 2020-03-05 DIAGNOSIS — Z71.89 OTHER SPECIFIED COUNSELING: ICD-10-CM

## 2020-03-05 PROCEDURE — G0506: CPT

## 2020-03-05 PROCEDURE — 99497 ADVNCD CARE PLAN 30 MIN: CPT

## 2020-03-05 PROCEDURE — 99344 HOME/RES VST NEW MOD MDM 60: CPT

## 2020-03-05 NOTE — REVIEW OF SYSTEMS
[Cough] : cough [Joint Pain] : joint pain [Joint Stiffness] : joint stiffness [Joint Swelling] : joint swelling [Muscle Pain] : muscle pain [FreeTextEntry2] : pleasant [FreeTextEntry6] : friends report a chronic issue

## 2020-03-05 NOTE — COUNSELING
[Patient/Caregiver not ready to engage in discussion] : patient/caregiver not ready to engage in discussion [DNR] : Code Status: DNR [Comfort] : Treatment Guidelines: Comfort [DNI] : Intubation: DNI [ - New patient with 2 or more chronic conditions; CCM discussed and patient-centered care plan established] : New patient with 2 or more chronic conditions; CCM discussed and patient-centered care plan established [Last Verification Date: _____] : RUSTST Completion/last verification date: [unfilled] [FreeTextEntry4] : Educated patient/legal representative about CCM services and consent.\par Educated patient/legal representative that this service is available because they have 2 or more chronic conditions, that only one Provider can furnish CCM Services per calendar month and that CCM services are subject to the usual Medicare deductible and coinsurance. \par Patient/legal representative understands the right to stop the CCM services at any time by notifying [facility] Call[facility] office.\par Patient/legal representative has verbally consented 3/5/20\par \par

## 2020-03-05 NOTE — REASON FOR VISIT
[FreeTextEntry1] : HTN, HLD, HFrEF (EF 25% from 02/2019), LBBB, pre-diabetes, RLS  [FreeTextEntry2] : nephrology [FreeTextEntry3] : LIZZIE

## 2020-03-05 NOTE — HISTORY OF PRESENT ILLNESS
[FreeTextEntry1] : HTN, HLD, HFrEF (EF 25% from 02/2019), LBBB, pre-diabetes, RLS  [FreeTextEntry2] : EAMON WISE is an 88 year with HTN, HLD, HFrEF (EF 25% from 02/2019), LBBB, pre-diabetes, RLS               seen today for initial home visit\par \par Accompanying patient today is HHA/friends\par \par Patient's last hospitalization was 1/13/19 for shortness of breath for one day admitted for severe sepsis secondary to influenza, infiltrate on CXR, and MRSA bacteremia.\par \par Since hospitalization patient/ patient's caregiver reports decline refusing to ambulate.  Poor appetite\par \par Patient/ patient's caregiver reports no weight loss >10lbs in the past 6 months. No changes in dentition or swallowing reported, No changes in hearing or vision reported. No changes in Cognition reported. Patient denies any symptoms of depression or anxiety. Patient is ADL and IADL dependent. No changes in gait or falls reported. \par Patient's home environment is safe. \par Goals of care discussed. MOLST completed. \par \par \par

## 2020-03-19 ENCOUNTER — APPOINTMENT (OUTPATIENT)
Dept: INFECTIOUS DISEASE | Facility: CLINIC | Age: 85
End: 2020-03-19

## 2020-03-19 ENCOUNTER — RX RENEWAL (OUTPATIENT)
Age: 85
End: 2020-03-19

## 2020-03-19 RX ORDER — GABAPENTIN 100 MG/1
100 CAPSULE ORAL
Qty: 180 | Refills: 3 | Status: ACTIVE | COMMUNITY
Start: 1900-01-01 | End: 1900-01-01

## 2020-04-02 ENCOUNTER — APPOINTMENT (OUTPATIENT)
Dept: HOME HEALTH SERVICES | Facility: HOME HEALTH | Age: 85
End: 2020-04-02

## 2020-04-07 ENCOUNTER — APPOINTMENT (OUTPATIENT)
Dept: INTERNAL MEDICINE | Facility: HOME HEALTH | Age: 85
End: 2020-04-07

## 2020-05-07 ENCOUNTER — APPOINTMENT (OUTPATIENT)
Dept: HOME HEALTH SERVICES | Facility: HOME HEALTH | Age: 85
End: 2020-05-07
Payer: MEDICARE

## 2020-05-07 DIAGNOSIS — D63.8 ANEMIA IN OTHER CHRONIC DISEASES CLASSIFIED ELSEWHERE: ICD-10-CM

## 2020-05-07 PROCEDURE — 99349 HOME/RES VST EST MOD MDM 40: CPT | Mod: 95

## 2020-05-07 NOTE — REASON FOR VISIT
[Initial Evaluation] : an initial evaluation [Family Member] : family member [Formal Caregiver] : formal caregiver [Pre-Visit Preparation] : pre-visit preparation was done [Intercurrent Specialty/Sub-specialty Visits] : the patient has intercurrent specialty/sub-specialty visits [FreeTextEntry3] : LIZZIE [FreeTextEntry1] : HTN, HLD, HFrEF (EF 25% from 02/2019), LBBB, pre-diabetes, RLS  [FreeTextEntry2] : nephrology

## 2020-05-07 NOTE — PHYSICAL EXAM
[No Acute Distress] : no acute distress [Well Nourished] : well nourished [Well Developed] : well developed [Normal Sclera/Conjunctiva] : normal sclera/conjunctiva [Normal Outer Ear/Nose] : the ears and nose were normal in appearance [EOMI] : extra ocular movement intact [Normal Oropharynx] : the oropharynx was normal [Clear to Auscultation] : lungs were clear to auscultation bilaterally [No Accessory Muscle Use] : no accessory muscle use [No Murmurs] : no murmurs heard [No Edema] : there was no peripheral edema [Non Tender] : non-tender [Soft] : abdomen soft [Not Distended] : not distended [Normal Post Cervical Nodes] : no posterior cervical lymphadenopathy [No CVA Tenderness] : no ~M costovertebral angle tenderness [Normal Anterior Cervical Nodes] : no anterior cervical lymphadenopathy [No Spinal Tenderness] : no spinal tenderness [Normal Strength/Tone] : muscle strength and tone were normal [No Rash] : no rash [Oriented x3] : oriented to person, place, and time [Normal Affect] : the affect was normal [de-identified] : Bulgarian speaking.  Verbalizing needs.   [de-identified] : Telehealth visit unable to perform auscultation.   [de-identified] : Refusing to ambulate.  Denies pain.  Just does not want to walk.  [de-identified] : Telehealth visit unable to perform auscultation.

## 2020-05-07 NOTE — HISTORY OF PRESENT ILLNESS
[Family Member] : family member [Formal Caregiver] : formal caregiver [FreeTextEntry2] : EAMON WISE is being seen for a visit provided via telehealth via [Face Time/Judy].  This visit was first attempted using coin4ce telehealth real-time audio visual technology, but was unable to be completed.\par EAMON WISE was located at their home, [address]\[place] [address], at the time of the visit.\par The house calls clinician, MAYUR AIKEN, was located remotely at their home in [place] at the time of the visit.\par The patient,  EAMON WISE, and the house calls clinician,  MAYUR AIKEN, participated in the telehealth encounter.\par Other participants included: []\par (Sep 13 1931) or his/her representative consents to the use of telehealth.  All questions related to telehealth answered.\par \par EAMON WISE is an 88 year with HTN, HLD, HFrEF (EF 25% from 02/2019), LBBB, pre-diabetes, RLS        \par Doximity visit with HCP Pebbles.  Reports patient is refusing to ambulate.  Denied pain.  No skin breakdown.  No fever or chills.  Requires HHA service.  \par \par Patient/ patient's caregiver reports no weight loss >10lbs in the past 6 months. No changes in dentition or swallowing reported, No changes in hearing or vision reported. No changes in Cognition reported. Patient denies any symptoms of depression or anxiety. Patient is ADL and IADL dependent. No changes in gait or falls reported. \par Patient's home environment is safe. \par Goals of care discussed. MOLST completed. \par \par \par  [FreeTextEntry1] : HTN, HLD, HFrEF (EF 25% from 02/2019), LBBB, pre-diabetes, RLS

## 2020-05-07 NOTE — REVIEW OF SYSTEMS
[Cough] : cough [Joint Pain] : joint pain [Joint Stiffness] : joint stiffness [Joint Swelling] : joint swelling [Muscle Pain] : muscle pain [Negative] : Psychiatric [FreeTextEntry2] : pleasant [FreeTextEntry6] : friends report a chronic issue [FreeTextEntry9] : Refusing to walk.  Want to stay in bed.

## 2020-05-09 ENCOUNTER — LABORATORY RESULT (OUTPATIENT)
Age: 85
End: 2020-05-09

## 2020-05-11 LAB
ALBUMIN SERPL ELPH-MCNC: 3.4 G/DL
ALP BLD-CCNC: 117 U/L
ALT SERPL-CCNC: 19 U/L
ANION GAP SERPL CALC-SCNC: 14 MMOL/L
AST SERPL-CCNC: 41 U/L
BASOPHILS # BLD AUTO: 0.04 K/UL
BASOPHILS NFR BLD AUTO: 0.7 %
BILIRUB SERPL-MCNC: 0.3 MG/DL
BUN SERPL-MCNC: 23 MG/DL
CALCIUM SERPL-MCNC: 8.7 MG/DL
CHLORIDE SERPL-SCNC: 103 MMOL/L
CHOLEST SERPL-MCNC: 122 MG/DL
CHOLEST/HDLC SERPL: 3 RATIO
CO2 SERPL-SCNC: 27 MMOL/L
CREAT SERPL-MCNC: 1.16 MG/DL
EOSINOPHIL # BLD AUTO: 0.3 K/UL
EOSINOPHIL NFR BLD AUTO: 5.5 %
GLUCOSE SERPL-MCNC: 84 MG/DL
HCT VFR BLD CALC: 33.7 %
HDLC SERPL-MCNC: 42 MG/DL
HGB BLD-MCNC: 10.2 G/DL
IMM GRANULOCYTES NFR BLD AUTO: 0.4 %
LDLC SERPL CALC-MCNC: 62 MG/DL
LYMPHOCYTES # BLD AUTO: 1.56 K/UL
LYMPHOCYTES NFR BLD AUTO: 28.5 %
MAN DIFF?: NORMAL
MCHC RBC-ENTMCNC: 29.5 PG
MCHC RBC-ENTMCNC: 30.3 GM/DL
MCV RBC AUTO: 97.4 FL
MONOCYTES # BLD AUTO: 0.59 K/UL
MONOCYTES NFR BLD AUTO: 10.8 %
NEUTROPHILS # BLD AUTO: 2.96 K/UL
NEUTROPHILS NFR BLD AUTO: 54.1 %
PLATELET # BLD AUTO: 266 K/UL
POTASSIUM SERPL-SCNC: 3.5 MMOL/L
PROT SERPL-MCNC: 7 G/DL
RBC # BLD: 3.46 M/UL
RBC # FLD: 12.6 %
SODIUM SERPL-SCNC: 143 MMOL/L
TRIGL SERPL-MCNC: 92 MG/DL
TSH SERPL-ACNC: 0.96 UIU/ML
WBC # FLD AUTO: 5.47 K/UL

## 2020-05-28 ENCOUNTER — APPOINTMENT (OUTPATIENT)
Dept: HOME HEALTH SERVICES | Facility: HOME HEALTH | Age: 85
End: 2020-05-28

## 2020-06-17 ENCOUNTER — APPOINTMENT (OUTPATIENT)
Dept: HOME HEALTH SERVICES | Facility: HOME HEALTH | Age: 85
End: 2020-06-17

## 2020-10-06 PROBLEM — R78.89 ELEVATED DIGOXIN LEVEL: Status: ACTIVE | Noted: 2020-05-07

## 2020-10-06 PROBLEM — Z23 INFLUENZA VACCINE ADMINISTERED: Status: ACTIVE | Noted: 2020-01-01

## 2020-10-06 NOTE — REASON FOR VISIT
[Initial Evaluation] : an initial evaluation [Family Member] : family member [Formal Caregiver] : formal caregiver [Pre-Visit Preparation] : pre-visit preparation was done [Intercurrent Specialty/Sub-specialty Visits] : the patient has intercurrent specialty/sub-specialty visits [FreeTextEntry1] : HTN, HLD, HFrEF (EF 25% from 02/2019), LBBB, pre-diabetes, RLS  [FreeTextEntry2] : nephrology [FreeTextEntry3] : LIZZIE

## 2020-10-06 NOTE — PHYSICAL EXAM
[No Acute Distress] : no acute distress [Well Nourished] : well nourished [Well Developed] : well developed [Normal Sclera/Conjunctiva] : normal sclera/conjunctiva [EOMI] : extra ocular movement intact [Normal Outer Ear/Nose] : the ears and nose were normal in appearance [Normal Oropharynx] : the oropharynx was normal [Clear to Auscultation] : lungs were clear to auscultation bilaterally [No Accessory Muscle Use] : no accessory muscle use [No Murmurs] : no murmurs heard [No Edema] : there was no peripheral edema [Non Tender] : non-tender [Soft] : abdomen soft [Not Distended] : not distended [Normal Post Cervical Nodes] : no posterior cervical lymphadenopathy [Normal Anterior Cervical Nodes] : no anterior cervical lymphadenopathy [No CVA Tenderness] : no ~M costovertebral angle tenderness [No Spinal Tenderness] : no spinal tenderness [Normal Strength/Tone] : muscle strength and tone were normal [No Rash] : no rash [Oriented x3] : oriented to person, place, and time [Normal Affect] : the affect was normal [No JVD] : no jugular venous distention [No Respiratory Distress] : no respiratory distress [Normal Rate] : heart rate was normal  [Regular Rhythm] : with a regular rhythm [Normal S1, S2] : normal S1 and S2 [Normal Bowel Sounds] : normal bowel sounds [de-identified] : Wolof speaking.  Verbalizing needs.   [de-identified] : Refusing to ambulate.  Denies pain.  Just does not want to walk.

## 2020-10-06 NOTE — HEALTH RISK ASSESSMENT
[HRA Reviewed] : Health risk assessment reviewed [Full assistance needed] : managing finances [No falls in past year] : Patient reported no falls in the past year [Yes] : The patient does have visual impairment

## 2020-10-06 NOTE — COUNSELING
[Normal Weight - ( BMI  <25 )] : normal weight - ( BMI  <25 ) [Hypertension self management education material provided] : hypertension self management education material provided [Non - Smoker] : non-smoker [Use assistive device to avoid falls] : use assistive device to avoid falls [] : foot exam [Patient not on disease-modifying anti-rheumatic drug due to overall prognosis] : Patient not on disease-modifying anti-rheumatic drug due to overall prognosis [Decrease stress] : decrease stress [Decrease hospital use] : decrease hospital use [Discussed disease trajectory with patient/caregiver] : discussed disease trajectory with patient/caregiver [Patient/Caregiver not ready to engage in discussion] : patient/caregiver not ready to engage in discussion [Full Code] : Code Status: Full Code [No Limitations] : Treatment Guidelines: No limitations [Long Term Intubation] : Intubation: Long term intubation [[facility]: _____] : [facility] Completion/last verification date: [unfilled] [FreeTextEntry4] : Educated patient/legal representative about CCM services and consent.\par Educated patient/legal representative that this service is available because they have 2 or more chronic conditions, that only one Provider can furnish CCM Services per calendar month and that CCM services are subject to the usual Medicare deductible and coinsurance. \par Patient/legal representative understands the right to stop the CCM services at any time by notifying House Calls office.\par Patient/legal representative has verbally consented 10/2020\par \par

## 2020-10-06 NOTE — HISTORY OF PRESENT ILLNESS
[Family Member] : family member [Formal Caregiver] : formal caregiver [FreeTextEntry1] : HTN, HLD, HFrEF (EF 25% from 02/2019), LBBB, pre-diabetes, RLS  [FreeTextEntry2] : EAMON WISE is an 89 year with HTN, HLD, HFrEF (EF 25% from 02/2019), LBBB, pre-diabetes, RLS        \par Follow up on chronic medical issuest with HCP Pebbles.  Reports patient is refusing to ambulate.  Denied pain.  No skin breakdown.  No fever or chills.  Requires HHA service.  \par \par Patient/ patient's caregiver reports no weight loss >10lbs in the past 6 months. No changes in dentition or swallowing reported, No changes in hearing or vision reported. No changes in Cognition reported. Patient denies any symptoms of depression or anxiety. Patient is ADL and IADL dependent. No changes in gait or falls reported. \par Patient's home environment is safe. \par Goals of care discussed. MOLST completed. \par \par \par

## 2020-10-06 NOTE — REVIEW OF SYSTEMS
[Joint Pain] : joint pain [Joint Stiffness] : joint stiffness [Joint Swelling] : joint swelling [Muscle Pain] : muscle pain [Negative] : Heme/Lymph [Hearing Loss] : hearing loss [Cough] : no cough [FreeTextEntry2] : Dementia [FreeTextEntry5] : Hypertension, LBBB [FreeTextEntry9] : Refusing to walk.  Want to stay in bed.

## 2020-12-08 NOTE — REVIEW OF SYSTEMS
[Hearing Loss] : hearing loss [Joint Pain] : joint pain [Joint Stiffness] : joint stiffness [Joint Swelling] : joint swelling [Muscle Pain] : muscle pain [Negative] : Heme/Lymph [Cough] : no cough [FreeTextEntry2] : Dementia [FreeTextEntry5] : Hypertension, LBBB [FreeTextEntry9] : Refusing to walk.  Want to stay in bed.

## 2020-12-08 NOTE — HISTORY OF PRESENT ILLNESS
[Family Member] : family member [Formal Caregiver] : formal caregiver [FreeTextEntry1] : HTN, HLD, HFrEF (EF 25% from 02/2019), LBBB, pre-diabetes, RLS  [FreeTextEntry2] : EAMON WISE is an 89 year with HTN, HLD, HFrEF (EF 25% from 02/2019), LBBB, pre-diabetes, RLS        \par Follow up on chronic medical issuest with HCP Pebbles.  Reports patient is refusing to ambulate.  Denied pain.  No skin breakdown.  No fever or chills.  Requires HHA service.  Severe arthritis in the knee limited mobility.  No other issues. \par \par Patient denies fever, cough, trouble breathing, rash, vomiting and diarrhea. Patient has not been in close contact with someone covid positive.\par \par N95 mask, gloves, eye wear and gown used during visit: [Y]. Total face to face time with patient is 40 min.\par \par Working with family to establish immunization history\par \par \par Patient/ patient's caregiver reports no weight loss >10lbs in the past 6 months. No changes in dentition or swallowing reported, No changes in hearing or vision reported. No changes in Cognition reported. Patient denies any symptoms of depression or anxiety. Patient is ADL and IADL dependent. No changes in gait or falls reported. \par Patient's home environment is safe. \par Goals of care discussed. MOLST completed. \par \par \par

## 2020-12-08 NOTE — COUNSELING
[Normal Weight - ( BMI  <25 )] : normal weight - ( BMI  <25 ) [Hypertension self management education material provided] : hypertension self management education material provided [Non - Smoker] : non-smoker [Use assistive device to avoid falls] : use assistive device to avoid falls [] : foot exam [Patient not on disease-modifying anti-rheumatic drug due to overall prognosis] : Patient not on disease-modifying anti-rheumatic drug due to overall prognosis [Decrease stress] : decrease stress [Decrease hospital use] : decrease hospital use [Discussed disease trajectory with patient/caregiver] : discussed disease trajectory with patient/caregiver [Patient/Caregiver not ready to engage in discussion] : patient/caregiver not ready to engage in discussion [Full Code] : Code Status: Full Code [No Limitations] : Treatment Guidelines: No limitations [Long Term Intubation] : Intubation: Long term intubation [Last Verification Date: _____] : [facility] Completion/last verification date: [unfilled] [FreeTextEntry4] : Educated patient/legal representative about CCM services and consent.\par Educated patient/legal representative that this service is available because they have 2 or more chronic conditions, that only one Provider can furnish CCM Services per calendar month and that CCM services are subject to the usual Medicare deductible and coinsurance. \par Patient/legal representative understands the right to stop the CCM services at any time by notifying House Calls office.\par Patient/legal representative has verbally consented 10/2020\par \par

## 2020-12-08 NOTE — PHYSICAL EXAM
[No Acute Distress] : no acute distress [Well Nourished] : well nourished [Well Developed] : well developed [Normal Sclera/Conjunctiva] : normal sclera/conjunctiva [EOMI] : extra ocular movement intact [Normal Outer Ear/Nose] : the ears and nose were normal in appearance [Normal Oropharynx] : the oropharynx was normal [No JVD] : no jugular venous distention [No Respiratory Distress] : no respiratory distress [Clear to Auscultation] : lungs were clear to auscultation bilaterally [No Accessory Muscle Use] : no accessory muscle use [Normal Rate] : heart rate was normal  [Regular Rhythm] : with a regular rhythm [Normal S1, S2] : normal S1 and S2 [No Murmurs] : no murmurs heard [No Edema] : there was no peripheral edema [Normal Bowel Sounds] : normal bowel sounds [Non Tender] : non-tender [Soft] : abdomen soft [Not Distended] : not distended [Normal Post Cervical Nodes] : no posterior cervical lymphadenopathy [Normal Anterior Cervical Nodes] : no anterior cervical lymphadenopathy [No CVA Tenderness] : no ~M costovertebral angle tenderness [No Spinal Tenderness] : no spinal tenderness [Normal Strength/Tone] : muscle strength and tone were normal [No Rash] : no rash [Oriented x3] : oriented to person, place, and time [Normal Affect] : the affect was normal [de-identified] : Khmer speaking.  Verbalizing needs.   [de-identified] : Refusing to ambulate.  Denies pain.  Just does not want to walk.

## 2020-12-23 PROBLEM — Z87.09 HISTORY OF INFLUENZA: Status: RESOLVED | Noted: 2020-02-04 | Resolved: 2020-01-01

## 2021-01-01 ENCOUNTER — NON-APPOINTMENT (OUTPATIENT)
Age: 86
End: 2021-01-01

## 2021-01-01 ENCOUNTER — APPOINTMENT (OUTPATIENT)
Dept: HEART AND VASCULAR | Facility: CLINIC | Age: 86
End: 2021-01-01

## 2021-01-01 ENCOUNTER — APPOINTMENT (OUTPATIENT)
Dept: HOME HEALTH SERVICES | Facility: HOME HEALTH | Age: 86
End: 2021-01-01

## 2021-01-01 ENCOUNTER — RX RENEWAL (OUTPATIENT)
Age: 86
End: 2021-01-01

## 2021-01-01 ENCOUNTER — TRANSCRIPTION ENCOUNTER (OUTPATIENT)
Age: 86
End: 2021-01-01

## 2021-01-01 ENCOUNTER — APPOINTMENT (OUTPATIENT)
Dept: HOME HEALTH SERVICES | Facility: HOME HEALTH | Age: 86
End: 2021-01-01
Payer: MEDICARE

## 2021-01-01 ENCOUNTER — APPOINTMENT (OUTPATIENT)
Dept: HEART AND VASCULAR | Facility: CLINIC | Age: 86
End: 2021-01-01
Payer: MEDICARE

## 2021-01-01 ENCOUNTER — INPATIENT (INPATIENT)
Facility: HOSPITAL | Age: 86
LOS: 3 days | Discharge: HOME HEALTH | DRG: 291 | End: 2021-05-08
Attending: INTERNAL MEDICINE | Admitting: INTERNAL MEDICINE
Payer: MEDICARE

## 2021-01-01 VITALS
RESPIRATION RATE: 18 BRPM | OXYGEN SATURATION: 95 % | SYSTOLIC BLOOD PRESSURE: 128 MMHG | DIASTOLIC BLOOD PRESSURE: 70 MMHG | HEIGHT: 60 IN | HEART RATE: 70 BPM | TEMPERATURE: 97 F

## 2021-01-01 VITALS
TEMPERATURE: 98 F | OXYGEN SATURATION: 94 % | SYSTOLIC BLOOD PRESSURE: 144 MMHG | HEART RATE: 94 BPM | RESPIRATION RATE: 18 BRPM | HEIGHT: 62 IN | DIASTOLIC BLOOD PRESSURE: 80 MMHG

## 2021-01-01 VITALS
RESPIRATION RATE: 18 BRPM | TEMPERATURE: 98 F | OXYGEN SATURATION: 97 % | SYSTOLIC BLOOD PRESSURE: 130 MMHG | HEART RATE: 70 BPM | DIASTOLIC BLOOD PRESSURE: 80 MMHG

## 2021-01-01 VITALS
OXYGEN SATURATION: 97 % | BODY MASS INDEX: 28.86 KG/M2 | TEMPERATURE: 97.8 F | HEART RATE: 90 BPM | WEIGHT: 146.98 LBS | SYSTOLIC BLOOD PRESSURE: 149 MMHG | DIASTOLIC BLOOD PRESSURE: 72 MMHG | HEIGHT: 60 IN

## 2021-01-01 VITALS — TEMPERATURE: 98 F

## 2021-01-01 DIAGNOSIS — D64.9 ANEMIA, UNSPECIFIED: ICD-10-CM

## 2021-01-01 DIAGNOSIS — I48.0 PAROXYSMAL ATRIAL FIBRILLATION: ICD-10-CM

## 2021-01-01 DIAGNOSIS — J45.909 UNSPECIFIED ASTHMA, UNCOMPLICATED: ICD-10-CM

## 2021-01-01 DIAGNOSIS — I38 ENDOCARDITIS, VALVE UNSPECIFIED: ICD-10-CM

## 2021-01-01 DIAGNOSIS — E03.9 HYPOTHYROIDISM, UNSPECIFIED: ICD-10-CM

## 2021-01-01 DIAGNOSIS — K59.09 OTHER CONSTIPATION: ICD-10-CM

## 2021-01-01 DIAGNOSIS — I50.23 ACUTE ON CHRONIC SYSTOLIC (CONGESTIVE) HEART FAILURE: ICD-10-CM

## 2021-01-01 DIAGNOSIS — I13.0 HYPERTENSIVE HEART AND CHRONIC KIDNEY DISEASE WITH HEART FAILURE AND STAGE 1 THROUGH STAGE 4 CHRONIC KIDNEY DISEASE, OR UNSPECIFIED CHRONIC KIDNEY DISEASE: ICD-10-CM

## 2021-01-01 DIAGNOSIS — M17.11 UNILATERAL PRIMARY OSTEOARTHRITIS, RIGHT KNEE: ICD-10-CM

## 2021-01-01 DIAGNOSIS — N18.30 CHRONIC KIDNEY DISEASE, STAGE 3 UNSPECIFIED: ICD-10-CM

## 2021-01-01 DIAGNOSIS — F32.9 MAJOR DEPRESSIVE DISORDER, SINGLE EPISODE, UNSPECIFIED: ICD-10-CM

## 2021-01-01 DIAGNOSIS — N18.9 CHRONIC KIDNEY DISEASE, UNSPECIFIED: ICD-10-CM

## 2021-01-01 DIAGNOSIS — I24.8 OTHER FORMS OF ACUTE ISCHEMIC HEART DISEASE: ICD-10-CM

## 2021-01-01 DIAGNOSIS — I50.22 CHRONIC SYSTOLIC (CONGESTIVE) HEART FAILURE: ICD-10-CM

## 2021-01-01 DIAGNOSIS — R47.1 DYSARTHRIA AND ANARTHRIA: ICD-10-CM

## 2021-01-01 DIAGNOSIS — R65.20 SEPSIS, UNSPECIFIED ORGANISM: ICD-10-CM

## 2021-01-01 DIAGNOSIS — Z82.49 FAMILY HISTORY OF ISCHEMIC HEART DISEASE AND OTHER DISEASES OF THE CIRCULATORY SYSTEM: ICD-10-CM

## 2021-01-01 DIAGNOSIS — R55 SYNCOPE AND COLLAPSE: ICD-10-CM

## 2021-01-01 DIAGNOSIS — Z74.01 BED CONFINEMENT STATUS: ICD-10-CM

## 2021-01-01 DIAGNOSIS — J15.20: ICD-10-CM

## 2021-01-01 DIAGNOSIS — N39.0 URINARY TRACT INFECTION, SITE NOT SPECIFIED: ICD-10-CM

## 2021-01-01 DIAGNOSIS — I44.7 LEFT BUNDLE-BRANCH BLOCK, UNSPECIFIED: ICD-10-CM

## 2021-01-01 DIAGNOSIS — E78.5 HYPERLIPIDEMIA, UNSPECIFIED: ICD-10-CM

## 2021-01-01 DIAGNOSIS — I27.20 PULMONARY HYPERTENSION, UNSPECIFIED: ICD-10-CM

## 2021-01-01 DIAGNOSIS — Z86.79 PERSONAL HISTORY OF OTHER DISEASES OF THE CIRCULATORY SYSTEM: ICD-10-CM

## 2021-01-01 DIAGNOSIS — M17.5 OTHER UNILATERAL SECONDARY OSTEOARTHRITIS OF KNEE: ICD-10-CM

## 2021-01-01 DIAGNOSIS — E46 UNSPECIFIED PROTEIN-CALORIE MALNUTRITION: ICD-10-CM

## 2021-01-01 DIAGNOSIS — Z23 ENCOUNTER FOR IMMUNIZATION: ICD-10-CM

## 2021-01-01 DIAGNOSIS — R73.03 PREDIABETES: ICD-10-CM

## 2021-01-01 DIAGNOSIS — I25.10 ATHEROSCLEROTIC HEART DISEASE OF NATIVE CORONARY ARTERY WITHOUT ANGINA PECTORIS: ICD-10-CM

## 2021-01-01 DIAGNOSIS — F03.90 UNSPECIFIED DEMENTIA WITHOUT BEHAVIORAL DISTURBANCE: ICD-10-CM

## 2021-01-01 DIAGNOSIS — R94.6 ABNORMAL RESULTS OF THYROID FUNCTION STUDIES: ICD-10-CM

## 2021-01-01 DIAGNOSIS — F03.90 UNSPECIFIED DEMENTIA, UNSPECIFIED SEVERITY, WITHOUT BEHAVIORAL DISTURBANCE, PSYCHOTIC DISTURBANCE, MOOD DISTURBANCE, AND ANXIETY: ICD-10-CM

## 2021-01-01 DIAGNOSIS — A41.9 SEPSIS, UNSPECIFIED ORGANISM: ICD-10-CM

## 2021-01-01 DIAGNOSIS — R19.06 EPIGASTRIC SWELLING, MASS OR LUMP: ICD-10-CM

## 2021-01-01 DIAGNOSIS — H90.A31 MIXED CONDUCTIVE AND SENSORINEURAL HEARING LOSS, UNILATERAL, RIGHT EAR WITH RESTRICTED HEARING ON THE  CONTRALATERAL SIDE: ICD-10-CM

## 2021-01-01 DIAGNOSIS — I08.2 RHEUMATIC DISORDERS OF BOTH AORTIC AND TRICUSPID VALVES: ICD-10-CM

## 2021-01-01 DIAGNOSIS — Z66 DO NOT RESUSCITATE: ICD-10-CM

## 2021-01-01 DIAGNOSIS — Z87.891 PERSONAL HISTORY OF NICOTINE DEPENDENCE: ICD-10-CM

## 2021-01-01 LAB
A1C WITH ESTIMATED AVERAGE GLUCOSE RESULT: 5.9 % — HIGH (ref 4–5.6)
ALBUMIN SERPL ELPH-MCNC: 3.3 G/DL — SIGNIFICANT CHANGE UP (ref 3.3–5)
ALBUMIN SERPL ELPH-MCNC: 3.7 G/DL
ALP BLD-CCNC: 97 U/L
ALP SERPL-CCNC: 131 U/L — HIGH (ref 40–120)
ALT FLD-CCNC: 19 U/L — SIGNIFICANT CHANGE UP (ref 10–45)
ALT SERPL-CCNC: 17 U/L
ANION GAP SERPL CALC-SCNC: 11 MMOL/L — SIGNIFICANT CHANGE UP (ref 5–17)
ANION GAP SERPL CALC-SCNC: 11 MMOL/L — SIGNIFICANT CHANGE UP (ref 5–17)
ANION GAP SERPL CALC-SCNC: 14 MMOL/L
ANION GAP SERPL CALC-SCNC: 8 MMOL/L — SIGNIFICANT CHANGE UP (ref 5–17)
ANION GAP SERPL CALC-SCNC: 9 MMOL/L
ANION GAP SERPL CALC-SCNC: 9 MMOL/L — SIGNIFICANT CHANGE UP (ref 5–17)
APPEARANCE UR: CLEAR — SIGNIFICANT CHANGE UP
APTT BLD: 35.7 SEC — HIGH (ref 27.5–35.5)
APTT BLD: 37.8 SEC — HIGH (ref 27.5–35.5)
AST SERPL-CCNC: 34 U/L
AST SERPL-CCNC: 47 U/L — HIGH (ref 10–40)
BACTERIA # UR AUTO: PRESENT /HPF
BASOPHILS # BLD AUTO: 0.02 K/UL — SIGNIFICANT CHANGE UP (ref 0–0.2)
BASOPHILS # BLD AUTO: 0.02 K/UL — SIGNIFICANT CHANGE UP (ref 0–0.2)
BASOPHILS # BLD AUTO: 0.03 K/UL
BASOPHILS NFR BLD AUTO: 0.5 % — SIGNIFICANT CHANGE UP (ref 0–2)
BASOPHILS NFR BLD AUTO: 0.6 % — SIGNIFICANT CHANGE UP (ref 0–2)
BASOPHILS NFR BLD AUTO: 0.7 %
BILIRUB SERPL-MCNC: 0.5 MG/DL — SIGNIFICANT CHANGE UP (ref 0.2–1.2)
BILIRUB SERPL-MCNC: 0.6 MG/DL
BILIRUB UR-MCNC: NEGATIVE — SIGNIFICANT CHANGE UP
BLD GP AB SCN SERPL QL: NEGATIVE — SIGNIFICANT CHANGE UP
BLD GP AB SCN SERPL QL: NEGATIVE — SIGNIFICANT CHANGE UP
BUN SERPL-MCNC: 25 MG/DL
BUN SERPL-MCNC: 27 MG/DL
BUN SERPL-MCNC: 27 MG/DL — HIGH (ref 7–23)
BUN SERPL-MCNC: 29 MG/DL — HIGH (ref 7–23)
BUN SERPL-MCNC: 30 MG/DL — HIGH (ref 7–23)
BUN SERPL-MCNC: 31 MG/DL — HIGH (ref 7–23)
CALCIUM SERPL-MCNC: 8.2 MG/DL
CALCIUM SERPL-MCNC: 8.5 MG/DL — SIGNIFICANT CHANGE UP (ref 8.4–10.5)
CALCIUM SERPL-MCNC: 8.7 MG/DL
CALCIUM SERPL-MCNC: 8.8 MG/DL — SIGNIFICANT CHANGE UP (ref 8.4–10.5)
CHLORIDE SERPL-SCNC: 100 MMOL/L — SIGNIFICANT CHANGE UP (ref 96–108)
CHLORIDE SERPL-SCNC: 100 MMOL/L — SIGNIFICANT CHANGE UP (ref 96–108)
CHLORIDE SERPL-SCNC: 103 MMOL/L
CHLORIDE SERPL-SCNC: 103 MMOL/L — SIGNIFICANT CHANGE UP (ref 96–108)
CHLORIDE SERPL-SCNC: 104 MMOL/L
CHLORIDE SERPL-SCNC: 105 MMOL/L — SIGNIFICANT CHANGE UP (ref 96–108)
CHOLEST SERPL-MCNC: 103 MG/DL
CHOLEST SERPL-MCNC: 109 MG/DL
CHOLEST SERPL-MCNC: 97 MG/DL — SIGNIFICANT CHANGE UP
CK MB CFR SERPL CALC: 4.4 NG/ML — SIGNIFICANT CHANGE UP (ref 0–6.7)
CK MB CFR SERPL CALC: 5.9 NG/ML — SIGNIFICANT CHANGE UP (ref 0–6.7)
CK SERPL-CCNC: 138 U/L — SIGNIFICANT CHANGE UP (ref 25–170)
CK SERPL-CCNC: 176 U/L — HIGH (ref 25–170)
CO2 SERPL-SCNC: 22 MMOL/L
CO2 SERPL-SCNC: 26 MMOL/L — SIGNIFICANT CHANGE UP (ref 22–31)
CO2 SERPL-SCNC: 29 MMOL/L
CO2 SERPL-SCNC: 29 MMOL/L — SIGNIFICANT CHANGE UP (ref 22–31)
CO2 SERPL-SCNC: 30 MMOL/L — SIGNIFICANT CHANGE UP (ref 22–31)
CO2 SERPL-SCNC: 32 MMOL/L — HIGH (ref 22–31)
COLOR SPEC: YELLOW — SIGNIFICANT CHANGE UP
COMMENT - URINE: SIGNIFICANT CHANGE UP
COVID-19 SPIKE DOMAIN AB INTERP: NEGATIVE — SIGNIFICANT CHANGE UP
COVID-19 SPIKE DOMAIN ANTIBODY RESULT: 0.4 U/ML — SIGNIFICANT CHANGE UP
CREAT SERPL-MCNC: 1.16 MG/DL — SIGNIFICANT CHANGE UP (ref 0.5–1.3)
CREAT SERPL-MCNC: 1.2 MG/DL
CREAT SERPL-MCNC: 1.21 MG/DL — SIGNIFICANT CHANGE UP (ref 0.5–1.3)
CREAT SERPL-MCNC: 1.23 MG/DL — SIGNIFICANT CHANGE UP (ref 0.5–1.3)
CREAT SERPL-MCNC: 1.28 MG/DL
CREAT SERPL-MCNC: 1.29 MG/DL — SIGNIFICANT CHANGE UP (ref 0.5–1.3)
CULTURE RESULTS: SIGNIFICANT CHANGE UP
DIFF PNL FLD: ABNORMAL
EOSINOPHIL # BLD AUTO: 0.17 K/UL
EOSINOPHIL # BLD AUTO: 0.22 K/UL — SIGNIFICANT CHANGE UP (ref 0–0.5)
EOSINOPHIL # BLD AUTO: 0.22 K/UL — SIGNIFICANT CHANGE UP (ref 0–0.5)
EOSINOPHIL NFR BLD AUTO: 3.7 %
EOSINOPHIL NFR BLD AUTO: 5.3 % — SIGNIFICANT CHANGE UP (ref 0–6)
EOSINOPHIL NFR BLD AUTO: 6.2 % — HIGH (ref 0–6)
EPI CELLS # UR: SIGNIFICANT CHANGE UP /HPF (ref 0–5)
ESTIMATED AVERAGE GLUCOSE: 120 MG/DL
ESTIMATED AVERAGE GLUCOSE: 123 MG/DL — HIGH (ref 68–114)
FERRITIN SERPL-MCNC: 71 NG/ML — SIGNIFICANT CHANGE UP (ref 15–150)
GLUCOSE SERPL-MCNC: 123 MG/DL
GLUCOSE SERPL-MCNC: 89 MG/DL — SIGNIFICANT CHANGE UP (ref 70–99)
GLUCOSE SERPL-MCNC: 91 MG/DL — SIGNIFICANT CHANGE UP (ref 70–99)
GLUCOSE SERPL-MCNC: 92 MG/DL — SIGNIFICANT CHANGE UP (ref 70–99)
GLUCOSE SERPL-MCNC: 93 MG/DL
GLUCOSE SERPL-MCNC: 95 MG/DL — SIGNIFICANT CHANGE UP (ref 70–99)
GLUCOSE UR QL: NEGATIVE — SIGNIFICANT CHANGE UP
HBA1C MFR BLD HPLC: 5.8 %
HCT VFR BLD CALC: 29.6 % — LOW (ref 34.5–45)
HCT VFR BLD CALC: 30 % — LOW (ref 34.5–45)
HCT VFR BLD CALC: 30.8 %
HCT VFR BLD CALC: 31.8 % — LOW (ref 34.5–45)
HCT VFR BLD CALC: 32 % — LOW (ref 34.5–45)
HDLC SERPL-MCNC: 43 MG/DL — LOW
HDLC SERPL-MCNC: 49 MG/DL
HDLC SERPL-MCNC: 51 MG/DL
HGB BLD-MCNC: 9 G/DL
HGB BLD-MCNC: 9 G/DL — LOW (ref 11.5–15.5)
HGB BLD-MCNC: 9 G/DL — LOW (ref 11.5–15.5)
HGB BLD-MCNC: 9.4 G/DL — LOW (ref 11.5–15.5)
HGB BLD-MCNC: 9.7 G/DL — LOW (ref 11.5–15.5)
IMM GRANULOCYTES NFR BLD AUTO: 0.2 %
IMM GRANULOCYTES NFR BLD AUTO: 0.2 % — SIGNIFICANT CHANGE UP (ref 0–1.5)
IMM GRANULOCYTES NFR BLD AUTO: 0.3 % — SIGNIFICANT CHANGE UP (ref 0–1.5)
INR BLD: 2.09 — HIGH (ref 0.88–1.16)
INR BLD: 2.21 — HIGH (ref 0.88–1.16)
IRON SATN MFR SERPL: 14 % — SIGNIFICANT CHANGE UP (ref 14–50)
IRON SATN MFR SERPL: 45 UG/DL — SIGNIFICANT CHANGE UP (ref 30–160)
KETONES UR-MCNC: NEGATIVE — SIGNIFICANT CHANGE UP
LDLC SERPL CALC-MCNC: 47 MG/DL
LDLC SERPL CALC-MCNC: 49 MG/DL
LEUKOCYTE ESTERASE UR-ACNC: ABNORMAL
LIDOCAIN IGE QN: 8 U/L — SIGNIFICANT CHANGE UP (ref 7–60)
LIPID PNL WITH DIRECT LDL SERPL: 45 MG/DL — SIGNIFICANT CHANGE UP
LYMPHOCYTES # BLD AUTO: 0.92 K/UL — LOW (ref 1–3.3)
LYMPHOCYTES # BLD AUTO: 0.93 K/UL — LOW (ref 1–3.3)
LYMPHOCYTES # BLD AUTO: 1.01 K/UL
LYMPHOCYTES # BLD AUTO: 22 % — SIGNIFICANT CHANGE UP (ref 13–44)
LYMPHOCYTES # BLD AUTO: 26.3 % — SIGNIFICANT CHANGE UP (ref 13–44)
LYMPHOCYTES NFR BLD AUTO: 22.1 %
MAGNESIUM SERPL-MCNC: 2.1 MG/DL — SIGNIFICANT CHANGE UP (ref 1.6–2.6)
MAGNESIUM SERPL-MCNC: 2.2 MG/DL — SIGNIFICANT CHANGE UP (ref 1.6–2.6)
MAGNESIUM SERPL-MCNC: 2.4 MG/DL
MAN DIFF?: NORMAL
MCHC RBC-ENTMCNC: 27.6 PG — SIGNIFICANT CHANGE UP (ref 27–34)
MCHC RBC-ENTMCNC: 27.7 PG — SIGNIFICANT CHANGE UP (ref 27–34)
MCHC RBC-ENTMCNC: 28.1 PG — SIGNIFICANT CHANGE UP (ref 27–34)
MCHC RBC-ENTMCNC: 28.1 PG — SIGNIFICANT CHANGE UP (ref 27–34)
MCHC RBC-ENTMCNC: 28.5 PG
MCHC RBC-ENTMCNC: 29.2 GM/DL
MCHC RBC-ENTMCNC: 29.4 GM/DL — LOW (ref 32–36)
MCHC RBC-ENTMCNC: 30 GM/DL — LOW (ref 32–36)
MCHC RBC-ENTMCNC: 30.4 GM/DL — LOW (ref 32–36)
MCHC RBC-ENTMCNC: 30.5 GM/DL — LOW (ref 32–36)
MCV RBC AUTO: 90.6 FL — SIGNIFICANT CHANGE UP (ref 80–100)
MCV RBC AUTO: 92.5 FL — SIGNIFICANT CHANGE UP (ref 80–100)
MCV RBC AUTO: 93.8 FL — SIGNIFICANT CHANGE UP (ref 80–100)
MCV RBC AUTO: 94.4 FL — SIGNIFICANT CHANGE UP (ref 80–100)
MCV RBC AUTO: 97.5 FL
MONOCYTES # BLD AUTO: 0.39 K/UL
MONOCYTES # BLD AUTO: 0.39 K/UL — SIGNIFICANT CHANGE UP (ref 0–0.9)
MONOCYTES # BLD AUTO: 0.46 K/UL — SIGNIFICANT CHANGE UP (ref 0–0.9)
MONOCYTES NFR BLD AUTO: 11 % — SIGNIFICANT CHANGE UP (ref 2–14)
MONOCYTES NFR BLD AUTO: 11 % — SIGNIFICANT CHANGE UP (ref 2–14)
MONOCYTES NFR BLD AUTO: 8.5 %
NEUTROPHILS # BLD AUTO: 1.97 K/UL — SIGNIFICANT CHANGE UP (ref 1.8–7.4)
NEUTROPHILS # BLD AUTO: 2.55 K/UL — SIGNIFICANT CHANGE UP (ref 1.8–7.4)
NEUTROPHILS # BLD AUTO: 2.96 K/UL
NEUTROPHILS NFR BLD AUTO: 55.6 % — SIGNIFICANT CHANGE UP (ref 43–77)
NEUTROPHILS NFR BLD AUTO: 61 % — SIGNIFICANT CHANGE UP (ref 43–77)
NEUTROPHILS NFR BLD AUTO: 64.8 %
NITRITE UR-MCNC: NEGATIVE — SIGNIFICANT CHANGE UP
NON HDL CHOLESTEROL: 54 MG/DL — SIGNIFICANT CHANGE UP
NONHDLC SERPL-MCNC: 54 MG/DL
NONHDLC SERPL-MCNC: 57 MG/DL
NRBC # BLD: 0 /100 WBCS — SIGNIFICANT CHANGE UP (ref 0–0)
NT-PROBNP SERPL-MCNC: ABNORMAL PG/ML
NT-PROBNP SERPL-MCNC: ABNORMAL PG/ML
NT-PROBNP SERPL-SCNC: HIGH PG/ML (ref 0–300)
PH UR: 6.5 — SIGNIFICANT CHANGE UP (ref 5–8)
PLATELET # BLD AUTO: 169 K/UL — SIGNIFICANT CHANGE UP (ref 150–400)
PLATELET # BLD AUTO: 171 K/UL — SIGNIFICANT CHANGE UP (ref 150–400)
PLATELET # BLD AUTO: 172 K/UL — SIGNIFICANT CHANGE UP (ref 150–400)
PLATELET # BLD AUTO: 193 K/UL — SIGNIFICANT CHANGE UP (ref 150–400)
PLATELET # BLD AUTO: 199 K/UL
POTASSIUM SERPL-MCNC: 3.7 MMOL/L — SIGNIFICANT CHANGE UP (ref 3.5–5.3)
POTASSIUM SERPL-MCNC: 3.8 MMOL/L — SIGNIFICANT CHANGE UP (ref 3.5–5.3)
POTASSIUM SERPL-MCNC: 4.1 MMOL/L — SIGNIFICANT CHANGE UP (ref 3.5–5.3)
POTASSIUM SERPL-MCNC: 4.2 MMOL/L — SIGNIFICANT CHANGE UP (ref 3.5–5.3)
POTASSIUM SERPL-SCNC: 3.7 MMOL/L
POTASSIUM SERPL-SCNC: 3.7 MMOL/L — SIGNIFICANT CHANGE UP (ref 3.5–5.3)
POTASSIUM SERPL-SCNC: 3.8 MMOL/L — SIGNIFICANT CHANGE UP (ref 3.5–5.3)
POTASSIUM SERPL-SCNC: 4.1 MMOL/L
POTASSIUM SERPL-SCNC: 4.1 MMOL/L — SIGNIFICANT CHANGE UP (ref 3.5–5.3)
POTASSIUM SERPL-SCNC: 4.2 MMOL/L — SIGNIFICANT CHANGE UP (ref 3.5–5.3)
PROT SERPL-MCNC: 6.4 G/DL
PROT SERPL-MCNC: 7.2 G/DL — SIGNIFICANT CHANGE UP (ref 6–8.3)
PROT UR-MCNC: 100 MG/DL
PROTHROM AB SERPL-ACNC: 24.2 SEC — HIGH (ref 10.6–13.6)
PROTHROM AB SERPL-ACNC: 25.5 SEC — HIGH (ref 10.6–13.6)
RBC # BLD: 3.16 M/UL
RBC # BLD: 3.2 M/UL — LOW (ref 3.8–5.2)
RBC # BLD: 3.2 M/UL — LOW (ref 3.8–5.2)
RBC # BLD: 3.39 M/UL — LOW (ref 3.8–5.2)
RBC # BLD: 3.51 M/UL — LOW (ref 3.8–5.2)
RBC # FLD: 15.2 %
RBC # FLD: 16.2 % — HIGH (ref 10.3–14.5)
RBC # FLD: 16.3 % — HIGH (ref 10.3–14.5)
RBC # FLD: 16.4 % — HIGH (ref 10.3–14.5)
RBC # FLD: 16.6 % — HIGH (ref 10.3–14.5)
RBC CASTS # UR COMP ASSIST: > 10 /HPF
RH IG SCN BLD-IMP: POSITIVE — SIGNIFICANT CHANGE UP
RH IG SCN BLD-IMP: POSITIVE — SIGNIFICANT CHANGE UP
SARS-COV-2 IGG+IGM SERPL QL IA: 0.4 U/ML — SIGNIFICANT CHANGE UP
SARS-COV-2 IGG+IGM SERPL QL IA: NEGATIVE — SIGNIFICANT CHANGE UP
SARS-COV-2 RNA SPEC QL NAA+PROBE: SIGNIFICANT CHANGE UP
SODIUM SERPL-SCNC: 140 MMOL/L
SODIUM SERPL-SCNC: 140 MMOL/L
SODIUM SERPL-SCNC: 140 MMOL/L — SIGNIFICANT CHANGE UP (ref 135–145)
SODIUM SERPL-SCNC: 141 MMOL/L — SIGNIFICANT CHANGE UP (ref 135–145)
SODIUM SERPL-SCNC: 141 MMOL/L — SIGNIFICANT CHANGE UP (ref 135–145)
SODIUM SERPL-SCNC: 142 MMOL/L — SIGNIFICANT CHANGE UP (ref 135–145)
SP GR SPEC: 1.02 — SIGNIFICANT CHANGE UP (ref 1–1.03)
SPECIMEN SOURCE: SIGNIFICANT CHANGE UP
T3 SERPL-MCNC: 49 NG/DL — LOW (ref 80–200)
T4 FREE SERPL-MCNC: 1.24 NG/DL — SIGNIFICANT CHANGE UP (ref 0.93–1.7)
TIBC SERPL-MCNC: 318 UG/DL — SIGNIFICANT CHANGE UP (ref 220–430)
TRANSFERRIN SERPL-MCNC: 267 MG/DL — SIGNIFICANT CHANGE UP (ref 200–360)
TRIGL SERPL-MCNC: 37 MG/DL
TRIGL SERPL-MCNC: 41 MG/DL
TRIGL SERPL-MCNC: 46 MG/DL — SIGNIFICANT CHANGE UP
TROPONIN T SERPL-MCNC: 0.03 NG/ML — HIGH (ref 0–0.01)
TROPONIN T SERPL-MCNC: 0.03 NG/ML — HIGH (ref 0–0.01)
TROPONIN T SERPL-MCNC: 0.04 NG/ML — CRITICAL HIGH (ref 0–0.01)
TSH SERPL-MCNC: 19.54 UIU/ML — HIGH (ref 0.27–4.2)
UIBC SERPL-MCNC: 273 UG/DL — SIGNIFICANT CHANGE UP (ref 110–370)
UROBILINOGEN FLD QL: 1 E.U./DL — SIGNIFICANT CHANGE UP
WBC # BLD: 3.54 K/UL — LOW (ref 3.8–10.5)
WBC # BLD: 4.13 K/UL — SIGNIFICANT CHANGE UP (ref 3.8–10.5)
WBC # BLD: 4.18 K/UL — SIGNIFICANT CHANGE UP (ref 3.8–10.5)
WBC # BLD: 4.69 K/UL — SIGNIFICANT CHANGE UP (ref 3.8–10.5)
WBC # FLD AUTO: 3.54 K/UL — LOW (ref 3.8–10.5)
WBC # FLD AUTO: 4.13 K/UL — SIGNIFICANT CHANGE UP (ref 3.8–10.5)
WBC # FLD AUTO: 4.18 K/UL — SIGNIFICANT CHANGE UP (ref 3.8–10.5)
WBC # FLD AUTO: 4.57 K/UL
WBC # FLD AUTO: 4.69 K/UL — SIGNIFICANT CHANGE UP (ref 3.8–10.5)
WBC UR QL: > 10 /HPF

## 2021-01-01 PROCEDURE — 86850 RBC ANTIBODY SCREEN: CPT

## 2021-01-01 PROCEDURE — 99349 HOME/RES VST EST MOD MDM 40: CPT

## 2021-01-01 PROCEDURE — G1004: CPT

## 2021-01-01 PROCEDURE — 81001 URINALYSIS AUTO W/SCOPE: CPT

## 2021-01-01 PROCEDURE — 71045 X-RAY EXAM CHEST 1 VIEW: CPT | Mod: 26

## 2021-01-01 PROCEDURE — 87086 URINE CULTURE/COLONY COUNT: CPT

## 2021-01-01 PROCEDURE — 97162 PT EVAL MOD COMPLEX 30 MIN: CPT

## 2021-01-01 PROCEDURE — 83550 IRON BINDING TEST: CPT

## 2021-01-01 PROCEDURE — U0003: CPT

## 2021-01-01 PROCEDURE — 83036 HEMOGLOBIN GLYCOSYLATED A1C: CPT

## 2021-01-01 PROCEDURE — 70450 CT HEAD/BRAIN W/O DYE: CPT | Mod: 26,MG

## 2021-01-01 PROCEDURE — 94640 AIRWAY INHALATION TREATMENT: CPT

## 2021-01-01 PROCEDURE — 71045 X-RAY EXAM CHEST 1 VIEW: CPT

## 2021-01-01 PROCEDURE — 93000 ELECTROCARDIOGRAM COMPLETE: CPT

## 2021-01-01 PROCEDURE — U0005: CPT

## 2021-01-01 PROCEDURE — 36415 COLL VENOUS BLD VENIPUNCTURE: CPT

## 2021-01-01 PROCEDURE — 83880 ASSAY OF NATRIURETIC PEPTIDE: CPT

## 2021-01-01 PROCEDURE — 99285 EMERGENCY DEPT VISIT HI MDM: CPT | Mod: CS,25

## 2021-01-01 PROCEDURE — 99285 EMERGENCY DEPT VISIT HI MDM: CPT

## 2021-01-01 PROCEDURE — 85025 COMPLETE CBC W/AUTO DIFF WBC: CPT

## 2021-01-01 PROCEDURE — 80048 BASIC METABOLIC PNL TOTAL CA: CPT

## 2021-01-01 PROCEDURE — 83540 ASSAY OF IRON: CPT

## 2021-01-01 PROCEDURE — 97110 THERAPEUTIC EXERCISES: CPT

## 2021-01-01 PROCEDURE — 85610 PROTHROMBIN TIME: CPT

## 2021-01-01 PROCEDURE — 82553 CREATINE MB FRACTION: CPT

## 2021-01-01 PROCEDURE — 70450 CT HEAD/BRAIN W/O DYE: CPT

## 2021-01-01 PROCEDURE — 83735 ASSAY OF MAGNESIUM: CPT

## 2021-01-01 PROCEDURE — 85730 THROMBOPLASTIN TIME PARTIAL: CPT

## 2021-01-01 PROCEDURE — 80061 LIPID PANEL: CPT

## 2021-01-01 PROCEDURE — 85027 COMPLETE CBC AUTOMATED: CPT

## 2021-01-01 PROCEDURE — 97530 THERAPEUTIC ACTIVITIES: CPT

## 2021-01-01 PROCEDURE — 86769 SARS-COV-2 COVID-19 ANTIBODY: CPT

## 2021-01-01 PROCEDURE — 84443 ASSAY THYROID STIM HORMONE: CPT

## 2021-01-01 PROCEDURE — 86901 BLOOD TYPING SEROLOGIC RH(D): CPT

## 2021-01-01 PROCEDURE — 82550 ASSAY OF CK (CPK): CPT

## 2021-01-01 PROCEDURE — 86900 BLOOD TYPING SEROLOGIC ABO: CPT

## 2021-01-01 PROCEDURE — 84466 ASSAY OF TRANSFERRIN: CPT

## 2021-01-01 PROCEDURE — 82962 GLUCOSE BLOOD TEST: CPT

## 2021-01-01 PROCEDURE — 99239 HOSP IP/OBS DSCHRG MGMT >30: CPT

## 2021-01-01 PROCEDURE — 83690 ASSAY OF LIPASE: CPT

## 2021-01-01 PROCEDURE — 84484 ASSAY OF TROPONIN QUANT: CPT

## 2021-01-01 PROCEDURE — 93306 TTE W/DOPPLER COMPLETE: CPT

## 2021-01-01 PROCEDURE — 99222 1ST HOSP IP/OBS MODERATE 55: CPT

## 2021-01-01 PROCEDURE — 99214 OFFICE O/P EST MOD 30 MIN: CPT

## 2021-01-01 PROCEDURE — 84480 ASSAY TRIIODOTHYRONINE (T3): CPT

## 2021-01-01 PROCEDURE — 99233 SBSQ HOSP IP/OBS HIGH 50: CPT

## 2021-01-01 PROCEDURE — 80053 COMPREHEN METABOLIC PANEL: CPT

## 2021-01-01 PROCEDURE — 93306 TTE W/DOPPLER COMPLETE: CPT | Mod: 26

## 2021-01-01 PROCEDURE — 82728 ASSAY OF FERRITIN: CPT

## 2021-01-01 PROCEDURE — 84439 ASSAY OF FREE THYROXINE: CPT

## 2021-01-01 RX ORDER — TIOTROPIUM BROMIDE 18 UG/1
1 CAPSULE ORAL; RESPIRATORY (INHALATION)
Qty: 1 | Refills: 3
Start: 2021-01-01 | End: 2021-09-04

## 2021-01-01 RX ORDER — ATORVASTATIN CALCIUM 80 MG/1
40 TABLET, FILM COATED ORAL AT BEDTIME
Refills: 0 | Status: DISCONTINUED | OUTPATIENT
Start: 2021-01-01 | End: 2021-01-01

## 2021-01-01 RX ORDER — IPRATROPIUM/ALBUTEROL SULFATE 18-103MCG
3 AEROSOL WITH ADAPTER (GRAM) INHALATION EVERY 6 HOURS
Refills: 0 | Status: DISCONTINUED | OUTPATIENT
Start: 2021-01-01 | End: 2021-01-01

## 2021-01-01 RX ORDER — APIXABAN 2.5 MG/1
2.5 TABLET, FILM COATED ORAL EVERY 12 HOURS
Refills: 0 | Status: DISCONTINUED | OUTPATIENT
Start: 2021-01-01 | End: 2021-01-01

## 2021-01-01 RX ORDER — APIXABAN 2.5 MG/1
1 TABLET, FILM COATED ORAL
Qty: 0 | Refills: 0 | DISCHARGE

## 2021-01-01 RX ORDER — FUROSEMIDE 40 MG/1
40 TABLET ORAL
Qty: 90 | Refills: 3 | Status: ACTIVE | COMMUNITY
Start: 2020-01-01 | End: 1900-01-01

## 2021-01-01 RX ORDER — CARVEDILOL PHOSPHATE 80 MG/1
1 CAPSULE, EXTENDED RELEASE ORAL
Qty: 0 | Refills: 0 | DISCHARGE

## 2021-01-01 RX ORDER — IVABRADINE 7.5 MG/1
1 TABLET, FILM COATED ORAL
Qty: 60 | Refills: 0
Start: 2021-01-01 | End: 2021-01-01

## 2021-01-01 RX ORDER — ATORVASTATIN CALCIUM 40 MG/1
40 TABLET, FILM COATED ORAL DAILY
Qty: 90 | Refills: 3 | Status: ACTIVE | COMMUNITY
Start: 2020-01-01 | End: 1900-01-01

## 2021-01-01 RX ORDER — FUROSEMIDE 40 MG
40 TABLET ORAL ONCE
Refills: 0 | Status: COMPLETED | OUTPATIENT
Start: 2021-01-01 | End: 2021-01-01

## 2021-01-01 RX ORDER — GABAPENTIN 400 MG/1
0 CAPSULE ORAL
Qty: 0 | Refills: 0 | DISCHARGE

## 2021-01-01 RX ORDER — ALBUTEROL 90 UG/1
2 AEROSOL, METERED ORAL EVERY 6 HOURS
Refills: 0 | Status: DISCONTINUED | OUTPATIENT
Start: 2021-01-01 | End: 2021-01-01

## 2021-01-01 RX ORDER — VENLAFAXINE 75 MG/1
75 TABLET ORAL DAILY
Qty: 90 | Refills: 0 | Status: ACTIVE | COMMUNITY
Start: 2020-03-05 | End: 1900-01-01

## 2021-01-01 RX ORDER — APIXABAN 2.5 MG/1
2.5 TABLET, FILM COATED ORAL
Qty: 60 | Refills: 3 | Status: ACTIVE | COMMUNITY
Start: 2020-04-23

## 2021-01-01 RX ORDER — SENNA PLUS 8.6 MG/1
2 TABLET ORAL AT BEDTIME
Refills: 0 | Status: DISCONTINUED | OUTPATIENT
Start: 2021-01-01 | End: 2021-01-01

## 2021-01-01 RX ORDER — APIXABAN 2.5 MG/1
1 TABLET, FILM COATED ORAL
Qty: 60 | Refills: 3
Start: 2021-01-01 | End: 2021-09-04

## 2021-01-01 RX ORDER — SACUBITRIL AND VALSARTAN 49; 51 MG/1; MG/1
49-51 TABLET, FILM COATED ORAL
Qty: 60 | Refills: 3 | Status: ACTIVE | COMMUNITY
Start: 2021-01-01 | End: 1900-01-01

## 2021-01-01 RX ORDER — LEVOTHYROXINE SODIUM 125 MCG
1 TABLET ORAL
Qty: 30 | Refills: 3
Start: 2021-01-01 | End: 2021-09-04

## 2021-01-01 RX ORDER — SACUBITRIL AND VALSARTAN 24; 26 MG/1; MG/1
1 TABLET, FILM COATED ORAL
Refills: 0 | Status: DISCONTINUED | OUTPATIENT
Start: 2021-01-01 | End: 2021-01-01

## 2021-01-01 RX ORDER — ALBUTEROL 90 UG/1
2 AEROSOL, METERED ORAL
Qty: 0 | Refills: 0 | DISCHARGE

## 2021-01-01 RX ORDER — SENNA PLUS 8.6 MG/1
2 TABLET ORAL
Qty: 0 | Refills: 0 | DISCHARGE

## 2021-01-01 RX ORDER — LEVOTHYROXINE SODIUM 125 MCG
112 TABLET ORAL DAILY
Refills: 0 | Status: DISCONTINUED | OUTPATIENT
Start: 2021-01-01 | End: 2021-01-01

## 2021-01-01 RX ORDER — SENNOSIDES 8.6 MG/1
8.6 TABLET ORAL AT BEDTIME
Qty: 180 | Refills: 1 | Status: ACTIVE | COMMUNITY
Start: 2021-01-01 | End: 1900-01-01

## 2021-01-01 RX ORDER — ALBUTEROL SULFATE 90 UG/1
108 (90 BASE) INHALANT RESPIRATORY (INHALATION)
Qty: 54 | Refills: 3 | Status: ACTIVE | COMMUNITY
Start: 2020-01-01 | End: 1900-01-01

## 2021-01-01 RX ORDER — VENLAFAXINE HCL 75 MG
75 CAPSULE, EXT RELEASE 24 HR ORAL DAILY
Refills: 0 | Status: DISCONTINUED | OUTPATIENT
Start: 2021-01-01 | End: 2021-01-01

## 2021-01-01 RX ORDER — FUROSEMIDE 40 MG
40 TABLET ORAL
Refills: 0 | Status: DISCONTINUED | OUTPATIENT
Start: 2021-01-01 | End: 2021-01-01

## 2021-01-01 RX ORDER — FUROSEMIDE 40 MG
40 TABLET ORAL DAILY
Refills: 0 | Status: DISCONTINUED | OUTPATIENT
Start: 2021-01-01 | End: 2021-01-01

## 2021-01-01 RX ORDER — FERROUS SULFATE 325(65) MG
325 TABLET ORAL DAILY
Refills: 0 | Status: DISCONTINUED | OUTPATIENT
Start: 2021-01-01 | End: 2021-01-01

## 2021-01-01 RX ORDER — IVABRADINE 7.5 MG/1
1 TABLET, FILM COATED ORAL
Qty: 0 | Refills: 0 | DISCHARGE

## 2021-01-01 RX ORDER — IVABRADINE 5 MG/1
5 TABLET, FILM COATED ORAL
Qty: 60 | Refills: 3 | Status: ACTIVE | COMMUNITY
Start: 2020-01-01 | End: 1900-01-01

## 2021-01-01 RX ORDER — ISOSORBIDE MONONITRATE 30 MG/1
30 TABLET, EXTENDED RELEASE ORAL DAILY
Qty: 90 | Refills: 3 | Status: DISCONTINUED | COMMUNITY
End: 2021-01-01

## 2021-01-01 RX ORDER — FUROSEMIDE 40 MG
1 TABLET ORAL
Qty: 0 | Refills: 0 | DISCHARGE

## 2021-01-01 RX ORDER — FERROUS SULFATE 325(65) MG
1 TABLET ORAL
Qty: 0 | Refills: 0 | DISCHARGE

## 2021-01-01 RX ORDER — CARVEDILOL 25 MG/1
25 TABLET, FILM COATED ORAL TWICE DAILY
Qty: 180 | Refills: 3 | Status: ACTIVE | COMMUNITY
Start: 2021-01-01 | End: 1900-01-01

## 2021-01-01 RX ORDER — CEFTRIAXONE 500 MG/1
1000 INJECTION, POWDER, FOR SOLUTION INTRAMUSCULAR; INTRAVENOUS ONCE
Refills: 0 | Status: COMPLETED | OUTPATIENT
Start: 2021-01-01 | End: 2021-01-01

## 2021-01-01 RX ORDER — DIGOXIN 125 UG/1
125 TABLET ORAL
Qty: 90 | Refills: 3 | Status: DISCONTINUED | COMMUNITY
End: 2021-01-01

## 2021-01-01 RX ORDER — LEVOTHYROXINE SODIUM 125 MCG
1 TABLET ORAL
Qty: 0 | Refills: 0 | DISCHARGE

## 2021-01-01 RX ORDER — POTASSIUM CHLORIDE 20 MEQ
40 PACKET (EA) ORAL ONCE
Refills: 0 | Status: COMPLETED | OUTPATIENT
Start: 2021-01-01 | End: 2021-01-01

## 2021-01-01 RX ORDER — ATORVASTATIN CALCIUM 80 MG/1
1 TABLET, FILM COATED ORAL
Qty: 0 | Refills: 0 | DISCHARGE

## 2021-01-01 RX ORDER — VENLAFAXINE HCL 75 MG
1 CAPSULE, EXT RELEASE 24 HR ORAL
Qty: 0 | Refills: 0 | DISCHARGE

## 2021-01-01 RX ORDER — SACUBITRIL AND VALSARTAN 24; 26 MG/1; MG/1
1 TABLET, FILM COATED ORAL
Qty: 0 | Refills: 0 | DISCHARGE

## 2021-01-01 RX ORDER — LEVOTHYROXINE SODIUM 125 MCG
100 TABLET ORAL DAILY
Refills: 0 | Status: DISCONTINUED | OUTPATIENT
Start: 2021-01-01 | End: 2021-01-01

## 2021-01-01 RX ORDER — GABAPENTIN 400 MG/1
100 CAPSULE ORAL
Refills: 0 | Status: DISCONTINUED | OUTPATIENT
Start: 2021-01-01 | End: 2021-01-01

## 2021-01-01 RX ORDER — VENLAFAXINE HCL 75 MG
75 CAPSULE, EXT RELEASE 24 HR ORAL EVERY 24 HOURS
Refills: 0 | Status: DISCONTINUED | OUTPATIENT
Start: 2021-01-01 | End: 2021-01-01

## 2021-01-01 RX ORDER — LEVOTHYROXINE SODIUM 0.11 MG/1
112 TABLET ORAL DAILY
Qty: 90 | Refills: 3 | Status: ACTIVE | COMMUNITY
Start: 2021-01-01

## 2021-01-01 RX ORDER — FUROSEMIDE 40 MG
20 TABLET ORAL ONCE
Refills: 0 | Status: COMPLETED | OUTPATIENT
Start: 2021-01-01 | End: 2021-01-01

## 2021-01-01 RX ORDER — APIXABAN 2.5 MG/1
5 TABLET, FILM COATED ORAL
Refills: 0 | Status: DISCONTINUED | OUTPATIENT
Start: 2021-01-01 | End: 2021-01-01

## 2021-01-01 RX ORDER — CARVEDILOL PHOSPHATE 80 MG/1
25 CAPSULE, EXTENDED RELEASE ORAL EVERY 12 HOURS
Refills: 0 | Status: DISCONTINUED | OUTPATIENT
Start: 2021-01-01 | End: 2021-01-01

## 2021-01-01 RX ORDER — TIOTROPIUM BROMIDE 18 UG/1
1 CAPSULE ORAL; RESPIRATORY (INHALATION) DAILY
Refills: 0 | Status: DISCONTINUED | OUTPATIENT
Start: 2021-01-01 | End: 2021-01-01

## 2021-01-01 RX ORDER — CEFTRIAXONE 500 MG/1
INJECTION, POWDER, FOR SOLUTION INTRAMUSCULAR; INTRAVENOUS
Refills: 0 | Status: COMPLETED | OUTPATIENT
Start: 2021-01-01 | End: 2021-01-01

## 2021-01-01 RX ORDER — ENALAPRIL MALEATE 10 MG/1
10 TABLET ORAL TWICE DAILY
Qty: 180 | Refills: 0 | Status: DISCONTINUED | COMMUNITY
Start: 2020-03-05 | End: 2021-01-01

## 2021-01-01 RX ORDER — CEFTRIAXONE 500 MG/1
1000 INJECTION, POWDER, FOR SOLUTION INTRAMUSCULAR; INTRAVENOUS EVERY 24 HOURS
Refills: 0 | Status: COMPLETED | OUTPATIENT
Start: 2021-01-01 | End: 2021-01-01

## 2021-01-01 RX ADMIN — TIOTROPIUM BROMIDE 1 CAPSULE(S): 18 CAPSULE ORAL; RESPIRATORY (INHALATION) at 09:56

## 2021-01-01 RX ADMIN — GABAPENTIN 100 MILLIGRAM(S): 400 CAPSULE ORAL at 09:19

## 2021-01-01 RX ADMIN — TIOTROPIUM BROMIDE 1 CAPSULE(S): 18 CAPSULE ORAL; RESPIRATORY (INHALATION) at 09:32

## 2021-01-01 RX ADMIN — CEFTRIAXONE 100 MILLIGRAM(S): 500 INJECTION, POWDER, FOR SOLUTION INTRAMUSCULAR; INTRAVENOUS at 18:00

## 2021-01-01 RX ADMIN — SACUBITRIL AND VALSARTAN 1 TABLET(S): 24; 26 TABLET, FILM COATED ORAL at 09:19

## 2021-01-01 RX ADMIN — GABAPENTIN 100 MILLIGRAM(S): 400 CAPSULE ORAL at 09:56

## 2021-01-01 RX ADMIN — GABAPENTIN 100 MILLIGRAM(S): 400 CAPSULE ORAL at 09:32

## 2021-01-01 RX ADMIN — APIXABAN 2.5 MILLIGRAM(S): 2.5 TABLET, FILM COATED ORAL at 05:45

## 2021-01-01 RX ADMIN — CARVEDILOL PHOSPHATE 25 MILLIGRAM(S): 80 CAPSULE, EXTENDED RELEASE ORAL at 23:06

## 2021-01-01 RX ADMIN — CARVEDILOL PHOSPHATE 25 MILLIGRAM(S): 80 CAPSULE, EXTENDED RELEASE ORAL at 09:32

## 2021-01-01 RX ADMIN — CEFTRIAXONE 100 MILLIGRAM(S): 500 INJECTION, POWDER, FOR SOLUTION INTRAMUSCULAR; INTRAVENOUS at 18:01

## 2021-01-01 RX ADMIN — Medication 20 MILLIGRAM(S): at 17:15

## 2021-01-01 RX ADMIN — SACUBITRIL AND VALSARTAN 1 TABLET(S): 24; 26 TABLET, FILM COATED ORAL at 09:32

## 2021-01-01 RX ADMIN — SENNA PLUS 2 TABLET(S): 8.6 TABLET ORAL at 23:06

## 2021-01-01 RX ADMIN — Medication 75 MILLIGRAM(S): at 09:19

## 2021-01-01 RX ADMIN — Medication 40 MILLIGRAM(S): at 17:19

## 2021-01-01 RX ADMIN — CARVEDILOL PHOSPHATE 25 MILLIGRAM(S): 80 CAPSULE, EXTENDED RELEASE ORAL at 09:56

## 2021-01-01 RX ADMIN — CARVEDILOL PHOSPHATE 25 MILLIGRAM(S): 80 CAPSULE, EXTENDED RELEASE ORAL at 23:05

## 2021-01-01 RX ADMIN — Medication 40 MILLIGRAM(S): at 11:01

## 2021-01-01 RX ADMIN — CARVEDILOL PHOSPHATE 25 MILLIGRAM(S): 80 CAPSULE, EXTENDED RELEASE ORAL at 09:19

## 2021-01-01 RX ADMIN — Medication 40 MILLIGRAM(S): at 17:08

## 2021-01-01 RX ADMIN — TIOTROPIUM BROMIDE 1 CAPSULE(S): 18 CAPSULE ORAL; RESPIRATORY (INHALATION) at 09:19

## 2021-01-01 RX ADMIN — CARVEDILOL PHOSPHATE 25 MILLIGRAM(S): 80 CAPSULE, EXTENDED RELEASE ORAL at 11:01

## 2021-01-01 RX ADMIN — TIOTROPIUM BROMIDE 1 CAPSULE(S): 18 CAPSULE ORAL; RESPIRATORY (INHALATION) at 11:02

## 2021-01-01 RX ADMIN — Medication 40 MILLIGRAM(S): at 23:06

## 2021-01-01 RX ADMIN — SACUBITRIL AND VALSARTAN 1 TABLET(S): 24; 26 TABLET, FILM COATED ORAL at 11:01

## 2021-01-01 RX ADMIN — APIXABAN 2.5 MILLIGRAM(S): 2.5 TABLET, FILM COATED ORAL at 21:52

## 2021-01-01 RX ADMIN — CEFTRIAXONE 100 MILLIGRAM(S): 500 INJECTION, POWDER, FOR SOLUTION INTRAMUSCULAR; INTRAVENOUS at 19:42

## 2021-01-01 RX ADMIN — Medication 40 MILLIGRAM(S): at 05:45

## 2021-01-01 RX ADMIN — Medication 40 MILLIEQUIVALENT(S): at 11:07

## 2021-01-01 RX ADMIN — GABAPENTIN 100 MILLIGRAM(S): 400 CAPSULE ORAL at 23:06

## 2021-01-01 RX ADMIN — GABAPENTIN 100 MILLIGRAM(S): 400 CAPSULE ORAL at 21:50

## 2021-01-01 RX ADMIN — SENNA PLUS 2 TABLET(S): 8.6 TABLET ORAL at 21:47

## 2021-01-01 RX ADMIN — Medication 325 MILLIGRAM(S): at 09:19

## 2021-01-01 RX ADMIN — SACUBITRIL AND VALSARTAN 1 TABLET(S): 24; 26 TABLET, FILM COATED ORAL at 23:06

## 2021-01-01 RX ADMIN — Medication 325 MILLIGRAM(S): at 11:01

## 2021-01-01 RX ADMIN — GABAPENTIN 100 MILLIGRAM(S): 400 CAPSULE ORAL at 11:01

## 2021-01-01 RX ADMIN — Medication 3 MILLILITER(S): at 15:23

## 2021-01-01 RX ADMIN — Medication 100 MICROGRAM(S): at 06:55

## 2021-01-01 RX ADMIN — ATORVASTATIN CALCIUM 40 MILLIGRAM(S): 80 TABLET, FILM COATED ORAL at 21:50

## 2021-01-01 RX ADMIN — ATORVASTATIN CALCIUM 40 MILLIGRAM(S): 80 TABLET, FILM COATED ORAL at 21:47

## 2021-01-01 RX ADMIN — APIXABAN 5 MILLIGRAM(S): 2.5 TABLET, FILM COATED ORAL at 09:19

## 2021-01-01 RX ADMIN — Medication 325 MILLIGRAM(S): at 09:32

## 2021-01-01 RX ADMIN — CARVEDILOL PHOSPHATE 25 MILLIGRAM(S): 80 CAPSULE, EXTENDED RELEASE ORAL at 21:50

## 2021-01-01 RX ADMIN — Medication 3 MILLILITER(S): at 21:46

## 2021-01-01 RX ADMIN — ATORVASTATIN CALCIUM 40 MILLIGRAM(S): 80 TABLET, FILM COATED ORAL at 23:06

## 2021-01-01 RX ADMIN — Medication 3 MILLILITER(S): at 11:36

## 2021-01-01 RX ADMIN — APIXABAN 2.5 MILLIGRAM(S): 2.5 TABLET, FILM COATED ORAL at 05:40

## 2021-01-01 RX ADMIN — APIXABAN 2.5 MILLIGRAM(S): 2.5 TABLET, FILM COATED ORAL at 17:19

## 2021-01-01 RX ADMIN — Medication 112 MICROGRAM(S): at 05:28

## 2021-01-01 RX ADMIN — Medication 112 MICROGRAM(S): at 05:45

## 2021-01-01 RX ADMIN — APIXABAN 5 MILLIGRAM(S): 2.5 TABLET, FILM COATED ORAL at 23:06

## 2021-01-01 RX ADMIN — Medication 75 MILLIGRAM(S): at 11:01

## 2021-01-01 RX ADMIN — Medication 3 MILLILITER(S): at 21:50

## 2021-01-01 RX ADMIN — Medication 75 MILLIGRAM(S): at 10:14

## 2021-01-01 RX ADMIN — Medication 40 MILLIGRAM(S): at 05:28

## 2021-01-01 RX ADMIN — SENNA PLUS 2 TABLET(S): 8.6 TABLET ORAL at 21:51

## 2021-01-01 RX ADMIN — SACUBITRIL AND VALSARTAN 1 TABLET(S): 24; 26 TABLET, FILM COATED ORAL at 21:47

## 2021-01-01 RX ADMIN — Medication 3 MILLILITER(S): at 09:56

## 2021-01-01 RX ADMIN — APIXABAN 5 MILLIGRAM(S): 2.5 TABLET, FILM COATED ORAL at 23:05

## 2021-01-01 RX ADMIN — GABAPENTIN 100 MILLIGRAM(S): 400 CAPSULE ORAL at 21:47

## 2021-01-01 RX ADMIN — APIXABAN 5 MILLIGRAM(S): 2.5 TABLET, FILM COATED ORAL at 11:01

## 2021-01-01 RX ADMIN — SACUBITRIL AND VALSARTAN 1 TABLET(S): 24; 26 TABLET, FILM COATED ORAL at 21:50

## 2021-01-01 RX ADMIN — Medication 40 MILLIGRAM(S): at 23:05

## 2021-01-01 RX ADMIN — SACUBITRIL AND VALSARTAN 1 TABLET(S): 24; 26 TABLET, FILM COATED ORAL at 09:56

## 2021-01-01 RX ADMIN — Medication 40 MILLIGRAM(S): at 17:46

## 2021-01-01 RX ADMIN — Medication 40 MILLIGRAM(S): at 05:40

## 2021-01-01 RX ADMIN — Medication 3 MILLILITER(S): at 09:32

## 2021-01-01 RX ADMIN — Medication 75 MILLIGRAM(S): at 09:56

## 2021-01-01 RX ADMIN — Medication 3 MILLILITER(S): at 16:17

## 2021-01-01 RX ADMIN — SACUBITRIL AND VALSARTAN 1 TABLET(S): 24; 26 TABLET, FILM COATED ORAL at 23:17

## 2021-01-01 RX ADMIN — Medication 112 MICROGRAM(S): at 05:40

## 2021-01-01 RX ADMIN — CARVEDILOL PHOSPHATE 25 MILLIGRAM(S): 80 CAPSULE, EXTENDED RELEASE ORAL at 21:48

## 2021-01-26 PROBLEM — H90.A31 MIXED CONDUCTIVE AND SENSORINEURAL HEARING LOSS OF RIGHT EAR WITH RESTRICTED HEARING OF LEFT EAR: Status: ACTIVE | Noted: 2020-03-04

## 2021-01-26 NOTE — HISTORY OF PRESENT ILLNESS
[Family Member] : family member [Formal Caregiver] : formal caregiver [FreeTextEntry1] : HTN, HLD, HFrEF (EF 25% from 02/2019), LBBB, pre-diabetes, RLS  [FreeTextEntry2] : EAMON WISE is an 89 year with HTN, HLD, HFrEF (EF 25% from 02/2019), LBBB, pre-diabetes, RLS        \par Follow up on chronic medical issuest with HCP Pebbles.  Reports patient is refusing to ambulate.  Denied pain.  No skin breakdown.  No fever or chills.  Requires HHA service.  Severe arthritis in the knee limited mobility. Vania reported patient is not breathing well and she thinks its her CHF.   Asking for an EKG, echo and a chest x-ray.  \par \par Patient denies fever, cough, trouble breathing, rash, vomiting and diarrhea. Patient has not been in close contact with someone covid positive.\par \par N95 mask, gloves, eye wear and gown used during visit: [Y]. Total face to face time with patient is 40 min.\par \par Working with family to establish immunization history\par \par \par Patient/ patient's caregiver reports no weight loss >10lbs in the past 6 months. No changes in dentition or swallowing reported, No changes in hearing or vision reported. No changes in Cognition reported. Patient denies any symptoms of depression or anxiety. Patient is ADL and IADL dependent. No changes in gait or falls reported. \par Patient's home environment is safe. \par Goals of care discussed. MOLST completed. \par \par \par

## 2021-01-26 NOTE — COUNSELING
[Normal Weight - ( BMI  <25 )] : normal weight - ( BMI  <25 ) [Hypertension self management education material provided] : hypertension self management education material provided [Non - Smoker] : non-smoker [Use assistive device to avoid falls] : use assistive device to avoid falls [] : foot exam [Patient not on disease-modifying anti-rheumatic drug due to overall prognosis] : Patient not on disease-modifying anti-rheumatic drug due to overall prognosis [Decrease stress] : decrease stress [Decrease hospital use] : decrease hospital use [Discussed disease trajectory with patient/caregiver] : discussed disease trajectory with patient/caregiver [Patient/Caregiver not ready to engage in discussion] : patient/caregiver not ready to engage in discussion [Full Code] : Code Status: Full Code [No Limitations] : Treatment Guidelines: No limitations [Long Term Intubation] : Intubation: Long term intubation [[facility] Date: _____] [facility] Completion/last verification date: [unfilled] [FreeTextEntry4] : Educated patient/legal representative about CCM services and consent.\par Educated patient/legal representative that this service is available because they have 2 or more chronic conditions, that only one Provider can furnish CCM Services per calendar month and that CCM services are subject to the usual Medicare deductible and coinsurance. \par Patient/legal representative understands the right to stop the CCM services at any time by notifying House Calls office.\par Patient/legal representative has verbally consented 10/2020\par \par

## 2021-01-26 NOTE — PHYSICAL EXAM
[No Acute Distress] : no acute distress [Well Nourished] : well nourished [Well Developed] : well developed [Normal Sclera/Conjunctiva] : normal sclera/conjunctiva [EOMI] : extra ocular movement intact [Normal Outer Ear/Nose] : the ears and nose were normal in appearance [Normal Oropharynx] : the oropharynx was normal [No JVD] : no jugular venous distention [No Respiratory Distress] : no respiratory distress [Clear to Auscultation] : lungs were clear to auscultation bilaterally [No Accessory Muscle Use] : no accessory muscle use [Normal Rate] : heart rate was normal  [Regular Rhythm] : with a regular rhythm [Normal S1, S2] : normal S1 and S2 [No Murmurs] : no murmurs heard [No Edema] : there was no peripheral edema [Normal Bowel Sounds] : normal bowel sounds [Non Tender] : non-tender [Soft] : abdomen soft [Not Distended] : not distended [Normal Post Cervical Nodes] : no posterior cervical lymphadenopathy [Normal Anterior Cervical Nodes] : no anterior cervical lymphadenopathy [No CVA Tenderness] : no ~M costovertebral angle tenderness [No Spinal Tenderness] : no spinal tenderness [Normal Strength/Tone] : muscle strength and tone were normal [No Rash] : no rash [Oriented x3] : oriented to person, place, and time [Normal Affect] : the affect was normal [de-identified] : Lao speaking.  Verbalizing needs.  No evidence of labored breathing or distress.  [de-identified] : Refusing to ambulate.  Denies pain.  Just does not want to walk.

## 2021-02-16 PROBLEM — Z86.79 HISTORY OF AORTIC INSUFFICIENCY: Status: ACTIVE | Noted: 2021-01-01

## 2021-02-16 NOTE — HISTORY OF PRESENT ILLNESS
[FreeTextEntry1] : 89 F HTN HLD LBBB IGT RLS Homebound referred from NP Joe for management of LV dysfunction recent hospitalization 1/2020 for sepsis influenza MRSA bacteremia completed course of ceftaroline here to establish care per her neice she is basically bed bound has been complaining of sob her "asthma" noticed her legs are more swollen is not really taking her meds twice a day misses her evening doses unclear why she is on eliquis \par \par DK EF 25-30% 1/2020\par echo 1/2021 EF 20% Dilated LV 5.9 cm severe TR moderate MR moderate to severe AI\par Normal perfusion 2016 \par

## 2021-02-16 NOTE — REASON FOR VISIT
Spine Discharge Summary    Patient ID:  Rock Hayes  [id number]  female  59 y.o.  1954    Admit date: 2/20/2019    Discharge date: 2/21/2019    Admitting Physician: Tracy Enriquez MD     Consulting Physician(s):   Treatment Team: Attending Provider: Mattie Barrios MD; Utilization Review: Oscar Penny RN; Nurse Practitioner: Rob Curran NP    Date of Surgery:   2/20/2019     Preoperative Diagnosis:  CERVICAL MYELOPATHY, HERNIATED CERVICAL DISC    Postoperative Diagnosis:   CERVICAL MYELOPATHY, HERNIATED CERVICAL DISC    Procedure(s):  2 LEVEL ANTERIOR CERVICAL DISCECTOMY WITH FUSION C5,6 C6,7 WITH ZERO PROFILE AND CONDUCT MATRIX GRAFT     Anesthesia Type:   General     Surgeon: Mattie Barrios MD                            HPI:  Pt is a 59 y.o. female who has a history of CERVICAL MYELOPATHY, [facility]  with pain and limitations of activities of daily living who presents at this time for an ACDF following the failure of conservative management. PMH:   Past Medical History:   Diagnosis Date    Adverse effect of anesthesia     Pt states her daughter had an Anaphylactic reaction to Anesthesia    Arthritis     Back pain     Hypertension     Single episode    Menopause     Neck pain     Psychiatric disorder     Depression, Anxiety    Thyroid disease        Body mass index is 23.64 kg/m². : A BMI > 30 is classified as obesity and > 40 is classified as morbid obesity. Medications upon admission :   Prior to Admission Medications   Prescriptions Last Dose Informant Patient Reported? Taking? alendronate (FOSAMAX) 70 mg tablet 2/9/2019  Yes No   Sig: Take 70 mg by mouth. atorvastatin (LIPITOR) 10 mg tablet 2/19/2019 at Unknown time  No Yes   Sig: Take 1 Tab by mouth daily.    diclofenac EC (VOLTAREN) 75 mg EC tablet 2/19/2019 at Unknown time  No Yes   Sig: TAKE 1 TABLET BY MOUTH TWICE A DAY   levothyroxine (SYNTHROID) 125 mcg tablet 2/20/2019 at 0600  No Yes   Sig: TAKE 1 TABLET BY MOUTH EVERY DAY   multivitamin (ONE A DAY) tablet 2/13/2019 at Unknown time  Yes Yes   Sig: Take 1 Tab by mouth daily. sertraline (ZOLOFT) 50 mg tablet 2/19/2019 at Unknown time  Yes Yes   Sig: TAKE 1 TABLET(S) EVERY EVENING BY ORAL ROUTE.100mg   temazepam (RESTORIL) 15 mg capsule 2/19/2019 at Unknown time  Yes Yes   Sig: Take 15 mg by mouth nightly as needed for Sleep. Facility-Administered Medications: None        Allergies:  No Known Allergies     Hospital Course: The patient underwent surgery. Complications:  None; patient tolerated the procedure well. Was taken to the PACU in stable condition and then transferred to the ortho floor. Perioperative Antibiotics:  Ancef     Postoperative Pain Management:  Oxycodone      Postoperative transfusions:    Number of units banked PRBCs =   none     Post Op complications: none    Hemoglobin at discharge:    Lab Results   Component Value Date/Time    HGB 12.6 02/13/2019 08:45 AM    INR 1.0 02/13/2019 08:45 AM       Dressing remained clean, dry and intact. No significant erythema or swelling. Neurovascular exam found to be within normal limits. Wound appears to be healing without any evidence of infection. Pt had a HVAC drain that was removed on POD# 1. Physical Therapy started following surgery and participated in bed mobility, transfers and ambulation. Discharged to: Home with Home Health. Condition on Discharge:   stable    Discharge instructions:    - Take pain medications as prescribed  - Resume pre hospital diet      - Discharge activity: activity as tolerated  - Ambulate as tolerated  - Wound Care Keep wound clean and dry. See discharge instruction sheet. -DISCHARGE MEDICATION LIST     Current Discharge Medication List      START taking these medications    Details   acetaminophen (TYLENOL) 325 mg tablet Take 2 Tabs by mouth every six (6) hours for 14 days.   Qty: 112 Tab, Refills: 0      oxyCODONE IR (ROXICODONE) 5 mg immediate release tablet Take 1-2 Tabs by mouth every four (4) hours as needed. Max Daily Amount: 60 mg.  Qty: 35 Tab, Refills: 0    Associated Diagnoses: S/P cervical spinal fusion      polyethylene glycol (MIRALAX) 17 gram packet Take 1 Packet by mouth daily as needed (constipation) for up to 15 days. Qty: 15 Packet, Refills: 0      senna-docusate (PERICOLACE) 8.6-50 mg per tablet Take 1 Tab by mouth daily. Qty: 30 Tab, Refills: 0         CONTINUE these medications which have NOT CHANGED    Details   diclofenac EC (VOLTAREN) 75 mg EC tablet TAKE 1 TABLET BY MOUTH TWICE A DAY  Qty: 180 Tab, Refills: 3    Associated Diagnoses: Arthritis      atorvastatin (LIPITOR) 10 mg tablet Take 1 Tab by mouth daily. Qty: 90 Tab, Refills: 3      levothyroxine (SYNTHROID) 125 mcg tablet TAKE 1 TABLET BY MOUTH EVERY DAY  Qty: 90 Tab, Refills: 3    Associated Diagnoses: Acquired hypothyroidism      multivitamin (ONE A DAY) tablet Take 1 Tab by mouth daily. sertraline (ZOLOFT) 50 mg tablet TAKE 1 TABLET(S) EVERY EVENING BY ORAL ROUTE.100mg  Refills: 1      alendronate (FOSAMAX) 70 mg tablet Take 70 mg by mouth. STOP taking these medications       temazepam (RESTORIL) 15 mg capsule Comments:   Reason for Stopping:            per medical continuation form      -Follow up in office in 2 weeks      Signed:  Melissa Anderson.  Lanie Lowry, ACNP-BC, ONP-C  Orthopaedic Nurse Practitioner    2/21/2019  10:58 AM [Consultation] : a consultation regarding [FreeTextEntry2] : systolic HF

## 2021-02-16 NOTE — ASSESSMENT
[FreeTextEntry1] : she is in acute on chronic systolic HF start entresto 24/26 bid stop enalapril\par stop digoxin and imdur\par unclear why she is on ivrabadine she can stay on that for now continue coreg\par check labs \par i will investigate why she is on eliquis\par fu in one week virtually

## 2021-02-16 NOTE — PHYSICAL EXAM
[General Appearance - Well Developed] : well developed [Normal Appearance] : normal appearance [Well Groomed] : well groomed [General Appearance - Well Nourished] : well nourished [No Deformities] : no deformities [General Appearance - In No Acute Distress] : no acute distress [Normal Conjunctiva] : the conjunctiva exhibited no abnormalities [Eyelids - No Xanthelasma] : the eyelids demonstrated no xanthelasmas [Normal Oral Mucosa] : normal oral mucosa [No Oral Pallor] : no oral pallor [No Oral Cyanosis] : no oral cyanosis [Normal Jugular Venous A Waves Present] : normal jugular venous A waves present [Normal Jugular Venous V Waves Present] : normal jugular venous V waves present [No Jugular Venous Leija A Waves] : no jugular venous leija A waves [Heart Rate And Rhythm] : heart rate and rhythm were normal [Heart Sounds] : normal S1 and S2 [Murmurs] : no murmurs present [Respiration, Rhythm And Depth] : normal respiratory rhythm and effort [Exaggerated Use Of Accessory Muscles For Inspiration] : no accessory muscle use [Auscultation Breath Sounds / Voice Sounds] : lungs were clear to auscultation bilaterally [Abdomen Soft] : soft [Abdomen Tenderness] : non-tender [Abdomen Mass (___ Cm)] : no abdominal mass palpated [Abnormal Walk] : normal gait [Gait - Sufficient For Exercise Testing] : the gait was sufficient for exercise testing [Nail Clubbing] : no clubbing of the fingernails [Cyanosis, Localized] : no localized cyanosis [Petechial Hemorrhages (___cm)] : no petechial hemorrhages [Skin Color & Pigmentation] : normal skin color and pigmentation [] : no rash [No Venous Stasis] : no venous stasis [Skin Lesions] : no skin lesions [No Skin Ulcers] : no skin ulcer [No Xanthoma] : no  xanthoma was observed [Oriented To Time, Place, And Person] : oriented to person, place, and time [Affect] : the affect was normal [Mood] : the mood was normal [No Anxiety] : not feeling anxious [FreeTextEntry1] : bilateral leg edema

## 2021-03-09 PROBLEM — Z23 NEED FOR COVID-19 VACCINE: Status: ACTIVE | Noted: 2021-01-01

## 2021-03-15 NOTE — REVIEW OF SYSTEMS
[Hearing Loss] : hearing loss [Cough] : no cough [Joint Pain] : joint pain [Joint Stiffness] : joint stiffness [Joint Swelling] : joint swelling [Muscle Pain] : muscle pain [Negative] : Heme/Lymph [FreeTextEntry2] : Dementia [FreeTextEntry5] : Hypertension, LBBB [FreeTextEntry9] : Refusing to walk.  Want to stay in bed.

## 2021-03-15 NOTE — PHYSICAL EXAM
[No Acute Distress] : no acute distress [Well Nourished] : well nourished [Well Developed] : well developed [Normal Sclera/Conjunctiva] : normal sclera/conjunctiva [EOMI] : extra ocular movement intact [Normal Outer Ear/Nose] : the ears and nose were normal in appearance [Normal Oropharynx] : the oropharynx was normal [No JVD] : no jugular venous distention [No Respiratory Distress] : no respiratory distress [Clear to Auscultation] : lungs were clear to auscultation bilaterally [No Accessory Muscle Use] : no accessory muscle use [Normal Rate] : heart rate was normal  [Regular Rhythm] : with a regular rhythm [Normal S1, S2] : normal S1 and S2 [No Murmurs] : no murmurs heard [No Edema] : there was no peripheral edema [Normal Bowel Sounds] : normal bowel sounds [Non Tender] : non-tender [Soft] : abdomen soft [Not Distended] : not distended [Normal Post Cervical Nodes] : no posterior cervical lymphadenopathy [Normal Anterior Cervical Nodes] : no anterior cervical lymphadenopathy [No CVA Tenderness] : no ~M costovertebral angle tenderness [No Spinal Tenderness] : no spinal tenderness [Normal Strength/Tone] : muscle strength and tone were normal [No Rash] : no rash [Oriented x3] : oriented to person, place, and time [Normal Affect] : the affect was normal [de-identified] : Lithuanian speaking.  Verbalizing needs.  No evidence of labored breathing or distress.  [de-identified] : Refusing to ambulate.  Denies pain.  Just does not want to walk.

## 2021-03-15 NOTE — HISTORY OF PRESENT ILLNESS
[Family Member] : family member [Formal Caregiver] : formal caregiver [FreeTextEntry1] : HTN, HLD, HFrEF (EF 25% from 02/2019), LBBB, pre-diabetes, RLS  [FreeTextEntry2] : EAMON WISE is an 89 year with HTN, HLD, HFrEF (EF 25% from 02/2019), LBBB, pre-diabetes, RLS        \par Follow up on chronic medical issuest with HCP Pebbles.  Reports patient is refusing to ambulate.  Denied pain.  No skin breakdown.  No fever or chills.  Requires HHA service.  Severe arthritis in the knee limited mobility. Vania reported patient is not breathing well and she thinks its her CHF.   Seen by Dr. Au in 2/16/21\par \par Patient denies fever, cough, trouble breathing, rash, vomiting and diarrhea. Patient has not been in close contact with someone covid positive.\par \par N95 mask, gloves, eye wear and gown used during visit: [Y]. Total face to face time with patient is 40 min.\par \par Working with family to establish immunization history\par \par \par Patient/ patient's caregiver reports no weight loss >10lbs in the past 6 months. No changes in dentition or swallowing reported, No changes in hearing or vision reported. No changes in Cognition reported. Patient denies any symptoms of depression or anxiety. Patient is ADL and IADL dependent. No changes in gait or falls reported. \par Patient's home environment is safe. \par Goals of care discussed. MOLST completed. \par \par \par

## 2021-03-17 PROBLEM — R19.06 ABDOMINAL WALL MASS OF EPIGASTRIC REGION: Status: ACTIVE | Noted: 2021-01-01

## 2021-03-17 NOTE — HISTORY OF PRESENT ILLNESS
[Family Member] : family member [Formal Caregiver] : formal caregiver [FreeTextEntry1] : HTN, HLD, HFrEF (EF 25% from 02/2019), LBBB, pre-diabetes, RLS  [FreeTextEntry2] : EAMON WISE is an 89 year with HTN, HLD, HFrEF (EF 25% from 02/2019), LBBB, pre-diabetes, RLS        \par Follow up on chronic medical issuest with HCP Pebbles.  Reports patient is refusing to ambulate.  Denied pain.  No skin breakdown.  No fever or chills.  Requires HHA service.  Severe arthritis in the knee limited mobility.  Soft palpable mass in the epigastric area noted. No pain\par \par Patient denies fever, cough, trouble breathing, rash, vomiting and diarrhea. Patient has not been in close contact with someone covid positive.\par \par N95 mask, gloves, eye wear and gown used during visit: [Y]. Total face to face time with patient is 40 min.\par \par Working with family to establish immunization history\par \par \par Patient/ patient's caregiver reports no weight loss >10lbs in the past 6 months. No changes in dentition or swallowing reported, No changes in hearing or vision reported. No changes in Cognition reported. Patient denies any symptoms of depression or anxiety. Patient is ADL and IADL dependent. No changes in gait or falls reported. \par Patient's home environment is safe. \par Goals of care discussed. MOLST completed. \par \par \par

## 2021-03-17 NOTE — COUNSELING
[Normal Weight - ( BMI  <25 )] : normal weight - ( BMI  <25 ) [Hypertension self management education material provided] : hypertension self management education material provided [Non - Smoker] : non-smoker [Use assistive device to avoid falls] : use assistive device to avoid falls [] : foot exam [Patient not on disease-modifying anti-rheumatic drug due to overall prognosis] : Patient not on disease-modifying anti-rheumatic drug due to overall prognosis [Decrease stress] : decrease stress [Decrease hospital use] : decrease hospital use [Discussed disease trajectory with patient/caregiver] : discussed disease trajectory with patient/caregiver [Patient/Caregiver not ready to engage in discussion] : patient/caregiver not ready to engage in discussion [Full Code] : Code Status: Full Code [No Limitations] : Treatment Guidelines: No limitations [Long Term Intubation] : Intubation: Long term intubation [[facility]: _____] [facility] Completion/last verification date: [unfilled] [FreeTextEntry4] : Educated patient/legal representative about CCM services and consent.\par Educated patient/legal representative that this service is available because they have 2 or more chronic conditions, that only one Provider can furnish CCM Services per calendar month and that CCM services are subject to the usual Medicare deductible and coinsurance. \par Patient/legal representative understands the right to stop the CCM services at any time by notifying House Calls office.\par Patient/legal representative has verbally consented 10/2020\par \par

## 2021-03-17 NOTE — PHYSICAL EXAM
[No Acute Distress] : no acute distress [Well Nourished] : well nourished [Well Developed] : well developed [Normal Sclera/Conjunctiva] : normal sclera/conjunctiva [EOMI] : extra ocular movement intact [Normal Outer Ear/Nose] : the ears and nose were normal in appearance [Normal Oropharynx] : the oropharynx was normal [No JVD] : no jugular venous distention [No Respiratory Distress] : no respiratory distress [Clear to Auscultation] : lungs were clear to auscultation bilaterally [No Accessory Muscle Use] : no accessory muscle use [Normal Rate] : heart rate was normal  [Regular Rhythm] : with a regular rhythm [Normal S1, S2] : normal S1 and S2 [No Murmurs] : no murmurs heard [No Edema] : there was no peripheral edema [Normal Bowel Sounds] : normal bowel sounds [Non Tender] : non-tender [Soft] : abdomen soft [Not Distended] : not distended [Normal Post Cervical Nodes] : no posterior cervical lymphadenopathy [Normal Anterior Cervical Nodes] : no anterior cervical lymphadenopathy [No CVA Tenderness] : no ~M costovertebral angle tenderness [No Spinal Tenderness] : no spinal tenderness [Normal Strength/Tone] : muscle strength and tone were normal [No Rash] : no rash [Oriented x3] : oriented to person, place, and time [Normal Affect] : the affect was normal [de-identified] : Yi speaking.  Verbalizing needs.  No evidence of labored breathing or distress.  [de-identified] : Soft, palpable mass in the epigastric area note.  [de-identified] : Refusing to ambulate.  Denies pain.  Just does not want to walk.

## 2021-03-22 PROBLEM — K59.09 CHRONIC CONSTIPATION: Status: ACTIVE | Noted: 2021-01-01

## 2021-04-22 PROBLEM — E03.9 ADULT HYPOTHYROIDISM: Status: ACTIVE | Noted: 2020-03-05

## 2021-04-22 PROBLEM — A41.9 SEVERE SEPSIS WITHOUT SEPTIC SHOCK: Status: ACTIVE | Noted: 2020-03-04

## 2021-04-22 NOTE — COUNSELING
[Normal Weight - ( BMI  <25 )] : normal weight - ( BMI  <25 ) [Hypertension self management education material provided] : hypertension self management education material provided [Non - Smoker] : non-smoker [Use assistive device to avoid falls] : use assistive device to avoid falls [] : foot exam [Patient not on disease-modifying anti-rheumatic drug due to overall prognosis] : Patient not on disease-modifying anti-rheumatic drug due to overall prognosis [Decrease stress] : decrease stress [Decrease hospital use] : decrease hospital use [Discussed disease trajectory with patient/caregiver] : discussed disease trajectory with patient/caregiver [Patient/Caregiver not ready to engage in discussion] : patient/caregiver not ready to engage in discussion [Full Code] : Code Status: Full Code [No Limitations] : Treatment Guidelines: No limitations [Long Term Intubation] : Intubation: Long term intubation [[facility]: _____[facility]/last verification date: [unfilled] [FreeTextEntry4] : Educated patient/legal representative about CCM services and consent.\par Educated patient/legal representative that this service is available because they have 2 or more chronic conditions, that only one Provider can furnish CCM Services per calendar month and that CCM services are subject to the usual Medicare deductible and coinsurance. \par Patient/legal representative understands the right to stop the CCM services at any time by notifying House Calls office.\par Patient/legal representative has verbally consented 10/2020\par \par

## 2021-04-22 NOTE — REASON FOR VISIT
[Initial Evaluation] : an initial evaluation [Family Member] : family member [Formal Caregiver] : formal caregiver [Pre-Visit Preparation] : pre-visit preparation was done [Intercurrent Specialty/Sub-specialty Visits] : the patient has intercurrent specialty/sub-specialty visits [FreeTextEntry1] : HTN, HLD, HFrEF (EF 25% from 02/2019), LBBB, pre-diabetes, RLS  [FreeTextEntry3] : LIZZIE [FreeTextEntry2] : nephrology

## 2021-04-22 NOTE — HISTORY OF PRESENT ILLNESS
[Family Member] : family member [Formal Caregiver] : formal caregiver [FreeTextEntry1] : HTN, HLD, HFrEF (EF 25% from 02/2019), LBBB, pre-diabetes, RLS  [FreeTextEntry2] : EAMON WISE is being seen for a visit provided via telehealth via [Face Time/Judy].  This visit was first attempted using [employer] telehealth real-time audio visual technology, but was unable to be completed.\par EAMON WISE was located at their home, [address]\[place] [address], at the time of the visit.\par The house calls clinician, MAYUR AIKEN, was located remotely at their home in [place] at the time of the visit.\par The patient,  EAMON WISE, and the house calls clinician,  MAYUR AIKEN, participated in the telehealth encounter.\par Other participants included: []\par [Fn Ln (Sep 13 1931) or his/her representative consents to the use of telehealth.  All questions related to telehealth answered.\par \par \par EAMON WISE is an 89 year with HTN, HLD, HFrEF (EF 25% from 02/2019), LBBB, pre-diabetes, RLS        \par Follow up on chronic medical issuest with HCP Pebbles.  Reports patient is refusing to ambulate.  Denied pain.  No skin breakdown.  No fever or chills.  Requires HHA service.  Severe arthritis in the knee limited mobility.  Soft palpable mass in the epigastric area noted. No pain\par \par Patient denies fever, cough, trouble breathing, rash, vomiting and diarrhea. Patient has not been in close contact with someone covid positive.\par \par N95 mask, gloves, eye wear and gown used during visit: [Y]. Total face to face time with patient is 40 min.\par \par Working with family to establish immunization history\par \par \par Patient/ patient's caregiver reports no weight loss >10lbs in the past 6 months. No changes in dentition or swallowing reported, No changes in hearing or vision reported. No changes in Cognition reported. Patient denies any symptoms of depression or anxiety. Patient is ADL and IADL dependent. No changes in gait or falls reported. \par Patient's home environment is safe. \par Goals of care discussed. MOLST completed. \par \par \par

## 2021-04-22 NOTE — PHYSICAL EXAM
[No Acute Distress] : no acute distress [Well Nourished] : well nourished [Well Developed] : well developed [Normal Sclera/Conjunctiva] : normal sclera/conjunctiva [EOMI] : extra ocular movement intact [Normal Outer Ear/Nose] : the ears and nose were normal in appearance [Normal Oropharynx] : the oropharynx was normal [No JVD] : no jugular venous distention [No Respiratory Distress] : no respiratory distress [Clear to Auscultation] : lungs were clear to auscultation bilaterally [No Accessory Muscle Use] : no accessory muscle use [Normal Rate] : heart rate was normal  [Regular Rhythm] : with a regular rhythm [Normal S1, S2] : normal S1 and S2 [No Murmurs] : no murmurs heard [No Edema] : there was no peripheral edema [Normal Bowel Sounds] : normal bowel sounds [Non Tender] : non-tender [Soft] : abdomen soft [Not Distended] : not distended [Normal Post Cervical Nodes] : no posterior cervical lymphadenopathy [Normal Anterior Cervical Nodes] : no anterior cervical lymphadenopathy [No CVA Tenderness] : no ~M costovertebral angle tenderness [No Spinal Tenderness] : no spinal tenderness [Normal Strength/Tone] : muscle strength and tone were normal [No Rash] : no rash [Oriented x3] : oriented to person, place, and time [Normal Affect] : the affect was normal [de-identified] : Turkmen speaking.  Verbalizing needs.  No evidence of labored breathing or distress.  [de-identified] : Soft, palpable mass in the epigastric area note.  [de-identified] : Refusing to ambulate.  Denies pain.  Just does not want to walk.

## 2021-05-04 NOTE — H&P ADULT - PROBLEM SELECTOR PLAN 5
Hgb 9.0 on arrival (baseline 9s-10s over last year in [facility]). Denies s/s of bleeding  - C/w home Ferrous Sulfate 325mg QD  - F/u Iron studies  - Maintain active T/S (ordered)

## 2021-05-04 NOTE — H&P ADULT - NSHPLABSRESULTS_GEN_ALL_CORE
9.0    4.18  )-----------( 169      ( 04 May 2021 16:56 )             30.0     PT/INR - ( 04 May 2021 16:56 )   PT: 25.5 sec;   INR: 2.21          PTT - ( 04 May 2021 16:56 )  PTT:35.7 sec    05-04    141  |  103  |  30<H>  ----------------------------<  92  4.2   |  30  |  1.21    Ca    8.5      04 May 2021 16:56  Mg     2.2     05-04    TPro  7.2  /  Alb  3.3  /  TBili  0.5  /  DBili  x   /  AST  47<H>  /  ALT  19  /  AlkPhos  131<H>  05-04    CARDIAC MARKERS ( 04 May 2021 16:56 )  x     / 0.04 ng/mL / x     / x     / x

## 2021-05-04 NOTE — H&P ADULT - PROBLEM SELECTOR PLAN 3
UA+ for small leuks, >10WBC, few yeast; UCx pending f/u results  - Started Ceftriaxone 1g QD (d/c on 5/8)

## 2021-05-04 NOTE — H&P ADULT - ASSESSMENT
89 y/o bedbound, Japanese speaking Female, DNR/DNI (Advanced Directives in All[employer] and confirmed w/ HCP Pebbles Kan), with PMHx of Dementia (A&Ox2), HTN, HLD, Pre-DM, Chronic Systolic CHF (EF 20% by Echo on 1/30/21), Severe TR, Moderate-Severe AR, known LBBB, CKD Stage 3 (baseline Cr 1-1.3), Anemia (baseline Hgb 9s-10s), Asthma, Hypothyroidism, Depression, who presented to [facility] ED on 5/4/21 after syncopal episode witness by HHA. Pt was sitting on bed when HHA states pt looked tense with unilateral fist clenching followed by LOC for few minutes. Pt had no tongue biting, bowel/bladder incontinence, or any other seizure like symptoms. Pt quickly returned to baseline. Pt also c/o LE edema, SOB, orthopnea, and non-productive cough progressively worsening for the last couple of weeks. Pt is now admitted to cardiac telemetry for further management of acute on chronic systolic CHF exacerbation.

## 2021-05-04 NOTE — H&P ADULT - PROBLEM SELECTOR PLAN 7
Currently satting well on 2L NC (not on home O2). Audible wheezing on exam, but appears comfortable, NAD  - Wean off O2 as tolerated. Ordered incentive spirometry  - Ordered Albuterol Q6HR PRN and Spiriva QD

## 2021-05-04 NOTE — H&P ADULT - HISTORY OF PRESENT ILLNESS
89 y/o bedbound, Mongolian speaking Female, DNR/DNI (Advanced Directives in Allscripts and confirmed w/ HCP Pebbles Kan), with PMHx of Dementia (A&Ox2), HTN, HLD, Pre-DM, Chronic Systolic CHF (EF 20% by Echo on 1/30/21), Severe TR, Moderate-Severe AR, known LBBB, CKD Stage 3 (baseline Cr 1-1.3), Anemia (baseline Hgb 9s-10s), Asthma, Hypothyroidism, Depression, who presented to [facility] ED on 5/4/21 after syncopal episode witness by HHA. Pt was sitting on bed when HHA states pt looked tense with unilateral fist clenching followed by LOC for few minutes. Pt had no tongue biting, bowel/bladder incontinence, or any other seizure like symptoms. Pt quickly returned to baseline. Pt also c/o LE edema, SOB, orthopnea, and non-productive cough progressively worsening for the last couple of weeks. Her home Lasix was increased from 20 to 40mg QD, but with minimal improvement in her symptoms. Pt denies fevers, chills, cough, CP, palpitations, HA, dizziness, PND, n/v/d, abdominal pain, melena, BRBPR, or any other symptoms at this time.  On arrival to ED, VS noted as T 97.8, HR 94, /80, RR 18, SpO2 94% on RA. Labs significant for Trop 0.04, BNP 70221, Hgb 9.0, Cr 1.21, INR 2.21, , AST 47, UA +small leuks, >10WBC, few yeast. CXR wet read c/w pulmonary congestion and cardiomegaly, EKG SR w/ LBBB, CTH negative for acute findings.  In ED, pt given Lasix 20mg IV x 1, Lasix 40mg IV x 1. Pt is now admitted to cardiac telemetry for further management of acute on chronic systolic CHF exacerbation.

## 2021-05-04 NOTE — H&P ADULT - PROBLEM SELECTOR PLAN 2
Pt does not recall event, but as per HHA was sitting on bed and appeared tense followed by LOC for few minutes w/ subsequent return to baseline  - CTH negative for acute findings  - Monitor telemetry

## 2021-05-04 NOTE — ED PROVIDER NOTE - ATTENDING CONTRIBUTION TO CARE
89 yo , ho of dementia, HTN, HLD, CAD  brought in after syncope,   while on cough, + LOC, no incontinence, no seizure like activity , able to wake up after a few min  recent increased edema to legs, sees Dr. Au  crackles to bilateral bases, no distress or hypoxia, + 2 pitting edema,   + BNP, mild troponemia   plan to admit to cardiology for CHF exacerbation  CT head to r/o ICH , low suspician

## 2021-05-04 NOTE — ED PROVIDER NOTE - CLINICAL SUMMARY MEDICAL DECISION MAKING FREE TEXT BOX
pt passed out earlier today - was sitting on couch and fell over - did not hit the floor, no signs of trauma, woke up a few min after and is back to baseline, is demented at baseline, no sz activity or bowel / bladder incontinence, no tongue lac, ct head done, pt appears to be fluid overloaded - crackles and pitting edema, given iv lasix - chou placed and diuresing well, hx of chf(25%)/htn/hld/ckd, will admit to cards tele for diuresis and monitoring

## 2021-05-04 NOTE — ED ADULT NURSE NOTE - OBJECTIVE STATEMENT
Patient arrives via EMS from home for eval of possible syncopal episode.  Niece relates patient was sitting on the bed and "fell back" witnessed by home health aide and it took a few minutes for patient to return to baseline.  Upon arrival to ED patient is awake and alert, primarily East Timorese speaking, hx dementia, bedbound per baseline.  Hemanth lower ext swelling noted, breathing labored.  Niece reports patient did report chest pain earlier today.  STAT EKG completed upon arrival, cardiac monitor applied, 3L O2 provided via nasal cannula.  Rectal temp 97.4, patient found to be incontinent of urine, eliezer care completed.  Skin at sacrum reddened/thin, no obvious open areas.

## 2021-05-04 NOTE — H&P ADULT - PROBLEM SELECTOR PLAN 10
A&O to person, place (hospital), and says 2020 for year. Pt takes sometime to adjust when awoken from sleep. Exam also notable chronic mild dysarthria, RLE weakness attributed to severe knee osteoarthritis per HCP. No other focal deficits appreciated  - C/w home Venlafaxine 75mg QD    VTE: Eliquis  Dispo: Pending clinical progression  Advanced Care Planning completed on 3/5/21 in Allscripts by NP Rufus Diaz: Pt is DNR/DNI, comfort measures only, hospitalize as needed, no feeding tubes, trial of IVF, use antibiotics, and preferred place of death is home. HCP Pebbles Kan in agreement w/ current treatment plan and confirms pt's advanced care planning as described above  Case d/w Dr Dumont

## 2021-05-04 NOTE — ED ADULT NURSE NOTE - NSIMPLEMENTINTERV_GEN_ALL_ED
Implemented All Fall with Harm Risk Interventions:  [place] to call system. Call bell, personal items and telephone within reach. Instruct patient to call for assistance. Room bathroom lighting operational. Non-slip footwear when patient is off stretcher. Physically safe environment: no spills, clutter or unnecessary equipment. Stretcher in lowest position, wheels locked, appropriate side rails in place. Provide visual cue, wrist band, yellow gown, etc. Monitor gait and stability. Monitor for mental status changes and reorient to person, place, and time. Review medications for side effects contributing to fall risk. Reinforce activity limits and safety measures with patient and family. Provide visual clues: red socks.

## 2021-05-04 NOTE — H&P ADULT - PROBLEM SELECTOR PLAN 1
+JVD, bibasilar rales/rhonchi/wheezing, 3+ pitting edema to b/l knees. BNP 74437, CXR wet read w/ pulmonary congestion and cardiomegaly (f/u official report). Trop 0.04 likely 2/2 demand ischemia from CHF exacerbation  - Echo in [employer] from 1/2021: EF 20%, mild concentric LVH, dilated LV, global hypokinesis, severe biatrial enlargement, severe TR, moderate-severe AR, moderate MR/MAC, RVSP 56, moderate pHTN  - Echo ordered, f/u results  - s/p Lasix 20mg IV x 1 and Lasix 40mg IV x 1 in ED w/ negative 595mL since admission measured via Rae Catheter placed in ED. C/w Lasix 40mg IV BID and consider TOV on 5/5/21. Pt incontinent and will need Primafit for I/Os  - C/w home Entresto 49/51mg BID and Coreg 25mg BID  - Trend Trops  - Core measures, strict I/Os, daily weights, 1L fluid restriction

## 2021-05-04 NOTE — ED ADULT TRIAGE NOTE - CHIEF COMPLAINT QUOTE
Per EMS "The family said she was sitting on the edge of the bed and fell back on it and it took her a few minutes to come to."

## 2021-05-04 NOTE — ED PROVIDER NOTE - OBJECTIVE STATEMENT
The pt is a 88 y/o F, BIBA, w/family at bedside, for syncope PTA. Pt was sitting on bed and according to HHA, pt fell over on her side and was unresponsive x few min, pt looked "tense", but no shaking or foaming at mouth, then woke up and was at her baseline. PMH HTN, HLD, CHF (last EF25%), CKD, dementia. According to family, increased leg swelling and unable to lay down flat, pt not ambulatory at baseline. No cough, no fevers, no emesis, no diarrhea, no falls/injuries

## 2021-05-04 NOTE — H&P ADULT - PROBLEM SELECTOR PLAN 6
Cr 1.21 on arrival (baseline 1-1.3 in HIE)  - Monitor UOP and daily BMP  - Renally dose meds and avoid nephrotoxic agents

## 2021-05-04 NOTE — PATIENT PROFILE ADULT - NSASFALLNEEDSASSISTWITH_GEN_A_NUR
Breath Sounds equal & clear to percussion & auscultation, no accessory muscle use standing/walking/toileting

## 2021-05-04 NOTE — H&P ADULT - PROBLEM SELECTOR PLAN 4
No documented history of AFib in [employer] and EKG on arrival w/ SR and LBBB (reviewed w/ Dr Dumont), but pt on Eliquis 5mg BID. HCP also denies h/o DVT or PE  - Call Rufus Diaz NP at [phone] in AM for collateral information and indication for Eliquis. Will c/w Eliquis 5mg BID at this time pending further clarification   - C/w home Coreg 25mg BID  - Monitor telemetry

## 2021-05-05 NOTE — PROGRESS NOTE ADULT - PROBLEM SELECTOR PLAN 4
NSR; no documented hx of AFib in [employer] and pt on Eliquis 5mg BID at home, HCP denies h/o DVT or PE  -EKG revealing NSR and LBBB  -Will call Rufus Diaz NP at [phone] for collateral information and indication for Eliquis  -Continue Eliquis 5mg BID and Carvedilol 25mg BID hx of severe TR on outpt Echo  -Prior outpt Echo 1/30/21 revealing mod MR, severe TR (RVSP 56mmHg), mild VT, mod-severe AR  -Repeat Echo 5/5/21 revealed mild-severe TR, mod AR, mild MR  -Consider structural consult

## 2021-05-05 NOTE — PHYSICAL THERAPY INITIAL EVALUATION ADULT - PERTINENT HX OF CURRENT PROBLEM, REHAB EVAL
89F who presented to [facility] ED on 5/4/21 after syncopal episode witness by HHA. Pt was sitting on bed when HHA states pt looked tense with unilateral fist clenching followed by LOC for few minutes. Pt had no tongue biting, bowel/bladder incontinence, or any other seizure like symptoms. Pt quickly returned to baseline

## 2021-05-05 NOTE — DIETITIAN INITIAL EVALUATION ADULT. - PROBLEM SELECTOR PLAN 4
No documented history of AFib in [employer] and EKG on arrival w/ SR and LBBB (reviewed w/ Dr Dumont), but pt on Eliquis 5mg BID. HCP also denies h/o DVT or PE  - Call Rufus Diaz NP at [phone] in AM for collateral information and indication for Eliquis. Will c/w Eliquis 5mg BID at this time pending further clarification   - C/w home Coreg 25mg BID  - Monitor telemetry

## 2021-05-05 NOTE — CONSULT NOTE ADULT - ATTENDING COMMENTS
Pt seen at bedside  Agree with above  pt with known hx of hypothyroidism on levothyroxine at home 100mcg daily, now with elevated TSH of 19 in setting of normal FT4 and low TT3.    Would recommend increase dose to 112mcg daily of levothyroxine, can repeat TFTs in 4 weeks.   LDL at goal, continue atorvastatin 40mg daily  Thank you for allowing us to participate in the care of this patient.  Please reconsult as needed.

## 2021-05-05 NOTE — PROGRESS NOTE ADULT - ASSESSMENT
89yo Vietnamese speaking Female, DNR/DNI, bedbound, with PMHx of Dementia (A&O x2), HTN, HLD, Pre-DM, Chronic Systolic CHF (EF 20%), Severe TR, Moderate-Severe AR, known LBBB, CKD Stage 3 (baseline Cr 1-1.3), Anemia (baseline Hgb 9s-10s), Asthma, Hypothyroidism and Depression, who presented to [facility] ED on 5/4/21 after witnessed syncopal episode and c/o LE edema, SOB/orthopnea, and non-productive cough, now admitted to cardiac telemetry for further management of acute on chronic systolic CHF exacerbation as well as UTI on IV abx. Pt also found to have elevated TSH, Endocrinology consulted. 88yo Czech speaking Female, DNR/DNI, bedbound, with PMHx of Dementia (A&O x2), HTN, HLD, Pre-DM, Chronic Systolic CHF (EF 15-20%), mod-severe TR, known LBBB, CKD Stage 3 (baseline Cr 1-1.3), Anemia (baseline Hgb 9s-10s), Asthma, Hypothyroidism and Depression, who presented to [facility] ED on 5/4/21 after witnessed syncopal episode and c/o LE edema, SOB/orthopnea, and non-productive cough, now admitted to cardiac telemetry for further management of acute on chronic systolic CHF exacerbation as well as UTI on IV abx. Pt also found to have elevated TSH, Endocrinology consulted. 89yo Georgian speaking Female, DNR/DNI, bedbound, with PMHx of Dementia (A&O x2), HTN, HLD, Pre-DM, Chronic Systolic CHF (EF 15-20%), severe TR, known LBBB, CKD Stage 3 (baseline Cr 1-1.3), Anemia (baseline Hgb 9s-10s), Asthma, Hypothyroidism and Depression, who presented to [facility] ED on 5/4/21 after witnessed syncopal episode and c/o LE edema, SOB/orthopnea, and non-productive cough, now admitted to cardiac telemetry for further management of acute on chronic systolic CHF exacerbation as well as UTI on IV abx. Pt also found to have elevated TSH, Endocrinology consulted.

## 2021-05-05 NOTE — DIETITIAN INITIAL EVALUATION ADULT. - DIET TYPE
Low sodium diet, ensure enlive x2/day 350kcal/20gm prot per 1 can; defer PO cons to Team/SLP - suggest SLP Eval PRN

## 2021-05-05 NOTE — PROGRESS NOTE ADULT - SUBJECTIVE AND OBJECTIVE BOX
Cardiology PA Adult Progress Note    Subjective Assessment:  	  MEDICATIONS:  carvedilol 25 milliGRAM(s) Oral every 12 hours  furosemide   Injectable 40 milliGRAM(s) IV Push two times a day  sacubitril 49 mG/valsartan 51 mG 1 Tablet(s) Oral two times a day    cefTRIAXone   IVPB 1000 milliGRAM(s) IV Intermittent every 24 hours  ALBUTerol    90 MICROgram(s) HFA Inhaler 2 Puff(s) Inhalation every 6 hours PRN  tiotropium 18 MICROgram(s) Capsule 1 Capsule(s) Inhalation daily  gabapentin 100 milliGRAM(s) Oral two times a day  venlafaxine 75 milliGRAM(s) Oral daily  senna 2 Tablet(s) Oral at bedtime  atorvastatin 40 milliGRAM(s) Oral at bedtime  levothyroxine 100 MICROGram(s) Oral daily  apixaban 5 milliGRAM(s) Oral two times a day  ferrous    sulfate 325 milliGRAM(s) Oral daily    VITAL SIGNS:  T(C): 37.1 (05-05-21 @ 09:09), Max: 37.1 (05-05-21 @ 09:09)  HR: 61 (05-05-21 @ 08:25) (58 - 94)  BP: 111/54 (05-05-21 @ 08:25) (100/52 - 152/66)  RR: 15 (05-05-21 @ 08:25) (15 - 29)  SpO2: 100% (05-05-21 @ 08:25) (94% - 100%)  Height (cm): 157.5 (05-04 @ 22:42)  Weight (kg): 59.4 (05-04 @ 22:42)  BMI (kg/m2): 23.9 (05-04 @ 22:42)  BSA (m2): 1.6 (05-04 @ 22:42)    I&O's Summary  04 May 2021 07:01  -  05 May 2021 07:00  --------------------------------------------------------  IN: 60 mL / OUT: 2345 mL / NET: -2285 mL    05 May 2021 07:01  -  05 May 2021 09:38  --------------------------------------------------------  IN: 0 mL / OUT: 450 mL / NET: -450 mL                                    PHYSICAL EXAM:  Appearance: Normal	  HEENT:   Normal oral mucosa, PERRL, EOMI	  Neck: Supple, + JVD/ - JVD; Carotid Bruit   Cardiovascular: Normal S1 S2, No JVD, No murmurs,   Respiratory: Lungs clear to auscultation/Decreased Breath Sounds/No Rales, Rhonchi, Wheezing	  Gastrointestinal:  Soft, Non-tender, + BS	  Skin: No rashes, No ecchymoses, No cyanosis  Extremities: Normal range of motion, No clubbing, cyanosis or edema  Vascular: Peripheral pulses palpable 2+ bilaterally  Neurologic: Non-focal  Psychiatry: A & O x 3, Mood & affect appropriate	    LABS:	 	             9.0    3.54  )-----------( 171      ( 05 May 2021 06:37 )             29.6     142  |  105  |  27<H>  ----------------------------<  89  3.8   |  26  |  1.16    Ca    8.5      05 May 2021 06:37  Mg     2.1     05-05    TPro  7.2  /  Alb  3.3  /  TBili  0.5  /  DBili  x   /  AST  47<H>  /  ALT  19  /  AlkPhos  131<H>  05-04  proBNP: Serum Pro-Brain Natriuretic Peptide: 82001 pg/mL (05-04 @ 16:56)  TSH: Thyroid Stimulating Hormone, Serum: 19.540 uIU/mL (05-04 @ 22:53)  PT/INR - ( 05 May 2021 06:37 )   PT: 24.2 sec;   INR: 2.09     PTT - ( 05 May 2021 06:37 )  PTT:37.8 sec   Cardiology PA Adult Progress Note    Subjective Assessment: Pt seen and examined at bedside this AM without any complaints or events overnight. She states that SOB has improved and denies any orthopnea, CP, palpitations, dizziness/lightheadedness or abd pain.  	  MEDICATIONS:  carvedilol 25 milliGRAM(s) Oral every 12 hours  furosemide   Injectable 40 milliGRAM(s) IV Push two times a day  sacubitril 49 mG/valsartan 51 mG 1 Tablet(s) Oral two times a day    cefTRIAXone   IVPB 1000 milliGRAM(s) IV Intermittent every 24 hours  ALBUTerol    90 MICROgram(s) HFA Inhaler 2 Puff(s) Inhalation every 6 hours PRN  tiotropium 18 MICROgram(s) Capsule 1 Capsule(s) Inhalation daily  gabapentin 100 milliGRAM(s) Oral two times a day  venlafaxine 75 milliGRAM(s) Oral daily  senna 2 Tablet(s) Oral at bedtime  atorvastatin 40 milliGRAM(s) Oral at bedtime  levothyroxine 100 MICROGram(s) Oral daily  apixaban 5 milliGRAM(s) Oral two times a day  ferrous    sulfate 325 milliGRAM(s) Oral daily    VITAL SIGNS:  T(C): 37.1 (05-05-21 @ 09:09), Max: 37.1 (05-05-21 @ 09:09)  HR: 61 (05-05-21 @ 08:25) (58 - 94)  BP: 111/54 (05-05-21 @ 08:25) (100/52 - 152/66)  RR: 15 (05-05-21 @ 08:25) (15 - 29)  SpO2: 100% (05-05-21 @ 08:25) (94% - 100%)  Height (cm): 157.5 (05-04 @ 22:42)  Weight (kg): 59.4 (05-04 @ 22:42)  BMI (kg/m2): 23.9 (05-04 @ 22:42)  BSA (m2): 1.6 (05-04 @ 22:42)    I&O's Summary  04 May 2021 07:01  -  05 May 2021 07:00  --------------------------------------------------------  IN: 60 mL / OUT: 2345 mL / NET: -2285 mL    05 May 2021 07:01  -  05 May 2021 09:38  --------------------------------------------------------  IN: 0 mL / OUT: 450 mL / NET: -450 mL                                    PHYSICAL EXAM:  Appearance: Normal	  HEENT: Normal oral mucosa, PERRL, EOMI	  Neck: Supple, - JVD; No Carotid Bruit   Cardiovascular: Normal S1 S2, No JVD, No murmurs,   Respiratory: Lungs clear to auscultation, No Rales, Rhonchi, Wheezing	  Gastrointestinal:  Soft, Non-tender, + BS	  Skin: No rashes, No ecchymoses, No cyanosis  Extremities: Normal range of motion, No clubbing, cyanosis or edema  Vascular: Peripheral pulses palpable 2+ bilaterally  Neurologic: Non-focal  Psychiatry: A & O x 3, Mood & affect appropriate	    LABS:	 	             9.0    3.54  )-----------( 171      ( 05 May 2021 06:37 )             29.6     142  |  105  |  27<H>  ----------------------------<  89  3.8   |  26  |  1.16    Ca    8.5      05 May 2021 06:37  Mg     2.1     05-05    TPro  7.2  /  Alb  3.3  /  TBili  0.5  /  DBili  x   /  AST  47<H>  /  ALT  19  /  AlkPhos  131<H>  05-04  proBNP: Serum Pro-Brain Natriuretic Peptide: 76231 pg/mL (05-04 @ 16:56)  TSH: Thyroid Stimulating Hormone, Serum: 19.540 uIU/mL (05-04 @ 22:53)  PT/INR - ( 05 May 2021 06:37 )   PT: 24.2 sec;   INR: 2.09     PTT - ( 05 May 2021 06:37 )  PTT:37.8 sec   Cardiology PA Adult Progress Note    Subjective Assessment: Pt seen and examined at bedside this AM without any complaints or events overnight. She states that SOB has improved and denies any orthopnea, CP, palpitations, dizziness/lightheadedness or abd pain.  	  MEDICATIONS:  carvedilol 25 milliGRAM(s) Oral every 12 hours  furosemide   Injectable 40 milliGRAM(s) IV Push two times a day  sacubitril 49 mG/valsartan 51 mG 1 Tablet(s) Oral two times a day    cefTRIAXone   IVPB 1000 milliGRAM(s) IV Intermittent every 24 hours  ALBUTerol    90 MICROgram(s) HFA Inhaler 2 Puff(s) Inhalation every 6 hours PRN  tiotropium 18 MICROgram(s) Capsule 1 Capsule(s) Inhalation daily  gabapentin 100 milliGRAM(s) Oral two times a day  venlafaxine 75 milliGRAM(s) Oral daily  senna 2 Tablet(s) Oral at bedtime  atorvastatin 40 milliGRAM(s) Oral at bedtime  levothyroxine 100 MICROGram(s) Oral daily  apixaban 5 milliGRAM(s) Oral two times a day  ferrous    sulfate 325 milliGRAM(s) Oral daily    VITAL SIGNS:  T(C): 37.1 (05-05-21 @ 09:09), Max: 37.1 (05-05-21 @ 09:09)  HR: 61 (05-05-21 @ 08:25) (58 - 94)  BP: 111/54 (05-05-21 @ 08:25) (100/52 - 152/66)  RR: 15 (05-05-21 @ 08:25) (15 - 29)  SpO2: 100% (05-05-21 @ 08:25) (94% - 100%)  Height (cm): 157.5 (05-04 @ 22:42)  Weight (kg): 59.4 (05-04 @ 22:42)  BMI (kg/m2): 23.9 (05-04 @ 22:42)  BSA (m2): 1.6 (05-04 @ 22:42)    I&O's Summary  04 May 2021 07:01  -  05 May 2021 07:00  --------------------------------------------------------  IN: 60 mL / OUT: 2345 mL / NET: -2285 mL    05 May 2021 07:01  -  05 May 2021 09:38  --------------------------------------------------------  IN: 0 mL / OUT: 450 mL / NET: -450 mL                                    PHYSICAL EXAM:  Appearance: Normal	  HEENT: Normal oral mucosa, PERRL, EOMI	  Neck: Supple, - JVD; No Carotid Bruit   Cardiovascular: Normal S1 S2, No JVD, No murmurs,   Respiratory: bibasilar crackles	  Gastrointestinal:  Soft, Non-tender, + BS	  Skin: No rashes, No ecchymoses, No cyanosis  Extremities: 2+ pitting LE edema B/L  Vascular: Peripheral pulses palpable 2+ bilaterally  Neurologic: Non-focal  Psychiatry: A & O x 3, Mood & affect appropriate	    LABS:	 	             9.0    3.54  )-----------( 171      ( 05 May 2021 06:37 )             29.6     142  |  105  |  27<H>  ----------------------------<  89  3.8   |  26  |  1.16    Ca    8.5      05 May 2021 06:37  Mg     2.1     05-05    TPro  7.2  /  Alb  3.3  /  TBili  0.5  /  DBili  x   /  AST  47<H>  /  ALT  19  /  AlkPhos  131<H>  05-04  proBNP: Serum Pro-Brain Natriuretic Peptide: 83864 pg/mL (05-04 @ 16:56)  TSH: Thyroid Stimulating Hormone, Serum: 19.540 uIU/mL (05-04 @ 22:53)  PT/INR - ( 05 May 2021 06:37 )   PT: 24.2 sec;   INR: 2.09     PTT - ( 05 May 2021 06:37 )  PTT:37.8 sec

## 2021-05-05 NOTE — DIETITIAN INITIAL EVALUATION ADULT. - OTHER INFO
88 y/o bedbound, Female, DNR/DNI PMHx of Dementia (A&Ox2), HTN, HLD, Pre-DM, Chronic Systolic CHF (EF 20% by Echo on 1/30/21), Severe TR, Moderate-Severe AR, known LBBB, CKD Stage 3 (baseline Cr 1-1.3), Anemia (baseline Hgb 9s-10s), Asthma, Hypothyroidism, Depression, who presented to [facility] ED 5/4/21 after syncopal episode witness by HHA. Pt is now admitted to cardiac telemetry for further management of acute on chronic systolic CHF exacerbation (Echo 5/5/21: EF 15-20%) as well as UTI on IV abx. Pt also found to have elevated TSH, Endocrinology consulted.  Pt visited on 5 LA, sleeping during time of RD visit, breakfast anita untouched as result. Upon following up later on, RN reported pt went to ECHO upon waking up and did not want to eat breakfast upon returning to unit from ECHO despite being encouraged. Spoke with HCP via phone. Reports eating well PTA, pt needs soft/cutup foods to provide ease when chewing/swallowing. Denies use of puree however per 1/2020 SLP Eval recs for puree/thins. Does take 1-2ensure/day. NKFA. -147 pounds, HCP feels pt with wt loss during admission 2/2 diuresis; current wt of 130 pounds noted (+Lasix use noted). No pain. Sagar 13. No edema. Stage II R buttocks pressure ulcer.  Please see below for RD Recs, D/w team.

## 2021-05-05 NOTE — CONSULT NOTE ADULT - CONSULT REQUESTED BY NAME
Subjective   Naina Manning is a 66 y.o. female.     Hyperlipidemia   This is a chronic problem. The current episode started more than 1 year ago. The problem is controlled. Recent lipid tests were reviewed and are normal. Exacerbating diseases include hypothyroidism. Current antihyperlipidemic treatment includes diet change and statins. The current treatment provides moderate improvement of lipids. There are no compliance problems.  Risk factors for coronary artery disease include dyslipidemia, post-menopausal and hypertension.       The following portions of the patient's history were reviewed and updated as appropriate: allergies, current medications, past family history, past medical history, past social history, past surgical history and problem list.    Review of Systems   Gastrointestinal:        External thrombotic hemorrhoid   Genitourinary:        Vaginal dryness will need estrogen topically       Objective   Physical Exam   Constitutional: She is oriented to person, place, and time.   Overweight   Cardiovascular: Normal rate and regular rhythm.    Pulmonary/Chest: Effort normal and breath sounds normal.   Genitourinary:   Genitourinary Comments: Rectal exam external thrombotic hemorrhoid 9 o'clock - no rectal masses   Neurological: She is alert and oriented to person, place, and time.   Psychiatric: She has a normal mood and affect. Her behavior is normal. Judgment and thought content normal.   Nursing note and vitals reviewed.      Assessment/Plan   Naina was seen today for follow-up.    Diagnoses and all orders for this visit:    Need for immunization against influenza  -     Fluzone High Dose =>65Years    Mixed hyperlipidemia  -     Comprehensive Metabolic Panel  -     Lipid Panel       Plan topical estrogen, above         
Dr Dumont

## 2021-05-05 NOTE — PROGRESS NOTE ADULT - PROBLEM SELECTOR PLAN 3
TSH 19.540, Free Thyroxine WNL  -Dr. Jenkins consulted  -Continue home Levothyroxine 100mcg QD until further Endocrine recs

## 2021-05-05 NOTE — PROGRESS NOTE ADULT - PROBLEM SELECTOR PLAN 10
A&O x 2 (person and place)  -Continue home Venlafaxine 75mg QD    F: None  E: Replete if K<4 or Mag<2  N: DASH Diet with 1L fluid restriction  VTEppx: Eliquis  Dispo: [facility] vs home PT

## 2021-05-05 NOTE — DIETITIAN INITIAL EVALUATION ADULT. - OTHER CALCULATIONS
current body wt used for energy calculations as pt falls within % IBW; adjusted for age, CHF, pressure ulcers; fluids per team

## 2021-05-05 NOTE — GOALS OF CARE CONVERSATION - ADVANCED CARE PLANNING - CONVERSATION DETAILS
Pt is DNR/DNI, comfort measures only, hospitalize as needed, no feeding tubes, trial of IVF, use antibiotics, and preferred place of death is home. HCP Pebbles Kan in agreement w/ current treatment plan and confirms pt's advanced care planning as described above Pt is DNR/DNI, comfort measures only, hospitalize as needed, no feeding tubes, trial of IVF, use antibiotics, and preferred place of death is home. HCP Pebbles Kan in agreement w/ current treatment plan and confirms pt's advanced care planning as described above.  Advanced Care Planning completed on 3/5/21 in [employer] by NP Rufus Diaz.

## 2021-05-05 NOTE — DIETITIAN INITIAL EVALUATION ADULT. - PROBLEM SELECTOR PLAN 1
+JVD, bibasilar rales/rhonchi/wheezing, 3+ pitting edema to b/l knees. BNP 15710, CXR wet read w/ pulmonary congestion and cardiomegaly (f/u official report). Trop 0.04 likely 2/2 demand ischemia from CHF exacerbation  - Echo in [employer] from 1/2021: EF 20%, mild concentric LVH, dilated LV, global hypokinesis, severe biatrial enlargement, severe TR, moderate-severe AR, moderate MR/MAC, RVSP 56, moderate pHTN  - Echo ordered, f/u results  - s/p Lasix 20mg IV x 1 and Lasix 40mg IV x 1 in ED w/ negative 595mL since admission measured via Rae Catheter placed in ED. C/w Lasix 40mg IV BID and consider TOV on 5/5/21. Pt incontinent and will need Primafit for I/Os  - C/w home Entresto 49/51mg BID and Coreg 25mg BID  - Trend Trops  - Core measures, strict I/Os, daily weights, 1L fluid restriction

## 2021-05-05 NOTE — PROGRESS NOTE ADULT - PROBLEM SELECTOR PLAN 7
hx of LAVERNE, baseline Hgb 9-10, currently stable at 9 today  -pt denies any melena/hematochezia or hemoptysis  -Iron Panel WNL, continue home Ferrous Sulfate 325mg QD  -Maintain active Type&Screen Lipids WNL  -Continue Atorvastatin 40mg QHS

## 2021-05-05 NOTE — PROGRESS NOTE ADULT - PROBLEM SELECTOR PLAN 9
A&O x 2 (person and place)  -Continue home Venlafaxine 75mg QD    F: None  E: Replete if K<4 or Mag<2  N: DASH Diet with 1L fluid restriction  VTEppx: Eliquis  Dispo: [facility] vs home PT Currently satting well RA and bibasilar crackles  -Continue Albuterol Q6 PRN and Spiriva QD

## 2021-05-05 NOTE — PROGRESS NOTE ADULT - PROBLEM SELECTOR PLAN 2
presented after syncopal episode, likely due to UTI. Pt currently asymptomatic, afebrile and no leukocytosis  -UA+ for small leuks, >10WBC, few yeast; UCx pending  -Continue Ceftriaxone 1g IV QD (through 5/8)

## 2021-05-05 NOTE — PROGRESS NOTE ADULT - PROBLEM SELECTOR PLAN 6
Lipids WNL  -Continue Atorvastatin 40mg QHS hx of non oliguric CKD III, baseline Cr 1-1.3  -Cr 1.21 on arrival, today 1.16  -Renally dose medications, avoid nephrotoxic agents and monitor UOP and daily BMP

## 2021-05-05 NOTE — PHYSICAL THERAPY INITIAL EVALUATION ADULT - LEVEL OF INDEPENDENCE: SIT/STAND, REHAB EVAL
unable to achieve full erect posture 2/2 pain B knees/moderate assist (50% patients effort)/maximum assist (25% patients effort)

## 2021-05-05 NOTE — CONSULT NOTE ADULT - SUBJECTIVE AND OBJECTIVE BOX
HPI: 88 y/o bedbound, Lebanese speaking Female, DNR/DNI (Advanced Directives in [employer] and confirmed w/ HCP Pebbles Lucius), with PMHx of Dementia (A&Ox2), HTN, HLD, Pre-DM, Chronic Systolic CHF (EF 20% by Echo on 21), Severe TR, Moderate-Severe AR, known LBBB, CKD Stage 3 (baseline Cr 1-1.3), Anemia (baseline Hgb 9s-10s), Asthma, Hypothyroidism, Depression, who presented to [facility] ED on 21 after syncopal episode witness by HHA. Pt was sitting on bed when HHA states pt looked tense with unilateral fist clenching followed by LOC for few minutes. Pt had no tongue biting, bowel/bladder incontinence, or any other seizure like symptoms. Pt quickly returned to baseline. Pt also c/o LE edema, SOB, orthopnea, and non-productive cough progressively worsening for the last couple of weeks. Her home Lasix was increased from 20 to 40mg QD, but with minimal improvement in her symptoms. Pt denies fevers, chills, cough, CP, palpitations, HA, dizziness, PND, n/v/d, abdominal pain, melena, BRBPR, or any other symptoms at this time.  On arrival to ED, VS noted as T 97.8, HR 94, /80, RR 18, SpO2 94% on RA. Labs significant for Trop 0.04, BNP 57029, Hgb 9.0, Cr 1.21, INR 2.21, , AST 47, UA +small leuks, >10WBC, few yeast. CXR wet read c/w pulmonary congestion and cardiomegaly, EKG SR w/ LBBB, CTH negative for acute findings.  In ED, pt given Lasix 20mg IV x 1, Lasix 40mg IV x 1. Pt is now admitted to cardiac telemetry for further management of acute on chronic systolic CHF exacerbation.    Ejection Fraction 15 - 20 %.    TSH 19.5, free T4 1.2  · 	levothyroxine 100 mcg (0.1 mg) oral tablet: Last Dose Taken:  , 1 tab(s) orally once a day - since 2020  supplement or vitamins?  taking appropriately?    FH:  DM:  Thyroid:  Autoimmune:  Other:    SH:   Former smoker (quit 25-30 years ago)  Denies ETOH or illicit drug use.   Has HHA    PMH & Surgical Hx:CHF SYNCOPE    ADVANCED ILLNESS    Yes    No pertinent family history in first degree relatives    Handoff    MEWS Score    Hypertension    HLD (hyperlipidemia)    Chronic systolic congestive heart failure    Stage 3 chronic kidney disease    Dementia, senile with depression    CHF (congestive heart failure)    Acute on chronic systolic CHF (congestive heart failure)    Syncope    UTI (urinary tract infection)    Anemia    Stage 3 chronic kidney disease    Asthma    HLD (hyperlipidemia)    Hypothyroidism    Dementia, senile with depression    No significant past surgical history    SYNCOPE    Paroxysmal atrial fibrillation    90+    Syncope    SysAdmin_VisitLink            Current Meds:  ALBUTerol    90 MICROgram(s) HFA Inhaler 2 Puff(s) Inhalation every 6 hours PRN  apixaban 5 milliGRAM(s) Oral two times a day  atorvastatin 40 milliGRAM(s) Oral at bedtime  carvedilol 25 milliGRAM(s) Oral every 12 hours  cefTRIAXone   IVPB      cefTRIAXone   IVPB 1000 milliGRAM(s) IV Intermittent every 24 hours  ferrous    sulfate 325 milliGRAM(s) Oral daily  furosemide   Injectable 40 milliGRAM(s) IV Push two times a day  gabapentin 100 milliGRAM(s) Oral two times a day  levothyroxine 100 MICROGram(s) Oral daily  sacubitril 49 mG/valsartan 51 mG 1 Tablet(s) Oral two times a day  senna 2 Tablet(s) Oral at bedtime  tiotropium 18 MICROgram(s) Capsule 1 Capsule(s) Inhalation daily  venlafaxine 75 milliGRAM(s) Oral daily      Allergies:  No Known Allergies      ROS:  Denies the following except as indicated.    General: weight loss/weight gain, decreased appetite, fatigue  Eyes: Blurry vision, double vision, visual changes  ENT: Throat pain, changes in voice,   CV: palpitations, SOB, CP, cough  GI: NVD, difficulty swallowing, abdominal pain  : polyuria, dysuria  Endo: abnormal menses, temperature intolerance, decreased libido  MSK: weakness, joint pain  Skin: rash, dryness, diaphoresis  Heme: Easy bruising, bleeding  Neuro: HA, dizziness, lightheadedness, numbness/ tingling  Psych: Anxiety, Depression    Vital Signs Last 24 Hrs  T(C): 37.1 (05 May 2021 09:09), Max: 37.1 (05 May 2021 09:09)  T(F): 98.8 (05 May 2021 09:09), Max: 98.8 (05 May 2021 09:09)  HR: 62 (05 May 2021 11:00) (58 - 94)  BP: 140/61 (05 May 2021 11:00) (100/52 - 152/66)  BP(mean): 88 (05 May 2021 11:00) (74 - 97)  RR: 15 (05 May 2021 11:00) (15 - 29)  SpO2: 99% (05 May 2021 11:00) (94% - 100%)  Height (cm): 157.5 ( @ 22:42)  Weight (kg): 59.4 ( @ 22:42)  BMI (kg/m2): 23.9 ( @ 22:42)      Constitutional: wn/wd in NAD.   HEENT: NCAT, MMM, OP clear, EOMI, , no proptosis or lid retraction  Neck: no thyromegaly or palpable thyroid nodules   Respiratory: lungs CTAB.  Cardiovascular: regular rhythm, normal S1 and S2, no audible murmurs, no peripheral edema  GI: soft, NT/ND, no masses/HSM appreciated.  Neurology: no tremors, DTR 2+  Skin: no visible rashes/lesions. no acanthosis nigricans. no hyperlipotrophy. no cushing's stigmata.  Psychiatric:  A&O to person, place (hospital), and says  for year.  Ext: radial pulses intact, DP pulses intact, extremities warm, no cyanosis, clubbing or edema.       LABS:                        9.0    3.54  )-----------( 171      ( 05 May 2021 06:37 )             29.6         142  |  105  |  27<H>  ----------------------------<  89  3.8   |  26  |  1.16    Ca    8.5      05 May 2021 06:37  Mg     2.1         TPro  7.2  /  Alb  3.3  /  TBili  0.5  /  DBili  x   /  AST  47<H>  /  ALT  19  /  AlkPhos  131<H>      PT/INR - ( 05 May 2021 06:37 )   PT: 24.2 sec;   INR: 2.09          PTT - ( 05 May 2021 06:37 )  PTT:37.8 sec  Urinalysis Basic - ( 04 May 2021 17:16 )    Color: Yellow / Appearance: Clear / S.020 / pH: x  Gluc: x / Ketone: NEGATIVE  / Bili: Negative / Urobili: 1.0 E.U./dL   Blood: x / Protein: 100 mg/dL / Nitrite: NEGATIVE   Leuk Esterase: Small / RBC: > 10 /HPF / WBC > 10 /HPF   Sq Epi: x / Non Sq Epi: 0-5 /HPF / Bacteria: Present /HPF        Thyroid Stimulating Hormone, Serum: 19.540 ( @ 22:53)      RADIOLOGY & ADDITIONAL STUDIES:  CAPILLARY BLOOD GLUCOSE      POCT Blood Glucose.: 120 mg/dL (04 May 2021 16:21)      Will discuss in rounds  A/P:88 y/o bedbound, Lebanese speaking Female, DNR/DNI (Advanced Directives in [employer] and confirmed w/ HCP Pebbles Kan), with PMHx of Dementia (A&Ox2), HTN, HLD, Pre-DM, Chronic Systolic CHF (EF 20% by Echo on 21), Severe TR, Moderate-Severe AR, known LBBB, CKD Stage 3 (baseline Cr 1-1.3), Anemia (baseline Hgb 9s-10s), Asthma, Hypothyroidism, Depression, who presented to [facility] ED on 21 after syncopal episode witness by HHA. Pt was sitting on bed when HHA states pt looked tense with unilateral fist clenching followed by LOC for few minutes. Pt had no tongue biting, bowel/bladder incontinence, or any other seizure like symptoms. Pt quickly returned to baseline. Pt also c/o LE edema, SOB, orthopnea, and non-productive cough progressively worsening for the last couple of weeks. Pt is now admitted to cardiac telemetry for further management of acute on chronic systolic CHF exacerbation.      1.  Hypothryodisim      Will continue to monitor     For discharge, pt can continue    Pt can follow up at discharge with [facility] by calling  to make an appointment.   Will discuss case with Dr. Jenkins    and update primary team   HPI: 89 y/o bedbound, [place] speaking Female, DNR/DNI (Advanced Directives in [employer] and confirmed w/ HCP Pebblesaida DahlKan), with PMHx of Dementia (A&Ox2), HTN, HLD, Pre-DM, Chronic Systolic CHF (EF 20% by Echo on 21), Severe TR, Moderate-Severe AR, known LBBB, CKD Stage 3 (baseline Cr 1-1.3), Anemia (baseline Hgb 9s-10s), Asthma, Hypothyroidism, Depression, who presented to [facility] ED on 21 after syncopal episode witness by HHA.   On arrival to ED, VS noted as T 97.8, HR 94, /80, RR 18, SpO2 94% on RA. Labs significant for Trop 0.04, BNP 74759, Hgb 9.0, Cr 1.21, INR 2.21, , AST 47, UA +small leuks, >10WBC, few yeast. CXR wet read c/w pulmonary congestion and cardiomegaly, EKG SR w/ LBBB, CTH negative for acute findings.  In ED, pt given Lasix 20mg IV x 1, Lasix 40mg IV x 1. Pt is now admitted to cardiac telemetry for further management of acute on chronic systolic CHF exacerbation. Ejection Fraction 15 - 20 %.    Endocrine consulted for TSH 19.5, free T4 1.2, and total T3 49. Per chart, she takes	levothyroxine 100 mcg daily since 2020. Last tSH in May 2020 was 0.96.  Utilized  Sonny #[id number]. Pt stated name, but did not answer any additional questions and kept falling asleep.  Unable to obtain history.    SH: per chart   Former smoker (quit 25-30 years ago)  Denies ETOH or illicit drug use.   Has HHA    PMH & Surgical Hx:CHF SYNCOPE    ADVANCED ILLNESS    Yes    No pertinent family history in first degree relatives    Handoff    MEWS Score    Hypertension    HLD (hyperlipidemia)    Chronic systolic congestive heart failure    Stage 3 chronic kidney disease    Dementia, senile with depression    CHF (congestive heart failure)    Acute on chronic systolic CHF (congestive heart failure)    Syncope    UTI (urinary tract infection)    Anemia    Stage 3 chronic kidney disease    Asthma    HLD (hyperlipidemia)    Hypothyroidism    Dementia, senile with depression    No significant past surgical history    SYNCOPE    Paroxysmal atrial fibrillation    90+    Syncope    SysAdmin_VisitLink            Current Meds:  ALBUTerol    90 MICROgram(s) HFA Inhaler 2 Puff(s) Inhalation every 6 hours PRN  apixaban 5 milliGRAM(s) Oral two times a day  atorvastatin 40 milliGRAM(s) Oral at bedtime  carvedilol 25 milliGRAM(s) Oral every 12 hours  cefTRIAXone   IVPB      cefTRIAXone   IVPB 1000 milliGRAM(s) IV Intermittent every 24 hours  ferrous    sulfate 325 milliGRAM(s) Oral daily  furosemide   Injectable 40 milliGRAM(s) IV Push two times a day  gabapentin 100 milliGRAM(s) Oral two times a day  levothyroxine 100 MICROGram(s) Oral daily  sacubitril 49 mG/valsartan 51 mG 1 Tablet(s) Oral two times a day  senna 2 Tablet(s) Oral at bedtime  tiotropium 18 MICROgram(s) Capsule 1 Capsule(s) Inhalation daily  venlafaxine 75 milliGRAM(s) Oral daily      Allergies:  No Known Allergies      ROS:  Unable to obtain 2/2 mental status    Vital Signs Last 24 Hrs  T(C): 37.1 (05 May 2021 09:09), Max: 37.1 (05 May 2021 09:09)  T(F): 98.8 (05 May 2021 09:09), Max: 98.8 (05 May 2021 09:09)  HR: 62 (05 May 2021 11:00) (58 - 94)  BP: 140/61 (05 May 2021 11:00) (100/52 - 152/66)  BP(mean): 88 (05 May 2021 11:00) (74 - 97)  RR: 15 (05 May 2021 11:00) (15 - 29)  SpO2: 99% (05 May 2021 11:00) (94% - 100%)  Height (cm): 157.5 ( @ 22:42)  Weight (kg): 59.4 ( @ 22:42)  BMI (kg/m2): 23.9 ( @ 22:42)      Constitutional: wn/wd in NAD.   HEENT: NCAT, MMM, OP clear, EOMI, , no proptosis or lid retraction  Neck: no thyromegaly or palpable thyroid nodules   Respiratory: lungs CTAB.  Cardiovascular: regular rhythm, normal S1 and S2, no audible murmurs, b/l LES 1+ pitting edema  GI: soft, NT/ND, no masses/HSM appreciated.  Neurology: no tremors, DTR 2+  Skin: no visible rashes/lesions.  Psychiatric:  A&O x 1  Ext: radial pulses intact, DP pulses intact, extremities warm, no cyanosis, clubbing        LABS:                        9.0    3.54  )-----------( 171      ( 05 May 2021 06:37 )             29.6         142  |  105  |  27<H>  ----------------------------<  89  3.8   |  26  |  1.16    Ca    8.5      05 May 2021 06:37  Mg     2.1         TPro  7.2  /  Alb  3.3  /  TBili  0.5  /  DBili  x   /  AST  47<H>  /  ALT  19  /  AlkPhos  131<H>      PT/INR - ( 05 May 2021 06:37 )   PT: 24.2 sec;   INR: 2.09          PTT - ( 05 May 2021 06:37 )  PTT:37.8 sec  Urinalysis Basic - ( 04 May 2021 17:16 )    Color: Yellow / Appearance: Clear / S.020 / pH: x  Gluc: x / Ketone: NEGATIVE  / Bili: Negative / Urobili: 1.0 E.U./dL   Blood: x / Protein: 100 mg/dL / Nitrite: NEGATIVE   Leuk Esterase: Small / RBC: > 10 /HPF / WBC > 10 /HPF   Sq Epi: x / Non Sq Epi: 0-5 /HPF / Bacteria: Present /HPF        Thyroid Stimulating Hormone, Serum: 19.540 ( @ 22:53)      RADIOLOGY & ADDITIONAL STUDIES:  CAPILLARY BLOOD GLUCOSE      POCT Blood Glucose.: 120 mg/dL (04 May 2021 16:21)        A/P:89 y/o bedbound, [place] speaking Female, DNR/DNI (Advanced Directives in [employer] and confirmed w/ HCP Pebbles Kan), with PMHx of Dementia (A&Ox2), HTN, HLD, Pre-DM, Chronic Systolic CHF (EF 20% by Echo on 21), Severe TR, Moderate-Severe AR, known LBBB, CKD Stage 3 (baseline Cr 1-1.3), Anemia (baseline Hgb 9s-10s), Asthma, Hypothyroidism, Depression, who presented to [facility] ED on 21 after syncopal episode witness by HHA now admitted to cardiac telemetry for further management of acute on chronic systolic CHF exacerbation, also with elevated TSH.      1.  Hypothyroidism  TSH 19.5, free T4 1.2, and total T3 49.  Please increase levothyroxine to 112mcg daily.    For discharge, pt can continue levothyroxine 112mcg daily taken in the AM, 30 minutes before food or other medications. Recheck TFTs in 4-6 weeks as outpt.    Endocrine will sign off. Please reconsult for any further concerns.   Will discuss case with Dr. Jenkins    and update primary team   HPI: 89 y/o bedbound, Kyrgyz speaking Female, DNR/DNI (Advanced Directives in [employer] and confirmed w/ HCP Pebbles Dahlanjo), with PMHx of Dementia (A&Ox2), HTN, HLD, Pre-DM, Chronic Systolic CHF (EF 20% by Echo on 21), Severe TR, Moderate-Severe AR, known LBBB, CKD Stage 3 (baseline Cr 1-1.3), Anemia (baseline Hgb 9s-10s), Asthma, Hypothyroidism, Depression, who presented to [facility] ED on 21 after syncopal episode witness by HHA.   On arrival to ED, VS noted as T 97.8, HR 94, /80, RR 18, SpO2 94% on RA. Labs significant for Trop 0.04, BNP 43014, Hgb 9.0, Cr 1.21, INR 2.21, , AST 47, UA +small leuks, >10WBC, few yeast. CXR wet read c/w pulmonary congestion and cardiomegaly, EKG SR w/ LBBB, CTH negative for acute findings.  In ED, pt given Lasix 20mg IV x 1, Lasix 40mg IV x 1. Pt is now admitted to cardiac telemetry for further management of acute on chronic systolic CHF exacerbation. Ejection Fraction 15 - 20 %.    Endocrine consulted for TSH 19.5, free T4 1.2, and total T3 49. Per chart, she takes	levothyroxine 100 mcg daily since 2020. Last tSH in May 2020 was 0.96.  Utilized  Sonny #[id number]. Pt stated name, but did not answer any additional questions and kept falling asleep.  Unable to obtain history.    SH: per chart   Former smoker (quit 25-30 years ago)  Denies ETOH or illicit drug use.   Has HHA    PMH & Surgical Hx:  CHF   SYNCOPE  Hypertension  HLD (hyperlipidemia)  Stage 3 chronic kidney disease  Dementia, senile with depression  Anemia  Asthma  Hypothyroidism  Paroxysmal atrial fibrillation            Current Meds:  ALBUTerol    90 MICROgram(s) HFA Inhaler 2 Puff(s) Inhalation every 6 hours PRN  apixaban 5 milliGRAM(s) Oral two times a day  atorvastatin 40 milliGRAM(s) Oral at bedtime  carvedilol 25 milliGRAM(s) Oral every 12 hours  cefTRIAXone   IVPB      cefTRIAXone   IVPB 1000 milliGRAM(s) IV Intermittent every 24 hours  ferrous    sulfate 325 milliGRAM(s) Oral daily  furosemide   Injectable 40 milliGRAM(s) IV Push two times a day  gabapentin 100 milliGRAM(s) Oral two times a day  levothyroxine 100 MICROGram(s) Oral daily  sacubitril 49 mG/valsartan 51 mG 1 Tablet(s) Oral two times a day  senna 2 Tablet(s) Oral at bedtime  tiotropium 18 MICROgram(s) Capsule 1 Capsule(s) Inhalation daily  venlafaxine 75 milliGRAM(s) Oral daily      Allergies:  No Known Allergies      ROS:  Unable to obtain 2/2 mental status    Vital Signs Last 24 Hrs  T(C): 37.1 (05 May 2021 09:09), Max: 37.1 (05 May 2021 09:09)  T(F): 98.8 (05 May 2021 09:09), Max: 98.8 (05 May 2021 09:09)  HR: 62 (05 May 2021 11:00) (58 - 94)  BP: 140/61 (05 May 2021 11:00) (100/52 - 152/66)  BP(mean): 88 (05 May 2021 11:00) (74 - 97)  RR: 15 (05 May 2021 11:00) (15 - 29)  SpO2: 99% (05 May 2021 11:00) (94% - 100%)  Height (cm): 157.5 ( @ 22:42)  Weight (kg): 59.4 ( @ 22:42)  BMI (kg/m2): 23.9 ( @ 22:42)      Constitutional: wn/wd in NAD.   HEENT: NCAT, MMM, OP clear, EOMI, , no proptosis or lid retraction  Neck: no thyromegaly or palpable thyroid nodules   Respiratory: lungs CTAB.  Cardiovascular: regular rhythm, normal S1 and S2, no audible murmurs, b/l LES 1+ pitting edema  GI: soft, NT/ND, no masses/HSM appreciated.  Neurology: no tremors, DTR 2+  Skin: no visible rashes/lesions.  Psychiatric:  A&O x 1  Ext: radial pulses intact, DP pulses intact, extremities warm, no cyanosis, clubbing        LABS:                        9.0    3.54  )-----------( 171      ( 05 May 2021 06:37 )             29.6         142  |  105  |  27<H>  ----------------------------<  89  3.8   |  26  |  1.16    Ca    8.5      05 May 2021 06:37  Mg     2.1         TPro  7.2  /  Alb  3.3  /  TBili  0.5  /  DBili  x   /  AST  47<H>  /  ALT  19  /  AlkPhos  131<H>      PT/INR - ( 05 May 2021 06:37 )   PT: 24.2 sec;   INR: 2.09          PTT - ( 05 May 2021 06:37 )  PTT:37.8 sec  Urinalysis Basic - ( 04 May 2021 17:16 )    Color: Yellow / Appearance: Clear / S.020 / pH: x  Gluc: x / Ketone: NEGATIVE  / Bili: Negative / Urobili: 1.0 E.U./dL   Blood: x / Protein: 100 mg/dL / Nitrite: NEGATIVE   Leuk Esterase: Small / RBC: > 10 /HPF / WBC > 10 /HPF   Sq Epi: x / Non Sq Epi: 0-5 /HPF / Bacteria: Present /HPF        Thyroid Stimulating Hormone, Serum: 19.540 ( @ 22:53)      RADIOLOGY & ADDITIONAL STUDIES:  CAPILLARY BLOOD GLUCOSE      POCT Blood Glucose.: 120 mg/dL (04 May 2021 16:21)        A/P:89 y/o bedbound, Kyrgyz speaking Female, DNR/DNI (Advanced Directives in [employer] and confirmed w/ HCP Pebbles Kan), with PMHx of Dementia (A&Ox2), HTN, HLD, Pre-DM, Chronic Systolic CHF (EF 20% by Echo on 21), Severe TR, Moderate-Severe AR, known LBBB, CKD Stage 3 (baseline Cr 1-1.3), Anemia (baseline Hgb 9s-10s), Asthma, Hypothyroidism, Depression, who presented to [facility] ED on 21 after syncopal episode witness by HHA now admitted to cardiac telemetry for further management of acute on chronic systolic CHF exacerbation, also with elevated TSH.      1.  Hypothyroidism  TSH 19.5, free T4 1.2, and total T3 49.  Please increase levothyroxine to 112mcg daily.    For discharge, pt can continue levothyroxine 112mcg daily taken in the AM, 30 minutes before food or other medications. Recheck TFTs in 4-6 weeks as outpt.    Endocrine will sign off. Please reconsult for any further concerns.   Will discuss case with Dr. Jenkins    and update primary team

## 2021-05-05 NOTE — PROGRESS NOTE ADULT - PROBLEM SELECTOR PLAN 8
Currently satting well RA and bibasilar crackles  -Continue Albuterol Q6 PRN and Spiriva QD hx of LAVERNE, baseline Hgb 9-10, currently stable at 9 today  -pt denies any melena/hematochezia or hemoptysis  -Iron Panel WNL, continue home Ferrous Sulfate 325mg QD  -Maintain active Type&Screen

## 2021-05-05 NOTE — PROGRESS NOTE ADULT - PROBLEM SELECTOR PLAN 5
hx of non oliguric CKD III, baseline Cr 1-1.3  -Cr 1.21 on arrival, today 1.16  -Renally dose medications, avoid nephrotoxic agents and monitor UOP and daily BMP NSR; no documented hx of AFib in [employer] and pt on Eliquis 5mg BID at home, HCP denies h/o DVT or PE  -EKG revealing NSR and LBBB  -Will call Rufus Diaz NP at [phone] for collateral information and indication for Eliquis  -Continue Eliquis 5mg BID and Carvedilol 25mg BID

## 2021-05-05 NOTE — DIETITIAN INITIAL EVALUATION ADULT. - PROBLEM SELECTOR PLAN 5
Hgb 9.0 on arrival (baseline 9s-10s over last year in [facility]). Denies s/s of bleeding  - C/w home Ferrous Sulfate 325mg QD  - F/u Iron studies  - Maintain active T/S (ordered)

## 2021-05-05 NOTE — DIETITIAN INITIAL EVALUATION ADULT. - ADD RECOMMEND
Monitor Skin (MVI daily, Vit C 500mg/day). Monitor Daily wts, Labs, GI. Monitor for GOC- Honor at all times. RD to remain available for additional nutrition interventions as needed.

## 2021-05-06 NOTE — PROGRESS NOTE ADULT - PROBLEM SELECTOR PLAN 3
TSH 19.540, Free Thyroxine WNL  -Dr. Jenkins consulted  -Increase home Levothyroxine to 112mcg QD TSH 19.540, Free Thyroxine WNL, T3 Total 49  -Dr. Jenkins consulted  -Increase home Levothyroxine to 112mcg QD TSH 19.540, Free Thyroxine WNL, T3 Total 49  -Dr. Jenkins consulted and follwing  -Increase home Levothyroxine to 112mcg QD TSH 19.540, Free Thyroxine WNL, T3 Total 49  -Dr. Jenkins consulted and increase home Levothyroxine to 112mcg QD. Endocrinology signed off

## 2021-05-06 NOTE — PROGRESS NOTE ADULT - PROBLEM SELECTOR PLAN 6
hx of non oliguric CKD III, baseline Cr 1-1.3  -Cr 1.21 on arrival, today __  -Renally dose medications, avoid nephrotoxic agents and monitor UOP and daily BMP hx of non oliguric CKD III, baseline Cr 1-1.3  -Cr 1.21 on arrival, today 1.23  -Renally dose medications, avoid nephrotoxic agents and monitor UOP and daily BMP

## 2021-05-06 NOTE — PROGRESS NOTE ADULT - PROBLEM SELECTOR PLAN 4
hx of severe TR on outpt Echo  -Prior outpt Echo 1/30/21 revealing mod MR, severe TR (RVSP 56mmHg), mild WI, mod-severe AR  -Repeat Echo 5/5/21 revealed mod-severe TR, mod AR, mild MR  -Consider to manage volume status as above Hx of severe TR on outpt Echo  -Prior outpt Echo 1/30/21 revealing mod MR, severe TR (RVSP 56mmHg), mild NM, mod-severe AR  -Repeat Echo 5/5/21 revealed mod-severe TR, mod AR, mild MR  -Consider to manage volume status as above Hx of severe TR on outpt Echo, repeat Echo revealing mod-severe TR  -Prior outpt Echo 1/30/21 revealing mod MR, severe TR (RVSP 56mmHg), mild TN, mod-severe AR  -Repeat Echo 5/5/21 revealed mod-severe TR, mod AR, mild MR  -Continue to manage volume status as above

## 2021-05-06 NOTE — PROGRESS NOTE ADULT - SUBJECTIVE AND OBJECTIVE BOX
Cardiology PA Adult Progress Note    Subjective Assessment:  	  MEDICATIONS:  carvedilol 25 milliGRAM(s) Oral every 12 hours  furosemide   Injectable 40 milliGRAM(s) IV Push two times a day  sacubitril 49 mG/valsartan 51 mG 1 Tablet(s) Oral two times a day    cefTRIAXone   IVPB 1000 milliGRAM(s) IV Intermittent every 24 hours  ALBUTerol    90 MICROgram(s) HFA Inhaler 2 Puff(s) Inhalation every 6 hours PRN  tiotropium 18 MICROgram(s) Capsule 1 Capsule(s) Inhalation daily  gabapentin 100 milliGRAM(s) Oral two times a day  venlafaxine 75 milliGRAM(s) Oral daily  senna 2 Tablet(s) Oral at bedtime  atorvastatin 40 milliGRAM(s) Oral at bedtime  levothyroxine 112 MICROGram(s) Oral daily  apixaban 5 milliGRAM(s) Oral two times a day  ferrous    sulfate 325 milliGRAM(s) Oral daily    VITAL SIGNS:  T(C): 36.7 (05-06-21 @ 06:46), Max: 37.2 (05-05-21 @ 22:10)  HR: 63 (05-06-21 @ 05:30) (61 - 71)  BP: 119/56 (05-06-21 @ 05:30) (104/59 - 140/61)  RR: 17 (05-06-21 @ 05:30) (15 - 24)  SpO2: 95% (05-06-21 @ 05:30) (92% - 100%)    I&O's Summary  05 May 2021 07:01  -  06 May 2021 07:00  --------------------------------------------------------  IN: 410 mL / OUT: 2750 mL / NET: -2340 mL                                      PHYSICAL EXAM:  Appearance: Normal	  HEENT: Normal oral mucosa, PERRL, EOMI	  Neck: Supple, + JVD/ - JVD; Carotid Bruit   Cardiovascular: Normal S1 S2, No JVD, No murmurs,   Respiratory: Lungs clear to auscultation/Decreased Breath Sounds/No Rales, Rhonchi, Wheezing	  Gastrointestinal:  Soft, Non-tender, + BS	  Skin: No rashes, No ecchymoses, No cyanosis  Extremities: Normal range of motion, No clubbing, cyanosis or edema  Vascular: Peripheral pulses palpable 2+ bilaterally  Neurologic: Non-focal  Psychiatry: A & O x 3, Mood & affect appropriate	    LABS:	              9.0    3.54  )-----------( 171      ( 05 May 2021 06:37 )             29.6     142  |  105  |  27<H>  ----------------------------<  89  3.8   |  26  |  1.16    Ca    8.5      05 May 2021 06:37  Mg     2.1     05-05  TPro  7.2  /  Alb  3.3  /  TBili  0.5  /  DBili  x   /  AST  47<H>  /  ALT  19  /  AlkPhos  131<H>  05-04  PT/INR - ( 05 May 2021 06:37 )   PT: 24.2 sec;   INR: 2.09     PTT - ( 05 May 2021 06:37 )  PTT:37.8 sec     Cardiology PA Adult Progress Note    Subjective Assessment: Pt seen and examined at bedside this AM with complains of intermittent SOB and dry cough. Denies any orthopnea, CP, palpitations, dizziness/lightheadedness, N/V, F or abd pain.   	  MEDICATIONS:  carvedilol 25 milliGRAM(s) Oral every 12 hours  furosemide   Injectable 40 milliGRAM(s) IV Push two times a day  sacubitril 49 mG/valsartan 51 mG 1 Tablet(s) Oral two times a day    cefTRIAXone   IVPB 1000 milliGRAM(s) IV Intermittent every 24 hours  ALBUTerol    90 MICROgram(s) HFA Inhaler 2 Puff(s) Inhalation every 6 hours PRN  tiotropium 18 MICROgram(s) Capsule 1 Capsule(s) Inhalation daily  gabapentin 100 milliGRAM(s) Oral two times a day  venlafaxine 75 milliGRAM(s) Oral daily  senna 2 Tablet(s) Oral at bedtime  atorvastatin 40 milliGRAM(s) Oral at bedtime  levothyroxine 112 MICROGram(s) Oral daily  apixaban 5 milliGRAM(s) Oral two times a day  ferrous    sulfate 325 milliGRAM(s) Oral daily    VITAL SIGNS:  T(C): 36.7 (05-06-21 @ 06:46), Max: 37.2 (05-05-21 @ 22:10)  HR: 63 (05-06-21 @ 05:30) (61 - 71)  BP: 119/56 (05-06-21 @ 05:30) (104/59 - 140/61)  RR: 17 (05-06-21 @ 05:30) (15 - 24)  SpO2: 95% (05-06-21 @ 05:30) (92% - 100%)    I&O's Summary  05 May 2021 07:01  -  06 May 2021 07:00  --------------------------------------------------------  IN: 410 mL / OUT: 2750 mL / NET: -2340 mL                                      PHYSICAL EXAM:  Appearance: Normal	  HEENT: Normal oral mucosa, PERRL, EOMI	  Neck: Supple, + JVD/ - JVD; Carotid Bruit   Cardiovascular: Normal S1 S2, No JVD, No murmurs,   Respiratory: Lungs clear to auscultation/Decreased Breath Sounds/No Rales, Rhonchi, Wheezing	  Gastrointestinal:  Soft, Non-tender, + BS	  Skin: No rashes, No ecchymoses, No cyanosis  Extremities: Normal range of motion, No clubbing, cyanosis or edema  Vascular: Peripheral pulses palpable 2+ bilaterally  Neurologic: Non-focal  Psychiatry: A & O x 3, Mood & affect appropriate	    LABS:	              9.0    3.54  )-----------( 171      ( 05 May 2021 06:37 )             29.6     142  |  105  |  27<H>  ----------------------------<  89  3.8   |  26  |  1.16    Ca    8.5      05 May 2021 06:37  Mg     2.1     05-05  TPro  7.2  /  Alb  3.3  /  TBili  0.5  /  DBili  x   /  AST  47<H>  /  ALT  19  /  AlkPhos  131<H>  05-04  PT/INR - ( 05 May 2021 06:37 )   PT: 24.2 sec;   INR: 2.09     PTT - ( 05 May 2021 06:37 )  PTT:37.8 sec     Cardiology PA Adult Progress Note    Subjective Assessment: Pt seen and examined at bedside this AM with complains of intermittent SOB and dry cough. Denies any orthopnea, CP, palpitations, dizziness/lightheadedness, N/V, F or abd pain.   	  MEDICATIONS:  carvedilol 25 milliGRAM(s) Oral every 12 hours  furosemide   Injectable 40 milliGRAM(s) IV Push two times a day  sacubitril 49 mG/valsartan 51 mG 1 Tablet(s) Oral two times a day    cefTRIAXone   IVPB 1000 milliGRAM(s) IV Intermittent every 24 hours  ALBUTerol    90 MICROgram(s) HFA Inhaler 2 Puff(s) Inhalation every 6 hours PRN  tiotropium 18 MICROgram(s) Capsule 1 Capsule(s) Inhalation daily  gabapentin 100 milliGRAM(s) Oral two times a day  venlafaxine 75 milliGRAM(s) Oral daily  senna 2 Tablet(s) Oral at bedtime  atorvastatin 40 milliGRAM(s) Oral at bedtime  levothyroxine 112 MICROGram(s) Oral daily  apixaban 5 milliGRAM(s) Oral two times a day  ferrous    sulfate 325 milliGRAM(s) Oral daily    VITAL SIGNS:  T(C): 36.7 (05-06-21 @ 06:46), Max: 37.2 (05-05-21 @ 22:10)  HR: 63 (05-06-21 @ 05:30) (61 - 71)  BP: 119/56 (05-06-21 @ 05:30) (104/59 - 140/61)  RR: 17 (05-06-21 @ 05:30) (15 - 24)  SpO2: 95% (05-06-21 @ 05:30) (92% - 100%)    I&O's Summary  05 May 2021 07:01  -  06 May 2021 07:00  --------------------------------------------------------  IN: 410 mL / OUT: 2750 mL / NET: -2340 mL                                      PHYSICAL EXAM:  Appearance: Normal	  HEENT: Normal oral mucosa, PERRL, EOMI	  Neck: Supple, - JVD; No Carotid Bruit   Cardiovascular: Normal S1 S2, No JVD, No murmurs,   Respiratory: scattered wheezing B/L  Gastrointestinal:  Soft, Non-tender, + BS	  Skin: No rashes, No ecchymoses, No cyanosis  Extremities: trace pitting edema B/L  Vascular: Peripheral pulses palpable 2+ bilaterally  Neurologic: Non-focal  Psychiatry: A&O x 2, Mood & affect appropriate	    LABS:	              9.0    3.54  )-----------( 171      ( 05 May 2021 06:37 )             29.6     142  |  105  |  27<H>  ----------------------------<  89  3.8   |  26  |  1.16    Ca    8.5      05 May 2021 06:37  Mg     2.1     05-05  TPro  7.2  /  Alb  3.3  /  TBili  0.5  /  DBili  x   /  AST  47<H>  /  ALT  19  /  AlkPhos  131<H>  05-04  PT/INR - ( 05 May 2021 06:37 )   PT: 24.2 sec;   INR: 2.09     PTT - ( 05 May 2021 06:37 )  PTT:37.8 sec     Cardiology PA Adult Progress Note    Subjective Assessment: Pt seen and examined at bedside this AM with complains of intermittent SOB and dry cough. Denies any orthopnea, CP, palpitations, dizziness/lightheadedness, N/V, or abd pain.   	  MEDICATIONS:  carvedilol 25 milliGRAM(s) Oral every 12 hours  furosemide   Injectable 40 milliGRAM(s) IV Push two times a day  sacubitril 49 mG/valsartan 51 mG 1 Tablet(s) Oral two times a day    cefTRIAXone   IVPB 1000 milliGRAM(s) IV Intermittent every 24 hours  ALBUTerol    90 MICROgram(s) HFA Inhaler 2 Puff(s) Inhalation every 6 hours PRN  tiotropium 18 MICROgram(s) Capsule 1 Capsule(s) Inhalation daily  gabapentin 100 milliGRAM(s) Oral two times a day  venlafaxine 75 milliGRAM(s) Oral daily  senna 2 Tablet(s) Oral at bedtime  atorvastatin 40 milliGRAM(s) Oral at bedtime  levothyroxine 112 MICROGram(s) Oral daily  apixaban 5 milliGRAM(s) Oral two times a day  ferrous    sulfate 325 milliGRAM(s) Oral daily    VITAL SIGNS:  T(C): 36.7 (05-06-21 @ 06:46), Max: 37.2 (05-05-21 @ 22:10)  HR: 63 (05-06-21 @ 05:30) (61 - 71)  BP: 119/56 (05-06-21 @ 05:30) (104/59 - 140/61)  RR: 17 (05-06-21 @ 05:30) (15 - 24)  SpO2: 95% (05-06-21 @ 05:30) (92% - 100%)    I&O's Summary  05 May 2021 07:01  -  06 May 2021 07:00  --------------------------------------------------------  IN: 410 mL / OUT: 2750 mL / NET: -2340 mL                                      PHYSICAL EXAM:  Appearance: Normal	  HEENT: Normal oral mucosa, PERRL, EOMI	  Neck: Supple, - JVD; No Carotid Bruit   Cardiovascular: Normal S1 S2, No JVD, No murmurs,   Respiratory: scattered wheezing and faint crackles B/L  Gastrointestinal:  Soft, Non-tender, + BS	  Skin: No rashes, No ecchymoses, No cyanosis  Extremities: trace pitting edema B/L  Vascular: Peripheral pulses palpable 2+ bilaterally  Neurologic: Non-focal  Psychiatry: A&O x 2, Mood & affect appropriate	    LABS:	              9.0    3.54  )-----------( 171      ( 05 May 2021 06:37 )             29.6     142  |  105  |  27<H>  ----------------------------<  89  3.8   |  26  |  1.16    Ca    8.5      05 May 2021 06:37  Mg     2.1     05-05  TPro  7.2  /  Alb  3.3  /  TBili  0.5  /  DBili  x   /  AST  47<H>  /  ALT  19  /  AlkPhos  131<H>  05-04  PT/INR - ( 05 May 2021 06:37 )   PT: 24.2 sec;   INR: 2.09     PTT - ( 05 May 2021 06:37 )  PTT:37.8 sec

## 2021-05-06 NOTE — DISCHARGE NOTE PROVIDER - NSDCQMERRANDS_GEN_ALL_CORE
After obtaining consent, and per orders of [facility], [place], injection of Prevnar 13 and Hepatitis A was given in Left vastus lateralis by [facility]. Patient parents  instructed to  report any adverse reaction to the office immediately. Yes

## 2021-05-06 NOTE — PROGRESS NOTE ADULT - PROBLEM SELECTOR PLAN 8
hx of LAVERNE, baseline Hgb 9-10, currently stable at ___ today  -pt denies any melena/hematochezia or hemoptysis  -Iron Panel WNL, continue home Ferrous Sulfate 325mg QD  -Maintain active Type&Screen Hx of LAVERNE, baseline Hgb 9-10, currently stable at 9.4 today  -Pt denies any melena/hematochezia or hemoptysis  -Iron Panel WNL, continue home Ferrous Sulfate 325mg QD  -Maintain active Type&Screen

## 2021-05-06 NOTE — PROGRESS NOTE ADULT - PROBLEM SELECTOR PLAN 2
presented after syncopal episode, likely due to UTI. Pt currently asymptomatic, afebrile and no leukocytosis  -UA+ for small leuks, >10WBC, few yeast; UCx pending  -Continue Ceftriaxone 1g IV QD (through 5/8) Presented after syncopal episode, likely due to UTI. Pt currently asymptomatic, afebrile and no leukocytosis  -UA+ for small leuks, >10WBC, few yeast; UCx pending  -Continue Ceftriaxone 1g IV QD (through 5/8)

## 2021-05-06 NOTE — DISCHARGE NOTE PROVIDER - HOSPITAL COURSE
**incomplete**      89yo Martiniquais speaking Female, DNR/DNI, with PMHx of Dementia (A&O x2), HTN, HLD, Pre-DM, Chronic Systolic CHF (EF 15-20%), mod-severe TR, known LBBB, CKD Stage 3 (baseline Cr 1-1.3), Anemia (baseline Hgb 9s-10s), Asthma, Hypothyroidism and Depression, who presented to [facility] ED on 5/4/21 after witnessed syncopal episode and found to be in acute on chronic CHF exacerbation. Pt diuresed well on Lasix IV and transitioned to __ on day of discharge, pt net negative ___ overall. Echo revealed EF 15-20% with global hypokinesis, dilated LV, biatrial enlargement, mod AR, mild MR, mod-severe TR, PH (PASP 63mmHg), trivial pericardial effusion, left pleural effusion. Hospital course significant for UTI and pt s/p Ceftriaxone 1g IV QD x 3 days. Pt also found to have elevated TSH to 19.540, Endo consulted and home Levothyroxine increased to 112mcg QD.       89yo Tamazight speaking Female, DNR/DNI, with PMHx of Dementia (A&O x2), HTN, HLD, Pre-DM, Chronic Systolic CHF (EF 15-20%), mod-severe TR, known LBBB, CKD Stage 3 (baseline Cr 1-1.3), Anemia (baseline Hgb 9s-10s), Asthma, Hypothyroidism and Depression, who presented to [facility] ED on 5/4/21 after witnessed syncopal episode and found to be in acute on chronic CHF exacerbation. Pt diuresed well on Lasix IV and transitioned to __ on day of discharge. Echo revealed EF 15-20% with global hypokinesis, dilated LV, biatrial enlargement, mod AR, mild MR, mod-severe TR, PH (PASP 63mmHg), trivial pericardial effusion, left pleural effusion. Hospital course significant for UTI and pt s/p Ceftriaxone 1g IV QD x 3 days. Pt also found to have elevated TSH to 19.540, Endo consulted and home Levothyroxine increased to 112mcg QD.       88yo Greenlandic speaking Female, DNR/DNI, with PMHx of Dementia (A&O x2), HTN, HLD, Pre-DM, Chronic Systolic CHF (EF 15-20%), mod-severe TR, known LBBB, CKD Stage 3 (baseline Cr 1-1.3), Anemia (baseline Hgb 9s-10s), Asthma, Hypothyroidism and Depression, who presented to [facility] ED on 5/4/21 after witnessed syncopal episode and found to be in acute on chronic CHF exacerbation. Pt diuresed well on Lasix IV and transitioned to __ on day of discharge. Echo revealed EF 15-20% with global hypokinesis, dilated LV, biatrial enlargement, mod AR, mild MR, mod-severe TR, PH (PASP 63mmHg), trivial pericardial effusion, left pleural effusion. Hospital course significant for UTI and pt s/p Ceftriaxone 1g IV QD x 3 days. Pt also found to have elevated TSH to 19.540, Endo consulted and home Levothyroxine increased to 112mcg QD. Pt euvolemic on 5/8/21 and was transitioned to Furosemide 40mg daily. Pt's Eliquis was decreased to 2.5mg BID on admission. Instructions were given to pt's daughter Vania. Pt's labs, VSS. Pt does not require home oxygen. Hospital bed prescription sent. Pt needs a referral to determine if she qualifies for an increase in HHA hours.         88yo Urdu speaking Female, DNR/DNI, with PMHx of Dementia (A&O x2), HTN, HLD, Pre-DM, Chronic Systolic CHF (EF 15-20%), mod-severe TR, known LBBB, CKD Stage 3 (baseline Cr 1-1.3), Anemia (baseline Hgb 9s-10s), Asthma, Hypothyroidism and Depression, who presented to [facility] ED on 5/4/21 after witnessed syncopal episode and found to be in acute on chronic CHF exacerbation. Pt diuresed well on Lasix IV and transitioned to __ on day of discharge. Echo revealed EF 15-20% with global hypokinesis, dilated LV, biatrial enlargement, mod AR, mild MR, mod-severe TR, PH (PASP 63mmHg), trivial pericardial effusion, left pleural effusion. Hospital course significant for UTI and pt s/p Ceftriaxone 1g IV QD x 3 days. Pt also found to have elevated TSH to 19.540, Endo consulted and home Levothyroxine increased to 112mcg QD. Pt euvolemic on 5/8/21 and was transitioned to Furosemide 40mg daily. Pt's Eliquis was decreased to 2.5mg BID on admission. Instructions were given to pt's daughter Vania. Pt's labs, VSS. Pt does not require home oxygen. General: NAD. Lungs: CTA b/l, no wheezes, rales, rhonchi. Cardiac: S1/S2 clear, no murmurs appreciated. Extremities: no pedal edema b/l. Hospital bed prescription sent. Pt needs a referral to determine if she qualifies for an increase in HHA hours.

## 2021-05-06 NOTE — PROGRESS NOTE ADULT - PROBLEM SELECTOR PLAN 10
A&O x 2 (person and place)  -Continue home Venlafaxine 75mg QD    F: None  E: Replete if K<4 or Mag<2  N: DASH Diet with 1L fluid restriction  VTEppx: Eliquis  Dispo: [facility] vs home PT A&O x 2 (person and place)  -Continue home Venlafaxine 75mg QD    F: None  E: Replete if K<4 or Mag<2  N: DASH Diet with 1L fluid restriction  VTEppx: Eliquis  Dispo: pending diuresis  PT: home PT

## 2021-05-06 NOTE — DISCHARGE NOTE PROVIDER - NSDCCPCAREPLAN_GEN_ALL_CORE_FT
PRINCIPAL DISCHARGE DIAGNOSIS  Diagnosis: Acute on chronic systolic congestive heart failure  Assessment and Plan of Treatment: Follow up with Dr Chance Au on 05/14/21 at 11am. Your heart is weak. This is called Heart Failure. You had too much fluid in your body and needed IV medications to help get you back to a good fluid balance. CONTINUE taking FUROSEMIDE 40mg daily. This is water pill (diuretic) that helps you maintain a good fluid balance. Do not dirnk more than 1.5 liters in a day or consume more than 2000mg/2grams of salt/sodium a day. If you develop leg swelling or gain more than 2 pounds in 1 day or 5 pounds in one week you should call your doctor and they may increase your FUROSEMIDE dose. If you develop shortness of breath call your doctor and go to the nearest Emergency Department immediately. Other medications for your heart failure include CARVEDILOL 25mg twice a day, ENTRESTO (Sacubatril/Valsartan) 49mg/51mg twice a day, and CORLANOR 5mg twice a day. These medications help your heart pump strongly and help control your heart rate.      SECONDARY DISCHARGE DIAGNOSES  Diagnosis: Hypothyroidism  Assessment and Plan of Treatment: You need more thyroid medication. STOP taking LEVOTHYROXINE 100mcg daily. START taking LEVOTHYROXINE 112mcg daily.    Diagnosis: Asthma  Assessment and Plan of Treatment: START taking SPIRIVA 18mcg one inhalation daily. This will help you breath better. Continue taking your rescue inhaler Albuterol 90mcg 2 puffs every 4 hours as needed for shortness of breath.    Diagnosis: Paroxysmal atrial fibrillation  Assessment and Plan of Treatment: You have an irregular heart rhythm called ATRIAL FIBRILLATION. The doctors are DECREASING your ELIQUIS (APIXABAN) dose. STOP taking ELIQUIS (APIXABAN) 5mg twice a day. START taking ELIQUIS (APIXABAN) 5mg twice a day. This is a blood thinner to help avoid blood clots forming in your heart that could give you a stroke.    Diagnosis: Syncope  Assessment and Plan of Treatment: You lost consciousness before coming to the hospital. This is likely because you had a urinary tract infection (bladder infection). You completed your antibiotics in the hospital.

## 2021-05-06 NOTE — PROGRESS NOTE ADULT - ASSESSMENT
89yo Nepali speaking Female, DNR/DNI, with PMHx of Dementia (A&O x2), HTN, HLD, Pre-DM, Chronic Systolic CHF (EF 15-20%), mod-severe TR, known LBBB, CKD Stage 3 (baseline Cr 1-1.3), Anemia (baseline Hgb 9s-10s), Asthma, Hypothyroidism and Depression, who presented to [facility] ED on 5/4/21 after witnessed syncopal episode and found to be in acute on chronic CHF exacerbation and to have a UTI, currently IV diuresing well and on IV abx. Hospital course significant for elevated TSH, Endocrinology following. 88yo Thai speaking Female, DNR/DNI, with PMHx of Dementia (A&O x2), HTN, HLD, Pre-DM, Chronic Systolic CHF (EF 15-20%), mod-severe TR, known LBBB, CKD Stage 3 (baseline Cr 1-1.3), Anemia (baseline Hgb 9s-10s), Asthma, Hypothyroidism and Depression, who presented to [facility] ED on 5/4/21 after witnessed syncopal episode and found to be in acute on chronic CHF exacerbation and to have a UTI, currently IV diuresing well and on IV abx. Hospital course significant for elevated TSH, Endocrinology following. Discharge pending PT recs [facility] vs Home PT and GOC discussion with family. 88yo Icelandic speaking Female, DNR/DNI, with PMHx of Dementia (A&O x2), HTN, HLD, Pre-DM, Chronic Systolic CHF (EF 15-20%), mod-severe TR, known LBBB, CKD Stage 3 (baseline Cr 1-1.3), Anemia (baseline Hgb 9s-10s), Asthma, Hypothyroidism and Depression, who presented to [facility] ED on 5/4/21 after witnessed syncopal episode and found to be in acute on chronic CHF exacerbation and a UTI, currently IV diuresing well and on IV abx. Hospital course significant for elevated TSH, Endocrinology following. 88yo Polish speaking Female, DNR/DNI, with PMHx of Dementia (A&O x2), HTN, HLD, Pre-DM, Chronic Systolic CHF (EF 15-20%), mod-severe TR, known LBBB, CKD Stage 3 (baseline Cr 1-1.3), Anemia (baseline Hgb 9s-10s), Asthma, Hypothyroidism and Depression, who presented to [facility] ED on 5/4/21 after witnessed syncopal episode and found to be in acute on chronic CHF exacerbation and a UTI, currently IV diuresing well and on IV abx. 88yo Faroese speaking Female, DNR/DNI (Advanced Directives in [employer]), with PMHx of Dementia (A&O x2), HTN, HLD, Pre-DM, Chronic Systolic CHF (EF 15-20%), mod-severe TR, known LBBB, CKD Stage 3 (baseline Cr 1-1.3), Anemia (baseline Hgb 9s-10s), Asthma, Hypothyroidism and Depression, who presented to [facility] ED on 5/4/21 after witnessed syncopal episode and found to be in acute on chronic CHF exacerbation and a UTI, currently IV diuresing well and on IV abx.

## 2021-05-06 NOTE — DISCHARGE NOTE PROVIDER - CARE PROVIDER_API CALL
Chance Au G  CARDIOLOGY  [address] [address]  Phone: [phone]  Fax: [phone]  Established Patient  Scheduled Appointment: 05/14/2021 11:00 AM

## 2021-05-06 NOTE — PROGRESS NOTE ADULT - PROBLEM SELECTOR PLAN 9
Currently satting well RA  -Continue Albuterol Q6 PRN and Spiriva QD scattered wheezing B/L  -Continue Duonebs PRN, Albuterol Q6 PRN and Spiriva QD

## 2021-05-06 NOTE — PROGRESS NOTE ADULT - PROBLEM SELECTOR PLAN 1
volume overloaded with ___; SpO2 99% on RA and net neg 2L/24H  -BNP 89163, CXR with right basilar opacity/pleural effusion, left basilar focal atelectasis, cardiomegaly  -Echo 5/5/21: EF 15-20% with global hypokinesis, dilated LV, biatrial enlargement, mod AR, mild MR, mod-severe TR, PH (PASP 63mmHg), trivial pericardial effusion, left pleural effusion  -Continue Lasix 40mg IV BID, Entresto 49/51mg BID and Carvedilol 25mg BID  -Core measures, daily weight, strict I&Os with 1L fluid restriction and primafit. Volume overloaded with bibasilar crackles and 1+ pitting edema; SpO2 99% on RA and net neg 2L/24H  -BNP 65163, CXR with right basilar opacity/pleural effusion, left basilar focal atelectasis, cardiomegaly  -Echo 5/5/21: EF 15-20% with global hypokinesis, dilated LV, biatrial enlargement, mod AR, mild MR, mod-severe TR, PH (PASP 63mmHg), trivial pericardial effusion, left pleural effusion  -Continue Lasix 40mg IV BID, Entresto 49/51mg BID and Carvedilol 25mg BID  -Continue Duoneb 3mL q6  -Repeat CXR  -Core measures, daily weight, strict I&Os with 1L fluid restriction and primafit. near euvolemia with scattered wheezing B/L and trace LE pitting edema B/L; SpO2 99% RA and net neg 2L/24H  -BNP 26530, CXR with right basilar opacity/pleural effusion, left basilar focal atelectasis, cardiomegaly  -Echo 5/5/21: EF 15-20% with global hypokinesis, dilated LV, biatrial enlargement, mod AR, mild MR, mod-severe TR, PH (PASP 63mmHg), trivial pericardial effusion, left pleural effusion  -Continue Lasix 40mg IV BID, likely transition to PO Friday  -Continue Entresto 49/51mg BID and Carvedilol 25mg BID  -Core measures, daily weight, strict I&Os with 1L fluid restriction and primafit. volume overloaded with scattered wheezing and faint crackles B/L and trace LE pitting edema B/L; SpO2 99% RA and net neg 2L/24H  -BNP 53226, CXR with right basilar opacity/pleural effusion, left basilar focal atelectasis, cardiomegaly  -Echo 5/5/21: EF 15-20% with global hypokinesis, dilated LV, biatrial enlargement, mod AR, mild MR, mod-severe TR, PH (PASP 63mmHg), trivial pericardial effusion, left pleural effusion  -Continue Lasix 40mg IV BID, likely transition to PO Friday  -Continue Entresto 49/51mg BID and Carvedilol 25mg BID  -Core measures, daily weight, strict I&Os with 1L fluid restriction and primafit.

## 2021-05-06 NOTE — PROGRESS NOTE ADULT - PROBLEM SELECTOR PLAN 5
NSR; no documented hx of AFib in [employer] and pt on Eliquis 5mg BID at home, HCP denies h/o DVT or PE  -EKG revealing NSR and LBBB  -Will call Rufus Diaz NP at [phone] for collateral information and indication for Eliquis  -Continue Eliquis 5mg BID and Carvedilol 25mg BID NSR  -EKG revealing NSR and LBBB  -Continue Eliquis 5mg BID and Carvedilol 25mg BID NSR  -EKG revealing NSR and LBBB  -Continue Eliquis 2.5mg BID and Carvedilol 25mg BID

## 2021-05-06 NOTE — DISCHARGE NOTE PROVIDER - NSDCMRMEDTOKEN_GEN_ALL_CORE_FT
albuterol 90 mcg/inh inhalation aerosol: 2 puff(s) inhaled every 6 hours, As Needed  atorvastatin 40 mg oral tablet: 1 tab(s) orally once a day  Coreg 25 mg oral tablet: 1 tab(s) orally 2 times a day  Corlanor 5 mg oral tablet: 1 tab(s) orally 2 times a day (with meals)  Eliquis 5 mg oral tablet: 1 tab(s) orally 2 times a day  Entresto 49 mg-51 mg oral tablet: 1 tab(s) orally 2 times a day  ferrous sulfate 325 mg (65 mg elemental iron) oral delayed release tablet: 1 tab(s) orally once a day  furosemide 40 mg oral tablet: 1 tab(s) orally once a day  gabapentin 100 mg oral tablet: orally every 12 hours  Hospital bed: Hospital bed  levothyroxine 100 mcg (0.1 mg) oral tablet: 1 tab(s) orally once a day  Senna 8.6 mg oral tablet: 2 tab(s) orally once a day (at bedtime)  venlafaxine 75 mg oral tablet: 1 tab(s) orally once a day   albuterol 90 mcg/inh inhalation aerosol: 2 puff(s) inhaled every 6 hours, As Needed  atorvastatin 40 mg oral tablet: 1 tab(s) orally once a day  Coreg 25 mg oral tablet: 1 tab(s) orally 2 times a day  Corlanor 5 mg oral tablet: 1 tab(s) orally 2 times a day (with meals)  Eliquis 5 mg oral tablet: 1 tab(s) orally 2 times a day  Entresto 49 mg-51 mg oral tablet: 1 tab(s) orally 2 times a day  ferrous sulfate 325 mg (65 mg elemental iron) oral delayed release tablet: 1 tab(s) orally once a day  furosemide 40 mg oral tablet: 1 tab(s) orally once a day  gabapentin 100 mg oral tablet: orally every 12 hours  Hospital bed: Hospital bed  Hospital Bed: Indication: ICD 10 I50.22. IFTIKHAR: 99 days.  levothyroxine 100 mcg (0.1 mg) oral tablet: 1 tab(s) orally once a day  Senna 8.6 mg oral tablet: 2 tab(s) orally once a day (at bedtime)  venlafaxine 75 mg oral tablet: 1 tab(s) orally once a day   albuterol 90 mcg/inh inhalation aerosol: 2 puff(s) inhaled every 6 hours, As Needed  atorvastatin 40 mg oral tablet: 1 tab(s) orally once a day  Coreg 25 mg oral tablet: 1 tab(s) orally 2 times a day  Entresto 49 mg-51 mg oral tablet: 1 tab(s) orally 2 times a day  ferrous sulfate 325 mg (65 mg elemental iron) oral delayed release tablet: 1 tab(s) orally once a day  furosemide 40 mg oral tablet: 1 tab(s) orally once a day  gabapentin 100 mg oral tablet: orally every 12 hours  Hospital Bed: Indication: ICD 10 I50.22. IFTIKHAR: 99 days.  Senna 8.6 mg oral tablet: 2 tab(s) orally once a day (at bedtime)  venlafaxine 75 mg oral tablet: 1 tab(s) orally once a day   albuterol 90 mcg/inh inhalation aerosol: 2 puff(s) inhaled every 6 hours, As Needed  apixaban 2.5 mg oral tablet: 1 tab(s) orally every 12 hours  atorvastatin 40 mg oral tablet: 1 tab(s) orally once a day  Coreg 25 mg oral tablet: 1 tab(s) orally 2 times a day  Entresto 49 mg-51 mg oral tablet: 1 tab(s) orally 2 times a day  ferrous sulfate 325 mg (65 mg elemental iron) oral delayed release tablet: 1 tab(s) orally once a day  furosemide 40 mg oral tablet: 1 tab(s) orally once a day  gabapentin 100 mg oral tablet: orally every 12 hours  Hospital Bed: Indication: ICD 10 I50.22. IFTIKHAR: 99 days.  ivabradine 5 mg oral tablet: 1 tab(s) orally 2 times a day (with meals)  levothyroxine 112 mcg (0.112 mg) oral tablet: 1 tab(s) orally once a day  Senna 8.6 mg oral tablet: 2 tab(s) orally once a day (at bedtime)  tiotropium 18 mcg inhalation capsule: 1 cap(s) inhaled once a day  venlafaxine 75 mg oral tablet: 1 tab(s) orally once a day

## 2021-05-07 PROBLEM — N18.30 CHRONIC KIDNEY DISEASE, STAGE 3 UNSPECIFIED: Chronic | Status: ACTIVE | Noted: 2021-01-01

## 2021-05-07 PROBLEM — I50.22 CHRONIC SYSTOLIC (CONGESTIVE) HEART FAILURE: Chronic | Status: ACTIVE | Noted: 2021-01-01

## 2021-05-07 PROBLEM — F03.90 UNSPECIFIED DEMENTIA WITHOUT BEHAVIORAL DISTURBANCE: Chronic | Status: ACTIVE | Noted: 2021-01-01

## 2021-05-07 NOTE — PROGRESS NOTE ADULT - TIME BILLING
See PA note written above, for details. I reviewed the PA documentation.  I have personally seen and examined this patient today. I reviewed vitals, labs, medications, cardiac studies and additional imaging.  I agree with the PA's findings and plans as written above with the following additions/amendments:  89F DNR/DNI, bedbound with Dementia (A&O x2), HTN, HLD, Pre-DM, Chronic Systolic CHF (EF 15-20%), severe TR, known LBBB, CKD Stage 3 (baseline Cr 1-1.3), Anemia (baseline Hgb 9s-10s),  who presented to [facility] ED on 5/4/21 after witnessed syncopal episode and acute on chronic systolic CHF exacerbation  Plan for  Lasix 40mg po daily to maintain euvolemia  Eliquis 2.5 BID for Afib CVA risk reduction  Coreg + Entresto for CHF GDMT  s/p CTX for UTI  Anticipate home with home care with DME on 5/8,  scheduled for 10AM  Case discussed with patient in [place] and cardiac care team  Fermin Stoner M.D.  Cardiology Attending
Initial attending contact date 5/5/21     . See PA note written above for details. I reviewed the PA documentation. I have personally seen and examined this patient. I reviewed vitals, labs, medications, cardiac studies, and additional imaging. I agree with the above PA's findings and plans as written above with the following additions/statements.    88yo Azerbaijani speaking Female, DNR/DNI, bedbound, with PMHx of Dementia (A&O x2), HTN, HLD, Pre-DM, Chronic Systolic CHF (EF 15-20%), severe TR, known LBBB, CKD Stage 3 (baseline Cr 1-1.3), Anemia (baseline Hgb 9s-10s), Asthma, Hypothyroidism and Depression, who presented to [facility] ED on 5/4/21 after witnessed syncopal episode and acute on chronic systolic CHF exacerbation    -Acute on chronic systolic HF: With improved SOB with diuresis, satting 98% RA , still with bibasilar rales and small bl effusion on CXR.   -duonebs for wheeze on exam today  - -5.6 L since admission, cont lasix 40 mg IV bid (home dose - lasix 40 mg po qd )  - core heart failure measures, Strict Is and Os  -Hx P A fib: on tele, nsr. On elquis 2.5 mg po bid, cont coreg  -ECHO overall unchanged: EF 15-20, dilated LV, with mod-severe TR with PA press 63, mild MR, Mod AI  -Cont entresto, coreg   -Hypothyroidism - TSH 19, cont synthroid , fu endo recs  -IV Ceftriax for UTI, fu Cxs  -PT eval : [facility] vs home PT, family prefers home with home PT when dc ready
as noted above

## 2021-05-07 NOTE — PROGRESS NOTE ADULT - PROBLEM SELECTOR PLAN 10
A&O x 2 (person and place)  -Continue home Venlafaxine 75mg QD    F: None  E: Replete if K<4 or Mag<2  N: DASH Diet with 1L fluid restriction  VTEppx: Eliquis  Dispo: pending diuresis  PT: home PT

## 2021-05-07 NOTE — PROGRESS NOTE ADULT - ASSESSMENT
88yo [place] speaking Female, DNR/DNI (Advanced Directives in [employer]), with PMHx of Dementia (A&O x2), HTN, HLD, Pre-DM, Chronic Systolic CHF (EF 15-20%), mod-severe TR, known LBBB, CKD Stage 3 (baseline Cr 1-1.3), Anemia (baseline Hgb 9s-10s), Asthma, Hypothyroidism and Depression, who presented to [facility] ED on 5/4/21 after witnessed syncopal episode and found to be in acute on chronic CHF exacerbation and a UTI, currently IV diuresing well and on IV abx. Plan for discharge home on 5/8/21 at 11am.

## 2021-05-07 NOTE — PROGRESS NOTE ADULT - SUBJECTIVE AND OBJECTIVE BOX
Interventional Cardiology PA Adult Progress Note    CC:   Subjective Assessment:        ROS otherwise negative except as stated in HPI and subjective  MEDICATIONS:  carvedilol 25 milliGRAM(s) Oral every 12 hours  furosemide   Injectable 40 milliGRAM(s) IV Push two times a day  sacubitril 49 mG/valsartan 51 mG 1 Tablet(s) Oral two times a day    cefTRIAXone   IVPB      cefTRIAXone   IVPB 1000 milliGRAM(s) IV Intermittent every 24 hours    ALBUTerol    90 MICROgram(s) HFA Inhaler 2 Puff(s) Inhalation every 6 hours PRN  albuterol/ipratropium for Nebulization 3 milliLiter(s) Nebulizer every 6 hours  tiotropium 18 MICROgram(s) Capsule 1 Capsule(s) Inhalation daily    gabapentin 100 milliGRAM(s) Oral two times a day  venlafaxine XR. 75 milliGRAM(s) Oral every 24 hours    senna 2 Tablet(s) Oral at bedtime    atorvastatin 40 milliGRAM(s) Oral at bedtime  levothyroxine 112 MICROGram(s) Oral daily    apixaban 2.5 milliGRAM(s) Oral every 12 hours  ferrous    sulfate 325 milliGRAM(s) Oral daily      	    [PHYSICAL EXAM:  TELEMETRY:  T(C): 36.4 (05-07-21 @ 14:36), Max: 36.8 (05-06-21 @ 22:39)  HR: 65 (05-07-21 @ 15:40) (63 - 70)  BP: 105/51 (05-07-21 @ 15:40) (105/51 - 145/62)  RR: 19 (05-07-21 @ 15:40) (16 - 22)  SpO2: 100% (05-07-21 @ 15:40) (95% - 100%)  Wt(kg): --  I&O's Summary    06 May 2021 07:01  -  07 May 2021 07:00  --------------------------------------------------------  IN: 1008 mL / OUT: 3750 mL / NET: -2742 mL    07 May 2021 07:01  -  07 May 2021 17:16  --------------------------------------------------------  IN: 250 mL / OUT: 1100 mL / NET: -850 mL        Rae:  Central/PICC/Mid Line:                                         Appearance: Normal	  HEENT:   Normal oral mucosa, PERRL, EOMI	  Neck: Supple, + JVD/ - JVD; Carotid Bruit   Cardiovascular: Normal S1 S2, No JVD, No murmurs,   Respiratory: Lungs clear to auscultation/Decreased Breath Sounds/No Rales, Rhonchi, Wheezing	  Gastrointestinal:  Soft, Non-tender, + BS	  Skin: No rashes, No ecchymoses, No cyanosis  Extremities: Normal range of motion, No clubbing, cyanosis or edema  Vascular: Peripheral pulses palpable 2+ bilaterally  Neurologic: Non-focal  Psychiatry: A & O x 3, Mood & affect appropriate  	    LABS:	 	                              9.7    4.69  )-----------( 193      ( 07 May 2021 07:56 )             31.8     05-07    141  |  100  |  31<H>  ----------------------------<  91  4.1   |  32<H>  |  1.29    Ca    8.8      07 May 2021 07:56  Mg     2.1     05-07      proBNP:   Lipid Profile:   HgA1c:   TSH:       ASSESSMENT/PLAN: 	        DVT ppx:  Dispo:

## 2021-05-07 NOTE — PROGRESS NOTE ADULT - PROBLEM SELECTOR PROBLEM 1
Acute on chronic systolic CHF (congestive heart failure)

## 2021-05-07 NOTE — PROGRESS NOTE ADULT - PROBLEM SELECTOR PLAN 4
Hx of severe TR on outpt Echo, repeat Echo revealing mod-severe TR  -Prior outpt Echo 1/30/21 revealing mod MR, severe TR (RVSP 56mmHg), mild IL, mod-severe AR  -Repeat Echo 5/5/21 revealed mod-severe TR, mod AR, mild MR  -Continue to manage volume status as above

## 2021-05-07 NOTE — PROGRESS NOTE ADULT - PROBLEM SELECTOR PLAN 6
hx of non oliguric CKD III, baseline Cr 1-1.3  -Cr 1.21 on arrival, today 1.16  -Renally dose medications, avoid nephrotoxic agents and monitor UOP and daily BMP hx of non oliguric CKD III, baseline Cr 1-1.3  -Cr 1.21 on arrival, today 1.29  -Renally dose medications, avoid nephrotoxic agents and monitor UOP and daily BMP

## 2021-05-07 NOTE — PROGRESS NOTE ADULT - PROBLEM SELECTOR PLAN 8
Hx of LAVERNE, baseline Hgb 9-10, currently stable at 9.4 today  -Pt denies any melena/hematochezia or hemoptysis  -Iron Panel WNL, continue home Ferrous Sulfate 325mg QD  -Maintain active Type&Screen

## 2021-05-07 NOTE — PROGRESS NOTE ADULT - PROBLEM SELECTOR PLAN 1
volume overloaded with scattered wheezing and faint crackles B/L and trace LE pitting edema B/L; SpO2 99% RA and net neg 2L/24H  -BNP 53566, CXR with right basilar opacity/pleural effusion, left basilar focal atelectasis, cardiomegaly  -Echo 5/5/21: EF 15-20% with global hypokinesis, dilated LV, biatrial enlargement, mod AR, mild MR, mod-severe TR, PH (PASP 63mmHg), trivial pericardial effusion, left pleural effusion  -Continue Lasix 40mg IV BID, will transition to PO on 5/8  -Continue Entresto 49/51mg BID and Carvedilol 25mg BID  -Core measures, daily weight, strict I&Os with 1L fluid restriction and primafit.

## 2021-05-07 NOTE — PROGRESS NOTE ADULT - PROBLEM SELECTOR PLAN 3
TSH 19.540, Free Thyroxine WNL, T3 Total 49  -Dr. Jenkins consulted and increase home Levothyroxine to 112mcg QD. Endocrinology signed off

## 2021-05-07 NOTE — PROGRESS NOTE ADULT - ATTENDING COMMENTS
See PA note written above, for details. I reviewed the PA documentation.  I have personally seen and examined this patient today. I reviewed vitals, labs, medications, cardiac studies and additional imaging.  I agree with the PA's findings and plans as written above with the following additions/amendments:  89F DNR/DNI, bedbound with Dementia (A&O x2), HTN, HLD, Pre-DM, Chronic Systolic CHF (EF 15-20%), severe TR, known LBBB, CKD Stage 3 (baseline Cr 1-1.3), Anemia (baseline Hgb 9s-10s),  who presented to [facility] ED on 5/4/21 after witnessed syncopal episode and acute on chronic systolic CHF exacerbation  Plan for  Lasix 40mg po daily to maintain euvolemia  Eliquis 2.5 BID for Afib CVA risk reduction  Coreg + Entresto for CHF GDMT  s/p CTX for UTI  Anticipate home with home care with DME on 5/8,  scheduled for 10AM  Case discussed with patient in [place] and cardiac care team  Fermin Stoner M.D.  Cardiology Attending
Initial attending contact date 5/5/21     . See PA note written above for details. I reviewed the PA documentation. I have personally seen and examined this patient. I reviewed vitals, labs, medications, cardiac studies, and additional imaging. I agree with the above PA's findings and plans as written above with the following additions/statements.    88yo [place] speaking Female, DNR/DNI, bedbound, with PMHx of Dementia (A&O x2), HTN, HLD, Pre-DM, Chronic Systolic CHF (EF 15-20%), severe TR, known LBBB, CKD Stage 3 (baseline Cr 1-1.3), Anemia (baseline Hgb 9s-10s), Asthma, Hypothyroidism and Depression, who presented to [facility] ED on 5/4/21 after witnessed syncopal episode and acute on chronic systolic CHF exacerbation    -Acute on chronic systolic HF: With improved SOB with diuresis, satting 98% RA , still with bibasilar rales and small bl effusion on CXR.   -duonebs for wheeze on exam today  - -5.6 L since admission, cont lasix 40 mg IV bid (home dose - lasix 40 mg po qd )  - core heart failure measures, Strict Is and Os  -Hx P A fib: on tele, nsr. On elquis 2.5 mg po bid, cont coreg  -ECHO overall unchanged: EF 15-20, dilated LV, with mod-severe TR with PA press 63, mild MR, Mod AI  -Cont entresto, coreg   -Hypothyroidism - TSH 19, cont synthroid , fu endo recs  -IV Ceftriax for UTI, fu Cxs  -PT eval : [facility] vs home PT, family prefers home with home PT when dc ready
Initial attending contact date 5/5/21     . See PA note written above for details. I reviewed the PA documentation. I have personally seen and examined this patient. I reviewed vitals, labs, medications, cardiac studies, and additional imaging. I agree with the above PA's findings and plans as written above with the following additions/statements.      89yo Slovenian speaking Female, DNR/DNI, bedbound, with PMHx of Dementia (A&O x2), HTN, HLD, Pre-DM, Chronic Systolic CHF (EF 15-20%), severe TR, known LBBB, CKD Stage 3 (baseline Cr 1-1.3), Anemia (baseline Hgb 9s-10s), Asthma, Hypothyroidism and Depression, who presented to [facility] ED on 5/4/21 after witnessed syncopal episode and acute on chronic systolic CHF exacerbation    -Acute on chronic systolic HF: With improved SOB with diuresis, satting 98% RA , still with bibasilar rales and small bl effusion on CXR  - -3 L since admission, cont lasix 40 mg IV bid (home dose - lasix 40 mg po qd )  - core heart failure measures, Strict Is and Os  -No events on tele  -ECHO overall unchanged: EF 15-20, dilated LV, with mod-severe TR with PA press 63, mild MR, Mod AI  -Cont entresto, coreg   -Hypothyroidism - TSH 19, cont synthroid , Endo to consult  -IV Ceftriax for UTI, fu Cxs  -PT to eval

## 2021-05-07 NOTE — PROGRESS NOTE ADULT - SUBJECTIVE AND OBJECTIVE BOX
Interventional Cardiology PA Adult Progress Note    CC: s/p syncope  Subjective Assessment:    Currently asymptomatic    ROS otherwise negative except as stated in HPI and subjective.   MEDICATIONS:  carvedilol 25 milliGRAM(s) Oral every 12 hours  furosemide   Injectable 40 milliGRAM(s) IV Push two times a day  sacubitril 49 mG/valsartan 51 mG 1 Tablet(s) Oral two times a day    cefTRIAXone   IVPB      cefTRIAXone   IVPB 1000 milliGRAM(s) IV Intermittent every 24 hours    ALBUTerol    90 MICROgram(s) HFA Inhaler 2 Puff(s) Inhalation every 6 hours PRN  albuterol/ipratropium for Nebulization 3 milliLiter(s) Nebulizer every 6 hours  tiotropium 18 MICROgram(s) Capsule 1 Capsule(s) Inhalation daily    gabapentin 100 milliGRAM(s) Oral two times a day  venlafaxine XR. 75 milliGRAM(s) Oral every 24 hours    senna 2 Tablet(s) Oral at bedtime    atorvastatin 40 milliGRAM(s) Oral at bedtime  levothyroxine 112 MICROGram(s) Oral daily    apixaban 2.5 milliGRAM(s) Oral every 12 hours  ferrous    sulfate 325 milliGRAM(s) Oral daily      	    [PHYSICAL EXAM:  TELEMETRY: intermittent couplets  T(C): 36.4 (05-07-21 @ 14:36), Max: 36.8 (05-06-21 @ 22:39)  HR: 65 (05-07-21 @ 15:40) (63 - 70)  BP: 105/51 (05-07-21 @ 15:40) (105/51 - 145/62)  RR: 19 (05-07-21 @ 15:40) (16 - 22)  SpO2: 100% (05-07-21 @ 15:40) (95% - 100%)  Wt(kg): --  I&O's Summary    06 May 2021 07:01  -  07 May 2021 07:00  --------------------------------------------------------  IN: 1008 mL / OUT: 3750 mL / NET: -2742 mL    07 May 2021 07:01  -  07 May 2021 17:21  --------------------------------------------------------  IN: 250 mL / OUT: 1100 mL / NET: -850 mL      Rae:   Central/PICC/Mid Line: none                                        Appearance: Normal	  HEENT:   Normal oral mucosa, PERRL, EOMI	  Neck: Supple  Cardiovascular: Normal S1 S2, No JVD, No murmurs,   Respiratory: Lungs clear to auscultation b/l, No Rales, Rhonchi, Wheezing	  Gastrointestinal:  Soft, Non-tender, + BS	  Skin: No rashes, No ecchymoses, No cyanosis  Extremities: Normal range of motion, No clubbing, cyanosis or edema  Vascular: Peripheral pulses palpable 2+ bilaterally  Neurologic: Non-focal  Psychiatry: A & O x 3, Mood & affect appropriate          LABS:	 	                            9.7    4.69  )-----------( 193      ( 07 May 2021 07:56 )             31.8     05-07    141  |  100  |  31<H>  ----------------------------<  91  4.1   |  32<H>  |  1.29    Ca    8.8      07 May 2021 07:56  Mg     2.1     05-07      ASSESSMENT/PLAN:

## 2021-05-07 NOTE — PROGRESS NOTE ADULT - PROBLEM SELECTOR PLAN 2
Presented after syncopal episode, likely due to UTI. Pt currently asymptomatic, afebrile and no leukocytosis  -UA+ for small leuks, >10WBC, few yeast; UCx showed >100k CFU of Aerococcus urinae.   -Continue Ceftriaxone 1g IV QD (through 5/8)

## 2021-05-08 NOTE — DISCHARGE NOTE NURSING/CASE MANAGEMENT/SOCIAL WORK - PATIENT PORTAL LINK FT
You can access the FollowMyHealth Patient Portal offered by [facility] by registering at the following website: [email or web address]. By joining [facility]’s FollowMyHealth portal, you will also be able to view your health information using other applications (apps) compatible with our system.

## 2021-05-08 NOTE — DISCHARGE NOTE NURSING/CASE MANAGEMENT/SOCIAL WORK - NSDCPEELIQUISFU_GEN_ALL_CORE
Go for blood tests as directed. Your doctor will do lab tests at regular visits to monitor the effects of this medicine. Please follow up with your doctor and keep your health care provider appointments. Complex Repair And Dorsal Nasal Flap Text: The defect edges were debeveled with a #15 scalpel blade.  The primary defect was closed partially with a complex linear closure.  Given the location of the remaining defect, shape of the defect and the proximity to free margins a dorsal nasal flap was deemed most appropriate for complete closure of the defect.  Using a sterile surgical marker, an appropriate flap was drawn incorporating the defect and placing the expected incisions within the relaxed skin tension lines where possible.    The area thus outlined was incised deep to adipose tissue with a #15 scalpel blade.  The skin margins were undermined to an appropriate distance in all directions utilizing iris scissors.

## 2021-05-11 PROBLEM — F32.9 DEPRESSION: Status: ACTIVE | Noted: 2020-03-05

## 2021-05-11 PROBLEM — I24.8: Status: ACTIVE | Noted: 2020-03-04

## 2021-05-11 PROBLEM — E03.9 HYPOTHYROIDISM, ADULT: Status: ACTIVE | Noted: 2021-01-01

## 2021-05-11 PROBLEM — I44.7 LBBB (LEFT BUNDLE BRANCH BLOCK): Status: ACTIVE | Noted: 2020-03-04

## 2021-05-11 PROBLEM — I50.22 CHRONIC SYSTOLIC HEART FAILURE: Status: ACTIVE | Noted: 2021-01-01

## 2021-05-11 PROBLEM — I13.0: Status: ACTIVE | Noted: 2020-03-04

## 2021-05-11 PROBLEM — J15.20: Status: ACTIVE | Noted: 2020-03-04

## 2021-05-11 PROBLEM — J45.909 ASTHMA, UNSPECIFIED ASTHMA SEVERITY, UNSPECIFIED WHETHER COMPLICATED, UNSPECIFIED WHETHER PERSISTENT: Status: ACTIVE | Noted: 2020-01-01

## 2021-05-11 PROBLEM — N18.9: Status: ACTIVE | Noted: 2020-03-04

## 2021-05-11 PROBLEM — I48.0 PAROXYSMAL ATRIAL FIBRILLATION: Status: ACTIVE | Noted: 2021-01-01

## 2021-05-11 PROBLEM — E46 PROTEIN-CALORIE MALNUTRITION: Status: ACTIVE | Noted: 2020-03-04

## 2021-05-11 PROBLEM — M17.5 OTHER SECONDARY OSTEOARTHRITIS OF LEFT KNEE: Status: ACTIVE | Noted: 2020-03-04

## 2021-05-11 PROBLEM — E78.5 HYPERLIPIDEMIA, UNSPECIFIED HYPERLIPIDEMIA TYPE: Status: ACTIVE | Noted: 2020-03-04

## 2021-05-11 NOTE — COUNSELING
[Normal Weight - ( BMI  <25 )] : normal weight - ( BMI  <25 ) [Non - Smoker] : non-smoker [Hypertension self management education material provided] : hypertension self management education material provided [Use assistive device to avoid falls] : use assistive device to avoid falls [] : foot exam [Patient not on disease-modifying anti-rheumatic drug due to overall prognosis] : Patient not on disease-modifying anti-rheumatic drug due to overall prognosis [Decrease stress] : decrease stress [Decrease hospital use] : decrease hospital use [Discussed disease trajectory with patient/caregiver] : discussed disease trajectory with patient/caregiver [Patient/Caregiver not ready to engage in discussion] : patient/caregiver not ready to engage in discussion [Full Code] : Code Status: Full Code [No Limitations] : Treatment Guidelines: No limitations [Long Term Intubation] : Intubation: Long term intubation [[facility] Date: _____] [facility]/last verification date: [unfilled] [FreeTextEntry4] : Educated patient/legal representative about CCM services and consent.\par Educated patient/legal representative that this service is available because they have 2 or more chronic conditions, that only one Provider can furnish CCM Services per calendar month and that CCM services are subject to the usual Medicare deductible and coinsurance. \par Patient/legal representative understands the right to stop the CCM services at any time by notifying [facility] office.\par Patient/legal representative has verbally consented 10/2020\par \par

## 2021-05-11 NOTE — PHYSICAL EXAM
[No Acute Distress] : no acute distress [Well Nourished] : well nourished [Well Developed] : well developed [Normal Sclera/Conjunctiva] : normal sclera/conjunctiva [EOMI] : extra ocular movement intact [Normal Outer Ear/Nose] : the ears and nose were normal in appearance [Normal Oropharynx] : the oropharynx was normal [No JVD] : no jugular venous distention [No Respiratory Distress] : no respiratory distress [Clear to Auscultation] : lungs were clear to auscultation bilaterally [No Accessory Muscle Use] : no accessory muscle use [Normal Rate] : heart rate was normal  [Regular Rhythm] : with a regular rhythm [Normal S1, S2] : normal S1 and S2 [No Murmurs] : no murmurs heard [No Edema] : there was no peripheral edema [Normal Bowel Sounds] : normal bowel sounds [Non Tender] : non-tender [Soft] : abdomen soft [Not Distended] : not distended [Normal Post Cervical Nodes] : no posterior cervical lymphadenopathy [Normal Anterior Cervical Nodes] : no anterior cervical lymphadenopathy [No CVA Tenderness] : no ~M costovertebral angle tenderness [No Spinal Tenderness] : no spinal tenderness [Normal Strength/Tone] : muscle strength and tone were normal [No Rash] : no rash [Oriented x3] : oriented to person, place, and time [Normal Affect] : the affect was normal [de-identified] : [place] speaking.  Verbalizing needs.  No evidence of labored breathing or distress.  [de-identified] : Soft, palpable mass in the epigastric area note.  [de-identified] : Refusing to ambulate.  Denies pain.  Just does not want to walk.

## 2021-05-11 NOTE — HISTORY OF PRESENT ILLNESS
[Family Member] : family member [Formal Caregiver] : formal caregiver [FreeTextEntry1] : HTN, HLD, HFrEF (EF 25% from 02/2019), LBBB, pre-diabetes, RLS  [FreeTextEntry2] : EMAON WISE is an 89 year with HTN, HLD, HFrEF (EF 25% from 02/2019), LBBB, pre-diabetes, RLS        \par Follow up on chronic medical issuest with HCP [facility].  Reports patient is refusing to ambulate.  Denied pain.  No skin breakdown.  No fever or chills.  Requires HHA service.  Severe arthritis in the knee limited mobility.  Soft palpable mass in the epigastric area noted. No pain.  DISCHARGED 5/8/21\par \par Hospital course significant for UTI and pt s/p Ceftriaxone 1g IV QD x 3 days. Pt also found to have elevated\par TSH to 19.540, Endo consulted and home Levothyroxine increased to 112mcg QD. Pt\par euvolemic on 5/8/21 and was transitioned to Furosemide 40mg daily. Pt's Eliquis\par was decreased to 2.5mg BID on admission.\par Patient denies fever, cough, trouble breathing, rash, vomiting and diarrhea. Patient has not been in close contact with someone covid positive.\par \par N95 mask, gloves, eye wear and gown used during visit: [Y]. Total face to face time with patient is 40 min.\par \par Working with family to establish immunization history\par \par \par Patient/ patient's caregiver reports no weight loss >10lbs in the past 6 months. No changes in dentition or swallowing reported, No changes in hearing or vision reported. No changes in Cognition reported. Patient denies any symptoms of depression or anxiety. Patient is ADL and IADL dependent. No changes in gait or falls reported. \par Patient's home environment is safe. \par Goals of care discussed. MOLST completed. \par \par \par

## 2021-07-06 ENCOUNTER — APPOINTMENT (OUTPATIENT)
Dept: HOME HEALTH SERVICES | Facility: HOME HEALTH | Age: 86
End: 2021-07-06

## 2021-07-13 ENCOUNTER — NON-APPOINTMENT (OUTPATIENT)
Age: 86
End: 2021-07-13

## 2021-07-15 ENCOUNTER — APPOINTMENT (OUTPATIENT)
Dept: HEART AND VASCULAR | Facility: CLINIC | Age: 86
End: 2021-07-15

## 2021-10-11 ENCOUNTER — FORM ENCOUNTER (OUTPATIENT)
Age: 86
End: 2021-10-11

## 2022-03-05 NOTE — H&P ADULT - NSICDXPASTMEDICALHX_GEN_ALL_CORE_FT
10 PAST MEDICAL HISTORY:  Chronic systolic congestive heart failure     Dementia, senile with depression     HLD (hyperlipidemia)     Hypertension     Stage 3 chronic kidney disease

## 2022-05-30 NOTE — PATIENT PROFILE ADULT - FALL HARM RISK
1 person assist
age(85 years old or older)/coagulation(Bleeding disorder R/T clinical cond/anti-coags)

## 2022-08-18 NOTE — DIETITIAN INITIAL EVALUATION ADULT. - SIGNS/SYMPTOMS
Clara is seen today for a hearing evaluation in consultation for GETACHEW Connolly of the ENT clinic.  Clara reports that she feels her hearing is fluctuating.    Pure tone testing demonstrated a mild sloping to moderately severe hearing loss, in both ears. This is slightly poorer than her test results from 2019.      Speech reception thresholds were consistent with pure tone testing.     Word recognition scores were 88% in the right ear and 48% in the left. The left ear score in 2019 was 84% which is a significant difference from today's score.    The results of today's testing were reviewed with Clara and forwarded to GETACHEW Connolly of the ENT clinic for review.  
AEB CHF, pressure ulcer

## 2023-05-04 NOTE — H&P ADULT - NS MD HP PULSE FEMORAL
2+ b/l no bruits Cimetidine Counseling:  I discussed with the patient the risks of Cimetidine including but not limited to gynecomastia, headache, diarrhea, nausea, drowsiness, arrhythmias, pancreatitis, skin rashes, psychosis, bone marrow suppression and kidney toxicity.

## 2023-09-30 NOTE — PROGRESS NOTE ADULT - PROBLEM SELECTOR PLAN 1
volume overloaded with bibasilar crackles and 2+ pitting edema; SpO2 99% on RA and net neg 2L/24H  -BNP 75555, CXR with right basilar opacity/pleural effusion, left basilar focal atelectasis, cardiomegaly  -Echo 5/5/21: EF 15/20% with global hypokinesis, dilated LV, biatrial enlargement, mod AR, mild MR, mod-severe TR, PH (PASP 63mmHg), trivial pericardial effusion, left pleural effusion. Compared to prior Echo 1/2020 TR worse  -Continue Lasix 40mg IV BID, Entresto 49/51mg BID and Carvedilol 25mg BID  -Core measures, daily weight, strict I&Os with 1L fluid restriction and primafit. Yes volume overloaded with bibasilar crackles and 2+ pitting edema; SpO2 99% on RA and net neg 2L/24H  -BNP 08097, CXR with right basilar opacity/pleural effusion, left basilar focal atelectasis, cardiomegaly  -Echo 5/5/21: EF 15-20% with global hypokinesis, dilated LV, biatrial enlargement, mod AR, mild MR, mod-severe TR, PH (PASP 63mmHg), trivial pericardial effusion, left pleural effusion. Compared to prior Echo 1/2020 TR worse  -Continue Lasix 40mg IV BID, Entresto 49/51mg BID and Carvedilol 25mg BID  -Core measures, daily weight, strict I&Os with 1L fluid restriction and primafit. volume overloaded with bibasilar crackles and 2+ pitting edema; SpO2 99% on RA and net neg 2L/24H  -BNP 37435, CXR with right basilar opacity/pleural effusion, left basilar focal atelectasis, cardiomegaly  -Echo 5/5/21: EF 15-20% with global hypokinesis, dilated LV, biatrial enlargement, mod AR, mild MR, mod-severe TR, PH (PASP 63mmHg), trivial pericardial effusion, left pleural effusion  -Continue Lasix 40mg IV BID, Entresto 49/51mg BID and Carvedilol 25mg BID  -Core measures, daily weight, strict I&Os with 1L fluid restriction and primafit.

## 2024-01-27 NOTE — ED POST DISCHARGE NOTE - DETAILS
discussed result with pt who endorses some urinary frequency, will treat with course of keflex, f/u pmd. ( #[id number])
Walker

## 2024-02-07 NOTE — ED ADULT TRIAGE NOTE - INTERNATIONAL TRAVEL
"Pt discharged to home with steady gait.  Pt alert and oriented times 4 on room air.  IV discontinued and gauze placed, pt in possession of belongings.  Pt provided discharge education and information pertaining to medications and follow up appointments.  Pt received copy of discharge instructions and verbalized understanding. Encouraged to follow up with PCP. Pt provided work note. /65   Pulse 66   Temp 36.9 °C (98.5 °F) (Temporal)   Resp 16   Ht 1.575 m (5' 2\")   Wt 87.3 kg (192 lb 7.4 oz)   LMP  (LMP Unknown) Comment: last week of October  SpO2 97%   BMI 35.20 kg/m²      " Unable to Assess

## 2024-03-14 NOTE — PROVIDER CONTACT NOTE (CRITICAL VALUE NOTIFICATION) - NS PROVIDER READ BACK TO LAB
Head,  normocephalic,  atraumatic,  Face,  Face within normal limits,  Ears,  External ears within normal limits,  Nose/Nasopharynx,  External nose  normal appearance,  nares patent, yes

## 2024-08-27 NOTE — PROGRESS NOTE ADULT - ASSESSMENT
per Internal Medicine    88F PMH HTN, HLD, HFrEF (EF 25% from 02/2019), LBBB, pre-diabetes, RLS presents with shortness of breath for one day admitted for severe sepsis secondary to influenza, infiltrate on CXR, and MRSA bacteremia. Currently stable on HFNC.     Problem/Plan - 1:  ·  Problem: Severe sepsis.  Plan: Presented with 3/4 SIRS criteria, febrile 102F (rectal), tachypneic to 22, leukocytosis Wbc 21.03 on admission. Creatinine elevated from baseline of 0.9 with no elevated lacted. Fluids were held because patient was thought to be fluid-overloaded (crackles in the lungs and shortness of breath requiring BIPAP). S/p Solumedrol 120mg IV x1, Zosyn 3.375g IV x1, Vancomycin 1g IV x1. Duoneb x3 in the ED. Trops elevated likely in the setting of demand ischemia now peaked. CXR clear on admission, UA neg, influenza AH3 positive. Found to have new RLL infiltrate on CXR on 01/14. Procalcitonin 83 with new WBC 13.84.   - started on tamiflu 30mg BID (started on 01/13 with last day on 01/17)  - f/u urine cultures, currently growing E coli   - MRSA swab positive   - blood cultures positive for MRSA  - f/u on surveillance cultures, currently NGTD  - holding antihypertensives for now in the setting of sepsis, can restart appropriately when needed  - Stopped ceftriaxone 1 g IV daily   - stopped vancomycin;   - started ceftaroline 400 mg q 12 hrs (start date 01/16)     #MRSA bacteremia. See above  - follow-up on TTE   - cardiology following, appreciate recs.     Problem/Plan - 2:  ·  Problem: MRSA bacteremia.  Plan: - ID following   - see above.     Problem/Plan - 3:  ·  Problem: Acute respiratory failure with hypoxia.  Plan: Patient has increased work of breathing intermittently when placed on NC. Now on BiPAP breathing comfortably. Resolving, patient initially required BiPapp, weaned to HFNC. Likely in setting of MRSA PNA and Flu.     Problem/Plan - 4:  ·  Problem: R/O CHF exacerbation.  Plan: Previous echo on 02/2019 with EF of 25%. Presented with one day history of worsening shortness of breath, crackles on exam, BNP 40,975, which may possibly be CHF exacerbation secondary to the flu. Held the fluids given that she was volume overloaded requiring BIPAP. ABG pH 7.35 PO2 285. Was given IV lasix 20 mg in ED. Now off BiPAP on HFNC breathing comfortably.   - f/u TTE, possibly for 01/20  - in setting of worsening creatinine, will hold diuretics   - restarted home digoxin 0.125mg every other day  - On ivabradine 5mg twice a day with meals at home (cardiologist Dr. Jamaal Shaw [phone])  - holding home dose of coreg from 25mg BID   - holding home isosorbide mononitrate 30mg daily  - holding enalapril 10mg BID  - daily weights   - strict I+Os.     Problem/Plan - 5:  ·  Problem: NICOLÁS (acute kidney injury).  Plan: presenting with cr 1.62 (baseline 0.9) etiology likely prerenal in the setting of severe sepsis due to flu. This AM creatinine worsened to 2.97. FENa 0.4 percent.   - avoid nephrotoxic agents  - hold ACE/ARB  - continue to trend CMP daily    #R/o ATN. Presented with NICOLÁS, but UOP   - continue to closely monitor UOP.     Problem/Plan - 6:  Problem: Elevated troponin. Plan: presenting with an elevated troponin of 0.07 in the setting of demand ischemia. EKG with no ST changes, LBBB unchanged from prior EKG. Trop peaked at 0.06. Had CP and SOB on 01/15 with elevated trop to 0.09 that peaked.   - No need to continue to trend     #?cardiac arrythmia  pt on digoxin 0.125mg every other day and ivabradine 5mg at home with meals. Unsure why. Will continue. Called cardiologist Dr. Jamaal Shaw for more information [phone]  -obtain more collateral.    Problem/Plan - 7:  ·  Problem: Transaminitis.  Plan: presenting with alkphos 155 and  likely in the setting of sepsis v hepatic congestion. Today, LFTs uptrended to AST//191. Most likely due to sepsis.   - daily LFTs.     Problem/Plan - 8:  ·  Problem: Hypertension.  Plan: history of hypertension, currently normotensive  - holding home carvedilol 25mg BID at home  - holding home isosorbide mononitrate 30mg daily   - holding home Enalapril 10mg BID  - patient currently normotensive but can restart antihypertensives appropriately as needed.     Problem/Plan - 9:  ·  Problem: Prophylactic measure.  Plan: F: none  E:  Replete K<4, Mg< 2  N: DASH diet. sequencing, safety, initiation/verbal cues/1 person assist